# Patient Record
Sex: MALE | Race: WHITE | NOT HISPANIC OR LATINO | Employment: FULL TIME | URBAN - METROPOLITAN AREA
[De-identification: names, ages, dates, MRNs, and addresses within clinical notes are randomized per-mention and may not be internally consistent; named-entity substitution may affect disease eponyms.]

---

## 2017-06-30 ENCOUNTER — APPOINTMENT (OUTPATIENT)
Dept: GENERAL RADIOLOGY | Facility: HOSPITAL | Age: 72
End: 2017-06-30

## 2017-06-30 ENCOUNTER — HOSPITAL ENCOUNTER (INPATIENT)
Facility: HOSPITAL | Age: 72
LOS: 14 days | Discharge: HOME HEALTH | End: 2017-07-14
Attending: EMERGENCY MEDICINE | Admitting: INTERNAL MEDICINE

## 2017-06-30 DIAGNOSIS — Z74.09 IMPAIRED MOBILITY: ICD-10-CM

## 2017-06-30 DIAGNOSIS — S72.001A CLOSED RIGHT HIP FRACTURE, INITIAL ENCOUNTER (HCC): ICD-10-CM
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
## 2017-06-30 PROBLEM — I10 HTN (HYPERTENSION): Status: ACTIVE | Noted: 2017-06-30

## 2017-06-30 PROBLEM — I49.3 PVC (PREMATURE VENTRICULAR CONTRACTION): Status: ACTIVE | Noted: 2017-06-30

## 2017-06-30 LAB
ABO GROUP BLD: NORMAL
ALBUMIN SERPL-MCNC: 4.4 G/DL (ref 3.5–5.2)
ALBUMIN/GLOB SERPL: 1.4 G/DL
ALP SERPL-CCNC: 91 U/L (ref 39–117)
ALT SERPL W P-5'-P-CCNC: 26 U/L (ref 1–41)
ANION GAP SERPL CALCULATED.3IONS-SCNC: 10.3 MMOL/L
AST SERPL-CCNC: 17 U/L (ref 1–40)
BASOPHILS # BLD AUTO: 0.08 10*3/MM3 (ref 0–0.2)
BASOPHILS NFR BLD AUTO: 0.7 % (ref 0–1.5)
BILIRUB SERPL-MCNC: 0.6 MG/DL (ref 0.1–1.2)
BLD GP AB SCN SERPL QL: NEGATIVE
BUN BLD-MCNC: 12 MG/DL (ref 8–23)
BUN/CREAT SERPL: 11.5 (ref 7–25)
CALCIUM SPEC-SCNC: 9.3 MG/DL (ref 8.6–10.5)
CHLORIDE SERPL-SCNC: 101 MMOL/L (ref 98–107)
CO2 SERPL-SCNC: 27.7 MMOL/L (ref 22–29)
CREAT BLD-MCNC: 1.04 MG/DL (ref 0.76–1.27)
DEPRECATED RDW RBC AUTO: 45 FL (ref 37–54)
EOSINOPHIL # BLD AUTO: 0.23 10*3/MM3 (ref 0–0.7)
EOSINOPHIL NFR BLD AUTO: 2 % (ref 0.3–6.2)
ERYTHROCYTE [DISTWIDTH] IN BLOOD BY AUTOMATED COUNT: 13.2 % (ref 11.5–14.5)
GFR SERPL CREATININE-BSD FRML MDRD: 71 ML/MIN/1.73
GLOBULIN UR ELPH-MCNC: 3.1 GM/DL
GLUCOSE BLD-MCNC: 109 MG/DL (ref 65–99)
HCT VFR BLD AUTO: 47.4 % (ref 40.4–52.2)
HGB BLD-MCNC: 16.1 G/DL (ref 13.7–17.6)
IMM GRANULOCYTES # BLD: 0.04 10*3/MM3 (ref 0–0.03)
IMM GRANULOCYTES NFR BLD: 0.3 % (ref 0–0.5)
LYMPHOCYTES # BLD AUTO: 2.08 10*3/MM3 (ref 0.9–4.8)
LYMPHOCYTES NFR BLD AUTO: 17.7 % (ref 19.6–45.3)
MCH RBC QN AUTO: 31.9 PG (ref 27–32.7)
MCHC RBC AUTO-ENTMCNC: 34 G/DL (ref 32.6–36.4)
MCV RBC AUTO: 94 FL (ref 79.8–96.2)
MONOCYTES # BLD AUTO: 0.93 10*3/MM3 (ref 0.2–1.2)
MONOCYTES NFR BLD AUTO: 7.9 % (ref 5–12)
NEUTROPHILS # BLD AUTO: 8.41 10*3/MM3 (ref 1.9–8.1)
NEUTROPHILS NFR BLD AUTO: 71.4 % (ref 42.7–76)
PLATELET # BLD AUTO: 150 10*3/MM3 (ref 140–500)
PMV BLD AUTO: 13.9 FL (ref 6–12)
POTASSIUM BLD-SCNC: 4.3 MMOL/L (ref 3.5–5.2)
PROT SERPL-MCNC: 7.5 G/DL (ref 6–8.5)
RBC # BLD AUTO: 5.04 10*6/MM3 (ref 4.6–6)
RH BLD: POSITIVE
SODIUM BLD-SCNC: 139 MMOL/L (ref 136–145)
WBC NRBC COR # BLD: 11.77 10*3/MM3 (ref 4.5–10.7)

## 2017-06-30 PROCEDURE — 86900 BLOOD TYPING SEROLOGIC ABO: CPT | Performed by: EMERGENCY MEDICINE

## 2017-06-30 PROCEDURE — 71010 HC CHEST PA OR AP: CPT

## 2017-06-30 PROCEDURE — 25010000002 MORPHINE PER 10 MG: Performed by: EMERGENCY MEDICINE

## 2017-06-30 PROCEDURE — 93010 ELECTROCARDIOGRAM REPORT: CPT | Performed by: INTERNAL MEDICINE

## 2017-06-30 PROCEDURE — 93005 ELECTROCARDIOGRAM TRACING: CPT | Performed by: EMERGENCY MEDICINE

## 2017-06-30 PROCEDURE — 99284 EMERGENCY DEPT VISIT MOD MDM: CPT

## 2017-06-30 PROCEDURE — 73502 X-RAY EXAM HIP UNI 2-3 VIEWS: CPT

## 2017-06-30 PROCEDURE — 86901 BLOOD TYPING SEROLOGIC RH(D): CPT | Performed by: EMERGENCY MEDICINE

## 2017-06-30 PROCEDURE — 25010000002 MORPHINE PER 10 MG: Performed by: PHYSICIAN ASSISTANT

## 2017-06-30 PROCEDURE — 25010000002 MORPHINE PER 10 MG: Performed by: ORTHOPAEDIC SURGERY

## 2017-06-30 PROCEDURE — 80053 COMPREHEN METABOLIC PANEL: CPT | Performed by: EMERGENCY MEDICINE

## 2017-06-30 PROCEDURE — 85025 COMPLETE CBC W/AUTO DIFF WBC: CPT | Performed by: EMERGENCY MEDICINE

## 2017-06-30 PROCEDURE — 86850 RBC ANTIBODY SCREEN: CPT | Performed by: EMERGENCY MEDICINE

## 2017-06-30 PROCEDURE — 25010000002 ONDANSETRON PER 1 MG: Performed by: EMERGENCY MEDICINE

## 2017-06-30 RX ORDER — AMLODIPINE BESYLATE 10 MG/1
10 TABLET ORAL DAILY
Status: DISCONTINUED | OUTPATIENT
Start: 2017-07-01 | End: 2017-07-09

## 2017-06-30 RX ORDER — ATORVASTATIN CALCIUM 10 MG/1
10 TABLET, FILM COATED ORAL DAILY
COMMUNITY
End: 2017-07-20 | Stop reason: HOSPADM

## 2017-06-30 RX ORDER — ATORVASTATIN CALCIUM 10 MG/1
10 TABLET, FILM COATED ORAL DAILY
Status: DISCONTINUED | OUTPATIENT
Start: 2017-07-01 | End: 2017-07-14 | Stop reason: HOSPADM

## 2017-06-30 RX ORDER — CARVEDILOL 12.5 MG/1
12.5 TABLET ORAL DAILY
Status: DISCONTINUED | OUTPATIENT
Start: 2017-07-01 | End: 2017-07-14 | Stop reason: HOSPADM

## 2017-06-30 RX ORDER — SODIUM CHLORIDE 0.9 % (FLUSH) 0.9 %
10 SYRINGE (ML) INJECTION AS NEEDED
Status: DISCONTINUED | OUTPATIENT
Start: 2017-06-30 | End: 2017-07-01

## 2017-06-30 RX ORDER — ONDANSETRON 2 MG/ML
4 INJECTION INTRAMUSCULAR; INTRAVENOUS ONCE
Status: COMPLETED | OUTPATIENT
Start: 2017-06-30 | End: 2017-06-30

## 2017-06-30 RX ORDER — AMLODIPINE BESYLATE 10 MG/1
10 TABLET ORAL DAILY
COMMUNITY
End: 2017-07-14 | Stop reason: HOSPADM

## 2017-06-30 RX ORDER — SODIUM CHLORIDE 0.9 % (FLUSH) 0.9 %
1-10 SYRINGE (ML) INJECTION AS NEEDED
Status: DISCONTINUED | OUTPATIENT
Start: 2017-06-30 | End: 2017-07-01

## 2017-06-30 RX ORDER — CARVEDILOL 12.5 MG/1
12.5 TABLET ORAL DAILY
COMMUNITY

## 2017-06-30 RX ORDER — LOSARTAN POTASSIUM 50 MG/1
100 TABLET ORAL DAILY
Status: DISCONTINUED | OUTPATIENT
Start: 2017-07-01 | End: 2017-07-09

## 2017-06-30 RX ORDER — SULINDAC 200 MG/1
200 TABLET ORAL 2 TIMES DAILY
COMMUNITY
End: 2017-07-20 | Stop reason: HOSPADM

## 2017-06-30 RX ORDER — ACETAMINOPHEN 325 MG/1
650 TABLET ORAL EVERY 4 HOURS PRN
Status: DISCONTINUED | OUTPATIENT
Start: 2017-06-30 | End: 2017-07-01

## 2017-06-30 RX ORDER — ASPIRIN 81 MG/1
81 TABLET, CHEWABLE ORAL DAILY
COMMUNITY

## 2017-06-30 RX ORDER — LOSARTAN POTASSIUM 50 MG/1
100 TABLET ORAL DAILY
COMMUNITY
End: 2017-07-14 | Stop reason: HOSPADM

## 2017-06-30 RX ADMIN — ONDANSETRON 4 MG: 2 INJECTION INTRAMUSCULAR; INTRAVENOUS at 17:41

## 2017-06-30 RX ADMIN — MORPHINE SULFATE 4 MG: 4 INJECTION, SOLUTION INTRAMUSCULAR; INTRAVENOUS at 19:28

## 2017-06-30 RX ADMIN — MORPHINE SULFATE 4 MG: 4 INJECTION, SOLUTION INTRAMUSCULAR; INTRAVENOUS at 17:44

## 2017-06-30 RX ADMIN — MORPHINE SULFATE 4 MG: 4 INJECTION, SOLUTION INTRAMUSCULAR; INTRAVENOUS at 23:04

## 2017-07-01 ENCOUNTER — APPOINTMENT (OUTPATIENT)
Dept: GENERAL RADIOLOGY | Facility: HOSPITAL | Age: 72
End: 2017-07-01

## 2017-07-01 ENCOUNTER — ANESTHESIA EVENT (OUTPATIENT)
Dept: PERIOP | Facility: HOSPITAL | Age: 72
End: 2017-07-01

## 2017-07-01 ENCOUNTER — ANESTHESIA (OUTPATIENT)
Dept: PERIOP | Facility: HOSPITAL | Age: 72
End: 2017-07-01

## 2017-07-01 LAB
ALBUMIN SERPL-MCNC: 4 G/DL (ref 3.5–5.2)
ALBUMIN/GLOB SERPL: 1.4 G/DL
ALP SERPL-CCNC: 81 U/L (ref 39–117)
ALT SERPL W P-5'-P-CCNC: 29 U/L (ref 1–41)
ANION GAP SERPL CALCULATED.3IONS-SCNC: 15 MMOL/L
AST SERPL-CCNC: 23 U/L (ref 1–40)
BASOPHILS # BLD AUTO: 0.07 10*3/MM3 (ref 0–0.2)
BASOPHILS NFR BLD AUTO: 0.6 % (ref 0–1.5)
BILIRUB SERPL-MCNC: 1 MG/DL (ref 0.1–1.2)
BUN BLD-MCNC: 11 MG/DL (ref 8–23)
BUN/CREAT SERPL: 11.5 (ref 7–25)
CALCIUM SPEC-SCNC: 8.9 MG/DL (ref 8.6–10.5)
CHLORIDE SERPL-SCNC: 102 MMOL/L (ref 98–107)
CO2 SERPL-SCNC: 22 MMOL/L (ref 22–29)
CREAT BLD-MCNC: 0.96 MG/DL (ref 0.76–1.27)
DEPRECATED RDW RBC AUTO: 46.1 FL (ref 37–54)
EOSINOPHIL # BLD AUTO: 0.24 10*3/MM3 (ref 0–0.7)
EOSINOPHIL NFR BLD AUTO: 2.1 % (ref 0.3–6.2)
ERYTHROCYTE [DISTWIDTH] IN BLOOD BY AUTOMATED COUNT: 13.3 % (ref 11.5–14.5)
GFR SERPL CREATININE-BSD FRML MDRD: 77 ML/MIN/1.73
GLOBULIN UR ELPH-MCNC: 2.8 GM/DL
GLUCOSE BLD-MCNC: 106 MG/DL (ref 65–99)
GLUCOSE BLDC GLUCOMTR-MCNC: 99 MG/DL (ref 70–130)
HCT VFR BLD AUTO: 45.8 % (ref 40.4–52.2)
HGB BLD-MCNC: 15.5 G/DL (ref 13.7–17.6)
IMM GRANULOCYTES # BLD: 0.03 10*3/MM3 (ref 0–0.03)
IMM GRANULOCYTES NFR BLD: 0.3 % (ref 0–0.5)
LYMPHOCYTES # BLD AUTO: 2.1 10*3/MM3 (ref 0.9–4.8)
LYMPHOCYTES NFR BLD AUTO: 18.8 % (ref 19.6–45.3)
MAGNESIUM SERPL-MCNC: 2.3 MG/DL (ref 1.6–2.4)
MCH RBC QN AUTO: 32 PG (ref 27–32.7)
MCHC RBC AUTO-ENTMCNC: 33.8 G/DL (ref 32.6–36.4)
MCV RBC AUTO: 94.6 FL (ref 79.8–96.2)
MONOCYTES # BLD AUTO: 1.38 10*3/MM3 (ref 0.2–1.2)
MONOCYTES NFR BLD AUTO: 12.4 % (ref 5–12)
NEUTROPHILS # BLD AUTO: 7.35 10*3/MM3 (ref 1.9–8.1)
NEUTROPHILS NFR BLD AUTO: 65.8 % (ref 42.7–76)
PLATELET # BLD AUTO: 134 10*3/MM3 (ref 140–500)
PMV BLD AUTO: 13.9 FL (ref 6–12)
POTASSIUM BLD-SCNC: 4.1 MMOL/L (ref 3.5–5.2)
PROT SERPL-MCNC: 6.8 G/DL (ref 6–8.5)
RBC # BLD AUTO: 4.84 10*6/MM3 (ref 4.6–6)
SODIUM BLD-SCNC: 139 MMOL/L (ref 136–145)
TSH SERPL DL<=0.05 MIU/L-ACNC: 1.95 MIU/ML (ref 0.27–4.2)
WBC NRBC COR # BLD: 11.17 10*3/MM3 (ref 4.5–10.7)

## 2017-07-01 PROCEDURE — 25010000002 MORPHINE PER 10 MG: Performed by: ORTHOPAEDIC SURGERY

## 2017-07-01 PROCEDURE — 73502 X-RAY EXAM HIP UNI 2-3 VIEWS: CPT

## 2017-07-01 PROCEDURE — 80053 COMPREHEN METABOLIC PANEL: CPT | Performed by: INTERNAL MEDICINE

## 2017-07-01 PROCEDURE — 82962 GLUCOSE BLOOD TEST: CPT

## 2017-07-01 PROCEDURE — C1713 ANCHOR/SCREW BN/BN,TIS/BN: HCPCS | Performed by: ORTHOPAEDIC SURGERY

## 2017-07-01 PROCEDURE — 85025 COMPLETE CBC W/AUTO DIFF WBC: CPT | Performed by: INTERNAL MEDICINE

## 2017-07-01 PROCEDURE — 25010000003 CEFAZOLIN IN DEXTROSE 2-4 GM/100ML-% SOLUTION: Performed by: ORTHOPAEDIC SURGERY

## 2017-07-01 PROCEDURE — 84443 ASSAY THYROID STIM HORMONE: CPT | Performed by: INTERNAL MEDICINE

## 2017-07-01 PROCEDURE — 25010000002 PROPOFOL 10 MG/ML EMULSION: Performed by: NURSE ANESTHETIST, CERTIFIED REGISTERED

## 2017-07-01 PROCEDURE — 73501 X-RAY EXAM HIP UNI 1 VIEW: CPT

## 2017-07-01 PROCEDURE — 0QS636Z REPOSITION RIGHT UPPER FEMUR WITH INTRAMEDULLARY INTERNAL FIXATION DEVICE, PERCUTANEOUS APPROACH: ICD-10-PCS | Performed by: ORTHOPAEDIC SURGERY

## 2017-07-01 PROCEDURE — 25010000002 FENTANYL CITRATE (PF) 100 MCG/2ML SOLUTION: Performed by: NURSE ANESTHETIST, CERTIFIED REGISTERED

## 2017-07-01 PROCEDURE — 76000 FLUOROSCOPY <1 HR PHYS/QHP: CPT

## 2017-07-01 PROCEDURE — 97161 PT EVAL LOW COMPLEX 20 MIN: CPT | Performed by: PHYSICAL THERAPIST

## 2017-07-01 PROCEDURE — 83735 ASSAY OF MAGNESIUM: CPT | Performed by: INTERNAL MEDICINE

## 2017-07-01 PROCEDURE — 25010000002 SUCCINYLCHOLINE PER 20 MG: Performed by: NURSE ANESTHETIST, CERTIFIED REGISTERED

## 2017-07-01 PROCEDURE — 25010000002 ONDANSETRON PER 1 MG: Performed by: NURSE ANESTHETIST, CERTIFIED REGISTERED

## 2017-07-01 PROCEDURE — 25010000002 MIDAZOLAM PER 1 MG: Performed by: ANESTHESIOLOGY

## 2017-07-01 DEVICE — SCRW CORT FA FUL/THRD HEX3.5 TI 5X35MM: Type: IMPLANTABLE DEVICE | Site: FEMUR | Status: FUNCTIONAL

## 2017-07-01 DEVICE — ZNN CMN NAIL 11.5MMX21.5CM 125R
Type: IMPLANTABLE DEVICE | Site: FEMUR | Status: FUNCTIONAL
Brand: ZIMMER® NATURAL NAIL® SYSTEM

## 2017-07-01 DEVICE — ZNN CMN LAG SCREW 10.5X110
Type: IMPLANTABLE DEVICE | Site: FEMUR | Status: FUNCTIONAL
Brand: ZIMMER® NATURAL NAIL® SYSTEM

## 2017-07-01 RX ORDER — HYDROMORPHONE HYDROCHLORIDE 1 MG/ML
0.5 INJECTION, SOLUTION INTRAMUSCULAR; INTRAVENOUS; SUBCUTANEOUS
Status: DISCONTINUED | OUTPATIENT
Start: 2017-07-01 | End: 2017-07-01 | Stop reason: HOSPADM

## 2017-07-01 RX ORDER — PROPOFOL 10 MG/ML
VIAL (ML) INTRAVENOUS AS NEEDED
Status: DISCONTINUED | OUTPATIENT
Start: 2017-07-01 | End: 2017-07-01 | Stop reason: SURG

## 2017-07-01 RX ORDER — ONDANSETRON 4 MG/1
4 TABLET, FILM COATED ORAL EVERY 6 HOURS PRN
Status: DISCONTINUED | OUTPATIENT
Start: 2017-07-01 | End: 2017-07-14 | Stop reason: HOSPADM

## 2017-07-01 RX ORDER — ROCURONIUM BROMIDE 10 MG/ML
INJECTION, SOLUTION INTRAVENOUS AS NEEDED
Status: DISCONTINUED | OUTPATIENT
Start: 2017-07-01 | End: 2017-07-01 | Stop reason: SURG

## 2017-07-01 RX ORDER — FENTANYL CITRATE 50 UG/ML
INJECTION, SOLUTION INTRAMUSCULAR; INTRAVENOUS AS NEEDED
Status: DISCONTINUED | OUTPATIENT
Start: 2017-07-01 | End: 2017-07-01 | Stop reason: SURG

## 2017-07-01 RX ORDER — KETOROLAC TROMETHAMINE 30 MG/ML
15 INJECTION, SOLUTION INTRAMUSCULAR; INTRAVENOUS EVERY 6 HOURS PRN
Status: ACTIVE | OUTPATIENT
Start: 2017-07-01 | End: 2017-07-03

## 2017-07-01 RX ORDER — LIDOCAINE HYDROCHLORIDE 20 MG/ML
INJECTION, SOLUTION INFILTRATION; PERINEURAL AS NEEDED
Status: DISCONTINUED | OUTPATIENT
Start: 2017-07-01 | End: 2017-07-01 | Stop reason: SURG

## 2017-07-01 RX ORDER — PROMETHAZINE HYDROCHLORIDE 25 MG/ML
6.25 INJECTION, SOLUTION INTRAMUSCULAR; INTRAVENOUS ONCE AS NEEDED
Status: DISCONTINUED | OUTPATIENT
Start: 2017-07-01 | End: 2017-07-01 | Stop reason: HOSPADM

## 2017-07-01 RX ORDER — CEFAZOLIN SODIUM 2 G/100ML
2 INJECTION, SOLUTION INTRAVENOUS EVERY 8 HOURS
Status: DISCONTINUED | OUTPATIENT
Start: 2017-07-01 | End: 2017-07-01 | Stop reason: SDUPTHER

## 2017-07-01 RX ORDER — SUCCINYLCHOLINE CHLORIDE 20 MG/ML
INJECTION INTRAMUSCULAR; INTRAVENOUS AS NEEDED
Status: DISCONTINUED | OUTPATIENT
Start: 2017-07-01 | End: 2017-07-01 | Stop reason: SURG

## 2017-07-01 RX ORDER — IPRATROPIUM BROMIDE AND ALBUTEROL SULFATE 2.5; .5 MG/3ML; MG/3ML
3 SOLUTION RESPIRATORY (INHALATION) ONCE AS NEEDED
Status: DISCONTINUED | OUTPATIENT
Start: 2017-07-01 | End: 2017-07-01 | Stop reason: HOSPADM

## 2017-07-01 RX ORDER — LABETALOL HYDROCHLORIDE 5 MG/ML
5 INJECTION, SOLUTION INTRAVENOUS
Status: DISCONTINUED | OUTPATIENT
Start: 2017-07-01 | End: 2017-07-01 | Stop reason: HOSPADM

## 2017-07-01 RX ORDER — PROMETHAZINE HYDROCHLORIDE 25 MG/1
25 TABLET ORAL ONCE AS NEEDED
Status: DISCONTINUED | OUTPATIENT
Start: 2017-07-01 | End: 2017-07-01 | Stop reason: HOSPADM

## 2017-07-01 RX ORDER — FENTANYL CITRATE 50 UG/ML
INJECTION, SOLUTION INTRAMUSCULAR; INTRAVENOUS
Status: DISPENSED
Start: 2017-07-01 | End: 2017-07-01

## 2017-07-01 RX ORDER — EPHEDRINE SULFATE 50 MG/ML
INJECTION, SOLUTION INTRAVENOUS AS NEEDED
Status: DISCONTINUED | OUTPATIENT
Start: 2017-07-01 | End: 2017-07-01 | Stop reason: SURG

## 2017-07-01 RX ORDER — MIDAZOLAM HYDROCHLORIDE 1 MG/ML
2 INJECTION INTRAMUSCULAR; INTRAVENOUS
Status: DISCONTINUED | OUTPATIENT
Start: 2017-07-01 | End: 2017-07-01 | Stop reason: HOSPADM

## 2017-07-01 RX ORDER — ONDANSETRON 2 MG/ML
4 INJECTION INTRAMUSCULAR; INTRAVENOUS EVERY 6 HOURS PRN
Status: DISCONTINUED | OUTPATIENT
Start: 2017-07-01 | End: 2017-07-14 | Stop reason: HOSPADM

## 2017-07-01 RX ORDER — BISACODYL 10 MG
10 SUPPOSITORY, RECTAL RECTAL DAILY PRN
Status: DISCONTINUED | OUTPATIENT
Start: 2017-07-01 | End: 2017-07-07

## 2017-07-01 RX ORDER — HYDRALAZINE HYDROCHLORIDE 20 MG/ML
5 INJECTION INTRAMUSCULAR; INTRAVENOUS
Status: DISCONTINUED | OUTPATIENT
Start: 2017-07-01 | End: 2017-07-01 | Stop reason: HOSPADM

## 2017-07-01 RX ORDER — PROMETHAZINE HYDROCHLORIDE 25 MG/1
25 SUPPOSITORY RECTAL ONCE AS NEEDED
Status: DISCONTINUED | OUTPATIENT
Start: 2017-07-01 | End: 2017-07-01 | Stop reason: HOSPADM

## 2017-07-01 RX ORDER — ACETAMINOPHEN 325 MG/1
650 TABLET ORAL EVERY 4 HOURS PRN
Status: DISCONTINUED | OUTPATIENT
Start: 2017-07-01 | End: 2017-07-14 | Stop reason: HOSPADM

## 2017-07-01 RX ORDER — NALOXONE HCL 0.4 MG/ML
0.1 VIAL (ML) INJECTION
Status: DISCONTINUED | OUTPATIENT
Start: 2017-07-01 | End: 2017-07-03

## 2017-07-01 RX ORDER — DIPHENHYDRAMINE HCL 25 MG
25 CAPSULE ORAL EVERY 6 HOURS PRN
Status: DISCONTINUED | OUTPATIENT
Start: 2017-07-01 | End: 2017-07-14 | Stop reason: HOSPADM

## 2017-07-01 RX ORDER — HYDROCODONE BITARTRATE AND ACETAMINOPHEN 10; 325 MG/1; MG/1
1 TABLET ORAL EVERY 4 HOURS PRN
Status: DISCONTINUED | OUTPATIENT
Start: 2017-07-01 | End: 2017-07-03

## 2017-07-01 RX ORDER — CEFAZOLIN SODIUM 2 G/100ML
2 INJECTION, SOLUTION INTRAVENOUS EVERY 8 HOURS
Status: COMPLETED | OUTPATIENT
Start: 2017-07-01 | End: 2017-07-02

## 2017-07-01 RX ORDER — FENTANYL CITRATE 50 UG/ML
50 INJECTION, SOLUTION INTRAMUSCULAR; INTRAVENOUS
Status: DISCONTINUED | OUTPATIENT
Start: 2017-07-01 | End: 2017-07-01 | Stop reason: HOSPADM

## 2017-07-01 RX ORDER — SODIUM CHLORIDE, SODIUM LACTATE, POTASSIUM CHLORIDE, CALCIUM CHLORIDE 600; 310; 30; 20 MG/100ML; MG/100ML; MG/100ML; MG/100ML
9 INJECTION, SOLUTION INTRAVENOUS CONTINUOUS
Status: DISCONTINUED | OUTPATIENT
Start: 2017-07-01 | End: 2017-07-01

## 2017-07-01 RX ORDER — SODIUM CHLORIDE 0.9 % (FLUSH) 0.9 %
1-10 SYRINGE (ML) INJECTION AS NEEDED
Status: DISCONTINUED | OUTPATIENT
Start: 2017-07-01 | End: 2017-07-14 | Stop reason: HOSPADM

## 2017-07-01 RX ORDER — ONDANSETRON 4 MG/1
4 TABLET, ORALLY DISINTEGRATING ORAL EVERY 6 HOURS PRN
Status: DISCONTINUED | OUTPATIENT
Start: 2017-07-01 | End: 2017-07-14 | Stop reason: HOSPADM

## 2017-07-01 RX ORDER — UREA 10 %
1 LOTION (ML) TOPICAL NIGHTLY PRN
Status: DISCONTINUED | OUTPATIENT
Start: 2017-07-01 | End: 2017-07-14 | Stop reason: HOSPADM

## 2017-07-01 RX ORDER — FAMOTIDINE 10 MG/ML
20 INJECTION, SOLUTION INTRAVENOUS ONCE
Status: COMPLETED | OUTPATIENT
Start: 2017-07-01 | End: 2017-07-01

## 2017-07-01 RX ORDER — OXYCODONE AND ACETAMINOPHEN 10; 325 MG/1; MG/1
2 TABLET ORAL EVERY 4 HOURS PRN
Status: DISCONTINUED | OUTPATIENT
Start: 2017-07-01 | End: 2017-07-03

## 2017-07-01 RX ORDER — SODIUM CHLORIDE 0.9 % (FLUSH) 0.9 %
1-10 SYRINGE (ML) INJECTION AS NEEDED
Status: DISCONTINUED | OUTPATIENT
Start: 2017-07-01 | End: 2017-07-01 | Stop reason: HOSPADM

## 2017-07-01 RX ORDER — ONDANSETRON 2 MG/ML
INJECTION INTRAMUSCULAR; INTRAVENOUS AS NEEDED
Status: DISCONTINUED | OUTPATIENT
Start: 2017-07-01 | End: 2017-07-01 | Stop reason: SURG

## 2017-07-01 RX ORDER — MAGNESIUM HYDROXIDE 1200 MG/15ML
LIQUID ORAL AS NEEDED
Status: DISCONTINUED | OUTPATIENT
Start: 2017-07-01 | End: 2017-07-01 | Stop reason: HOSPADM

## 2017-07-01 RX ORDER — HYDROMORPHONE HYDROCHLORIDE 1 MG/ML
0.5 INJECTION, SOLUTION INTRAMUSCULAR; INTRAVENOUS; SUBCUTANEOUS
Status: DISCONTINUED | OUTPATIENT
Start: 2017-07-01 | End: 2017-07-03

## 2017-07-01 RX ORDER — SODIUM CHLORIDE AND POTASSIUM CHLORIDE 150; 900 MG/100ML; MG/100ML
100 INJECTION, SOLUTION INTRAVENOUS CONTINUOUS
Status: DISCONTINUED | OUTPATIENT
Start: 2017-07-01 | End: 2017-07-01

## 2017-07-01 RX ORDER — LIDOCAINE HYDROCHLORIDE 40 MG/ML
SOLUTION TOPICAL AS NEEDED
Status: DISCONTINUED | OUTPATIENT
Start: 2017-07-01 | End: 2017-07-01 | Stop reason: SURG

## 2017-07-01 RX ORDER — CEFAZOLIN SODIUM 2 G/100ML
2 INJECTION, SOLUTION INTRAVENOUS ONCE
Status: COMPLETED | OUTPATIENT
Start: 2017-07-01 | End: 2017-07-01

## 2017-07-01 RX ORDER — MIDAZOLAM HYDROCHLORIDE 1 MG/ML
1 INJECTION INTRAMUSCULAR; INTRAVENOUS
Status: DISCONTINUED | OUTPATIENT
Start: 2017-07-01 | End: 2017-07-01 | Stop reason: HOSPADM

## 2017-07-01 RX ORDER — SENNA AND DOCUSATE SODIUM 50; 8.6 MG/1; MG/1
2 TABLET, FILM COATED ORAL 2 TIMES DAILY
Status: DISCONTINUED | OUTPATIENT
Start: 2017-07-01 | End: 2017-07-14 | Stop reason: HOSPADM

## 2017-07-01 RX ADMIN — LIDOCAINE HYDROCHLORIDE 60 MG: 20 INJECTION, SOLUTION INFILTRATION; PERINEURAL at 07:57

## 2017-07-01 RX ADMIN — FENTANYL CITRATE 50 MCG: 50 INJECTION, SOLUTION INTRAMUSCULAR; INTRAVENOUS at 09:29

## 2017-07-01 RX ADMIN — CEFAZOLIN SODIUM 2 G: 2 INJECTION, SOLUTION INTRAVENOUS at 15:39

## 2017-07-01 RX ADMIN — FENTANYL CITRATE 50 MCG: 50 INJECTION INTRAMUSCULAR; INTRAVENOUS at 08:40

## 2017-07-01 RX ADMIN — PROPOFOL 50 MG: 10 INJECTION, EMULSION INTRAVENOUS at 08:14

## 2017-07-01 RX ADMIN — SUCCINYLCHOLINE CHLORIDE 200 MG: 20 INJECTION, SOLUTION INTRAMUSCULAR; INTRAVENOUS; PARENTERAL at 07:57

## 2017-07-01 RX ADMIN — FENTANYL CITRATE 100 MCG: 50 INJECTION INTRAMUSCULAR; INTRAVENOUS at 08:14

## 2017-07-01 RX ADMIN — MORPHINE SULFATE 4 MG: 4 INJECTION, SOLUTION INTRAMUSCULAR; INTRAVENOUS at 03:29

## 2017-07-01 RX ADMIN — FENTANYL CITRATE 100 MCG: 50 INJECTION INTRAMUSCULAR; INTRAVENOUS at 07:57

## 2017-07-01 RX ADMIN — LOSARTAN POTASSIUM 100 MG: 50 TABLET, FILM COATED ORAL at 12:02

## 2017-07-01 RX ADMIN — CEFAZOLIN SODIUM 2 G: 2 INJECTION, SOLUTION INTRAVENOUS at 08:10

## 2017-07-01 RX ADMIN — OXYCODONE HYDROCHLORIDE AND ACETAMINOPHEN 2 TABLET: 10; 325 TABLET ORAL at 15:40

## 2017-07-01 RX ADMIN — AMLODIPINE BESYLATE 10 MG: 10 TABLET ORAL at 12:02

## 2017-07-01 RX ADMIN — ROCURONIUM BROMIDE 10 MG: 10 INJECTION INTRAVENOUS at 07:57

## 2017-07-01 RX ADMIN — FAMOTIDINE 20 MG: 10 INJECTION INTRAVENOUS at 07:32

## 2017-07-01 RX ADMIN — SODIUM CHLORIDE, POTASSIUM CHLORIDE, SODIUM LACTATE AND CALCIUM CHLORIDE 9 ML/HR: 600; 310; 30; 20 INJECTION, SOLUTION INTRAVENOUS at 07:33

## 2017-07-01 RX ADMIN — SENNA AND DOCUSATE SODIUM 2 TABLET: 50; 8.6 TABLET, FILM COATED ORAL at 17:47

## 2017-07-01 RX ADMIN — MORPHINE SULFATE 4 MG: 4 INJECTION, SOLUTION INTRAMUSCULAR; INTRAVENOUS at 05:49

## 2017-07-01 RX ADMIN — ROCURONIUM BROMIDE 40 MG: 10 INJECTION INTRAVENOUS at 08:00

## 2017-07-01 RX ADMIN — CARVEDILOL 12.5 MG: 12.5 TABLET, FILM COATED ORAL at 06:28

## 2017-07-01 RX ADMIN — OXYCODONE HYDROCHLORIDE AND ACETAMINOPHEN 2 TABLET: 10; 325 TABLET ORAL at 10:49

## 2017-07-01 RX ADMIN — OXYCODONE HYDROCHLORIDE AND ACETAMINOPHEN 2 TABLET: 10; 325 TABLET ORAL at 22:20

## 2017-07-01 RX ADMIN — PROPOFOL 150 MG: 10 INJECTION, EMULSION INTRAVENOUS at 07:57

## 2017-07-01 RX ADMIN — EPHEDRINE SULFATE 10 MG: 50 INJECTION INTRAMUSCULAR; INTRAVENOUS; SUBCUTANEOUS at 08:36

## 2017-07-01 RX ADMIN — MIDAZOLAM 0.5 MG: 1 INJECTION INTRAMUSCULAR; INTRAVENOUS at 07:32

## 2017-07-01 RX ADMIN — SUGAMMADEX 500 MG: 100 INJECTION, SOLUTION INTRAVENOUS at 08:34

## 2017-07-01 RX ADMIN — ONDANSETRON 4 MG: 2 INJECTION INTRAMUSCULAR; INTRAVENOUS at 08:30

## 2017-07-01 RX ADMIN — LIDOCAINE HYDROCHLORIDE 1 EACH: 40 SPRAY LARYNGEAL; TRANSTRACHEAL at 07:58

## 2017-07-01 NOTE — PROGRESS NOTES
" LOS: 1 day     Name: Demetris Nye  Age: 71 y.o.  Sex: male  :  1945  MRN: [id number]         Primary Care Physician: Rian Morfin MD    Subjective   Subjective  C/o postoperative right upper leg soreness.  Denies CP or SOA    Objective   Vital Signs  Temp:  [96.9 °F (36.1 °C)-98.2 °F (36.8 °C)] 98.2 °F (36.8 °C)  Heart Rate:  [57-83] 62  Resp:  [12-22] 20  BP: (125-191)/() 147/83  Body mass index is 35.67 kg/(m^2).    Objective:  General Appearance:  Comfortable and in no acute distress.    Vital signs: (most recent): Blood pressure 147/83, pulse 62, temperature 98.2 °F (36.8 °C), temperature source Oral, resp. rate 20, height 72\" (182.9 cm), weight 263 lb (119 kg), SpO2 93 %.    Lungs:  Normal respiratory rate and normal effort.    Heart: Normal rate.  Regular rhythm.    Abdomen: Abdomen is soft.  Bowel sounds are normal.   There is no abdominal tenderness.     Extremities: There is no local swelling or dependent edema.    Neurological: Patient is alert and oriented to person, place and time.    Skin:  Warm and dry.  (Right hip incisions dressed)            Results Review:       I reviewed the patient's new clinical results.      Results from last 7 days  Lab Units 17  0324 17  1735   WBC 10*3/mm3 11.17* 11.77*   HEMOGLOBIN g/dL 15.5 16.1   PLATELETS 10*3/mm3 134* 150       Results from last 7 days  Lab Units 17  0324 17  1735   SODIUM mmol/L 139 139   POTASSIUM mmol/L 4.1 4.3   CHLORIDE mmol/L 102 101   CO2 mmol/L 22.0 27.7   BUN mg/dL 11 12   CREATININE mg/dL 0.96 1.04   CALCIUM mg/dL 8.9 9.3   GLUCOSE mg/dL 106* 109*                 Scheduled Meds:     amLODIPine 10 mg Oral Daily   atorvastatin 10 mg Oral Daily   carvedilol 12.5 mg Oral Daily   ceFAZolin 2 g Intravenous Q8H   [START ON 2017] enoxaparin 40 mg Subcutaneous Daily   fentaNYL citrate (PF)      losartan 100 mg Oral Daily   sennosides-docusate sodium 2 tablet Oral BID     PRN Meds:   •  " acetaminophen  •  bisacodyl  •  diphenhydrAMINE  •  HYDROcodone-acetaminophen  •  HYDROmorphone **AND** naloxone  •  ketorolac  •  melatonin  •  ondansetron **OR** ondansetron ODT **OR** ondansetron  •  oxyCODONE-acetaminophen  •  pneumococcal polysaccharide 23-valent  •  sodium chloride  Continuous Infusions:    sodium chloride 0.9 % with KCl 20 mEq 100 mL/hr       Assessment/Plan   Principal Problem:    Closed right hip fracture  Active Problems:    Fall    HTN (hypertension)    PVC (premature ventricular contraction)      Assessment & Plan    - S/p right hip nailing.  Appears medically stable at this time.  No complaints of SOA or CP  - Continue current medications.  - Lovenox has been added for DVT proph  - PT to see  - Use IS and wean o2 as tolerated    Demetris Mccoy MD  [facility]  07/01/17  12:19 PM

## 2017-07-01 NOTE — PLAN OF CARE
Problem: Patient Care Overview (Adult)  Goal: Plan of Care Review  Outcome: Ongoing (interventions implemented as appropriate)    07/01/17 0441   Coping/Psychosocial Response Interventions   Plan Of Care Reviewed With patient   Patient Care Overview   Progress no change   Outcome Evaluation   Outcome Summary/Follow up Plan patient from ER scheduled for surgery this AM, morphine given with good relief, blood pressure elevated in ER and on admission, much better now with the pain relief, NPO, consent not signed yet Dr Alba will be in the speak with him this morning       Goal: Adult Individualization and Mutuality  Outcome: Ongoing (interventions implemented as appropriate)  Goal: Discharge Needs Assessment  Outcome: Ongoing (interventions implemented as appropriate)    Problem: Orthopaedic Fracture (Adult)  Goal: Signs and Symptoms of Listed Potential Problems Will be Absent or Manageable (Orthopaedic Fracture)  Outcome: Ongoing (interventions implemented as appropriate)    Problem: Fall Risk (Adult)  Goal: Identify Related Risk Factors and Signs and Symptoms  Outcome: Outcome(s) achieved Date Met:  07/01/17  Goal: Absence of Falls  Outcome: Ongoing (interventions implemented as appropriate)

## 2017-07-01 NOTE — PLAN OF CARE
Problem: Perioperative Period (Adult)  Goal: Signs and Symptoms of Listed Potential Problems Will be Absent or Manageable (Perioperative Period)  Outcome: Ongoing (interventions implemented as appropriate)    07/01/17 1038   Perioperative Period   Problems Assessed (Perioperative Period) all   Problems Present (Perioperative Period) pain;hypoxia/hypoxemia  (pain controlled to pt tolerance, pulm toilet promoted )

## 2017-07-01 NOTE — H&P
Internal medicine history and physical    INTERNAL MEDICINE   [facility]       Patient Identification:  Name: Demetris Nye  Age: 71 y.o.  Sex: male  :  1945  MRN: [id number]                   Primary Care Physician: Rian Morfin MD                                     Chief Complaint: Pain and discomfort in the right hip since the fall this afternoon.    History of Present Illness:   Patient is 71-year-old male with past medical history is remarkable for blood pressure dyslipidemia was in his usual state of his health until this afternoon when he fell at that appeared place the work.  He instantly have developed significant pain and discomfort in the right hip and could not get up.  He was brought to the emergency room and was found to have right hip intertrochanteric fracture and is being admitted for further care. Prior to fall he was feeling fine and had no other symptoms.    Past Medical History:  Past Medical History:   Diagnosis Date   • Arthritis    • Hyperlipidemia    • Hypertension    • Irregular cardiac rhythm      Past Surgical History:  History reviewed. No pertinent surgical history.   Home Meds:  Prescriptions Prior to Admission   Medication Sig Dispense Refill Last Dose   • amLODIPine (NORVASC) 10 MG tablet Take 10 mg by mouth Daily.   2017 at 2100   • aspirin 81 MG chewable tablet Chew 81 mg Daily.   2017 at 0900   • atorvastatin (LIPITOR) 10 MG tablet Take 10 mg by mouth Daily.   2017 at 2100   • carvedilol (COREG) 12.5 MG tablet Take 12.5 mg by mouth Daily.   2017 at 2100   • losartan (COZAAR) 50 MG tablet Take 100 mg by mouth Daily.   2017 at 2100   • sulindac (CLINORIL) 200 MG tablet Take 200 mg by mouth 2 (Two) Times a Day.   2017 at 0900     Current Meds:     Current Facility-Administered Medications:   •  morphine injection 4 mg, 4 mg, Intravenous, Q2H PRN, Manuelito Alba MD  •  Insert peripheral IV, , , Once **AND** sodium  "chloride 0.9 % flush 10 mL, 10 mL, Intravenous, PRN, Seven Carbajal MD  Allergies:  No Known Allergies  Social History:   Social History   Substance Use Topics   • Smoking status: Current Every Day Smoker   • Smokeless tobacco: Not on file   • Alcohol use No      Family History:  History reviewed. No pertinent family history.       Review of Systems  See history of present illness and past medical history.   Constitutional: Negative for chills and fever.   HENT: Negative for congestion and sore throat.   Respiratory: Negative for cough and shortness of breath.   Cardiovascular: Negative for chest pain and leg swelling.   Gastrointestinal: Negative for abdominal pain, diarrhea and vomiting.   Genitourinary: Negative for decreased urine volume and dysuria.   Musculoskeletal: Negative for neck pain.   R hip pain   Skin: Negative for pallor and rash.   Neurological: Negative for weakness, numbness and headaches.   All other systems reviewed and are negative.  Vitals:   /94 (BP Location: Left arm, Patient Position: Lying)  Pulse 83  Temp 96.9 °F (36.1 °C) (Oral)   Resp 18  Ht 72\" (182.9 cm)  Wt 263 lb (119 kg)  SpO2 94%  BMI 35.67 kg/m2  I/O: No intake or output data in the 24 hours ending 06/30/17 8741  Exam:  General Appearance:    Alert, cooperative, no distress, appears stated age   Head:    Normocephalic, without obvious abnormality, atraumatic   Eyes:    PERRL, conjunctiva/corneas clear, EOM's intact, both eyes   Ears:    Normal external ear canals, both ears   Nose:   Nares normal, septum midline, mucosa normal, no drainage    or sinus tenderness   Throat:   Lips, tongue, gums normal; oral mucosa pink and moist   Neck:   Supple, symmetrical, trachea midline, no adenopathy;     thyroid:  no enlargement/tenderness/nodules; no carotid    bruit or JVD   Back:     Symmetric, no curvature, ROM normal, no CVA tenderness   Lungs:     Clear to auscultation bilaterally, respirations unlabored   Chest Wall:  "   No tenderness or deformity    Heart:    Regular rate and rhythm, Occasional PVCs, S1 and S2 normal, no murmur, rub   or gallop   Abdomen:     Soft, non-tender, bowel sounds active all four quadrants,     no masses, no hepatomegaly, no splenomegaly   Extremities:   Right hip tenderness and decrease ROM, swelling of the thigh and leg is short and externally rotated   Pulses:   Pulses palpable in all extremities; symmetric all extremities   Skin:   Skin color normal, Skin is warm and dry,  no rashes or palpable lesions   Neurologic:   CNII-XII intact, motor strength grossly intact, sensation grossly intact to light touch, no focal deficits noted       Data Review:  XR shows right hip fracture    I reviewed the patient's new clinical results.    Results from last 7 days  Lab Units 06/30/17  1735   WBC 10*3/mm3 11.77*   HEMOGLOBIN g/dL 16.1   PLATELETS 10*3/mm3 150       Results from last 7 days  Lab Units 06/30/17  1735   SODIUM mmol/L 139   POTASSIUM mmol/L 4.3   CHLORIDE mmol/L 101   CO2 mmol/L 27.7   BUN mg/dL 12   CREATININE mg/dL 1.04   CALCIUM mg/dL 9.3   GLUCOSE mg/dL 109*   Chest x-ray did not show any acute abnormality  X-ray hip study shows acute intertrochanteric fracture of the right proximal femur  EKG time: 1712  Rhythm/Rate: Ventricular Bygeminy at 81 bpm  P waves and OH: Normal  QRS, axis: RBBB  ST and T waves: L anterior vesicular block   Assessment:  Active Problems:    Fall    Closed right hip fracture    HTN (hypertension)    PVC (premature ventricular contraction)      Plan:  Continue his home medications.  Orthopedic surgery consultation.  Pain control and DVT prophylaxis.  Patient does not have any active symptoms of cardiac decompensation and he seems to be an appropriate medication for his ventricular bigeminy it is prudent to consider operative intervention for right hip fracture.he is at acceptable cardiovascular risk for hip surgery and is ok to proceed from IM stand point with continuation  of his medication.      Jaime Willoughby MD   6/30/2017  10:18 PM  Much of this encounter note is an electronic transcription/translation of spoken language to printed text. The electronic translation of spoken language may permit erroneous, or at times, nonsensical words or phrases to be inadvertently transcribed; Although I have reviewed the note for such errors, some may still exist

## 2017-07-01 NOTE — CONSULTS
ORTHOPEDIC SURGERY CONSULT      Patient: Demetris Nye  Date of Admission: 6/30/2017  4:29 PM  YOB: 1945  Medical Record Number: [id number]  Attending Physician: Jaime Willoughby MD  Consulting Physician: Manuelito Alba MD    CHIEF COMPLIANT: Right hip pain    HISTORY OF PRESENT ILLINESS: Patient is a 71 y.o. year old male presents to [facility] with above complaints.  I was consulted for further evaluation and treatment.  He works for [employer] in the [place] department.  He reports he fell on the floor mats that were on the floor.   He was unable to walk after his fall.  Was brought to [facility] ER for further evaluation.  X-rays were obtained which showed a displaced right intertrochanteric femur fracture.      ALLERGIES: No Known Allergies    HOME MEDICATIONS:  Prescriptions Prior to Admission   Medication Sig Dispense Refill Last Dose   • amLODIPine (NORVASC) 10 MG tablet Take 10 mg by mouth Daily.   6/29/2017 at 2100   • aspirin 81 MG chewable tablet Chew 81 mg Daily.   6/30/2017 at 0900   • atorvastatin (LIPITOR) 10 MG tablet Take 10 mg by mouth Daily.   6/29/2017 at 2100   • carvedilol (COREG) 12.5 MG tablet Take 12.5 mg by mouth Daily.   6/29/2017 at 2100   • losartan (COZAAR) 50 MG tablet Take 100 mg by mouth Daily.   6/29/2017 at 2100   • sulindac (CLINORIL) 200 MG tablet Take 200 mg by mouth 2 (Two) Times a Day.   6/30/2017 at 0900       CURRENT MEDICATIONS:  Scheduled Meds:  amLODIPine 10 mg Oral Daily   atorvastatin 10 mg Oral Daily   carvedilol 12.5 mg Oral Daily   losartan 100 mg Oral Daily     Continuous Infusions:   PRN Meds:.•  acetaminophen  •  Morphine  •  sodium chloride  •  Insert peripheral IV **AND** sodium chloride    Past Medical History:   Diagnosis Date   • Arthritis    • Hyperlipidemia    • Hypertension    • Irregular cardiac rhythm      History reviewed. No pertinent surgical history.  Social History     Occupational History   • Not on file.      Social History Main Topics   • Smoking status: Current Every Day Smoker   • Smokeless tobacco: Not on file   • Alcohol use No   • Drug use: No   • Sexual activity: Defer    Social History     Social History Narrative   • No narrative on file     History reviewed. No pertinent family history.    REVIEW OF SYSTEMS:    HEENT: Patient denies any headaches, vision changes, change in hearing, or tinnitus, Patient denies epistaxis, sinus pain, hoarseness, or dysphagia   Pulmonary: Patient denies any cough, congestion, acute change in SOA or wheezing.   Cardiovascular: Patient denies any change in chest pain, dyspnea, palpitations, weakness, intolerance of exercise, varicosities, change in murmur   Gastrointestinal:  Patient denies change in appetite, melena, change in bowel habits.   Genital/Urinary: Patient denies dysuria, change in color of urine, change in frequency of urination, pain with urgency, change in incontinence, retention.   Musculoskeletal: Patient denies complaints of acute changes in symptoms of other joints not mentioned above.   Neurological: Patient denies changes in dizziness, tremor, ataxia, or difficulty in speaking or changes in memory.   Endocrine system: Patient denies acute changes in tremors, palpitations, polyuria, polydipsia, polyphagia, diaphoresis, exophthalmos, or goiter.   Psychological: Patient denies thoughts/plans or harming self or other; denies acute changes in depression,  insomnia, night terrors, lizett, disorientation.   Skin: Patient denies any bruising, rashes, discoloration, pruritus,or wounds not mentioned in history of present illness or chief complaint above.   Hematopoietic: Patient denies current bleeding, epistaxis, hematuria, or melena.    PHYSICAL EXAM:   Vitals:  Vitals:    06/30/17 1951 06/30/17 2049 06/30/17 2324 07/01/17 0322   BP: 160/100 177/94 165/77  Comment: RN notified 152/76   BP Location:  Left arm Left arm Left arm   Patient Position:  Lying Lying Lying    Pulse:  83 59 67   Resp:  18 18 18   Temp:  96.9 °F (36.1 °C) 97.3 °F (36.3 °C) 97.6 °F (36.4 °C)   TempSrc:  Oral Oral Oral   SpO2:  94% 93% 95%   Weight:       Height:           General:  71 y.o. male who appears about stated age.    Alert, cooperative, in no acute distress         Head:    Normocephalic, without obvious abnormality, atraumatic   Eyes:            Lids and lashes normal, conjunctivae and sclerae normal, no         icterus, no pallor, corneas clear, PERRLA   Ears:    Ears appear intact with no abnormalities noted   Throat:   No oral lesions, no thrush, oral mucosa moist   Neck:   No adenopathy, supple, trachea midline, no JVD   Back:     Limited exam shows no severe kyphosis present,no visible           erythema, no excessive  tenderness to palpation.    Lungs:     Respirations regular, even and unlabored.     Heart:    Normal rate, Pulses palpable   Chest Wall:    No abnormalities observed.   Abdomen:     Normal bowel sounds, no masses, no organomegaly, soft              non-tender, non-distended, no guarding, no rebound                      tenderness   Rectal:     Deferred   Pulses:   Pulses palpable and equal bilaterally   Skin:   No bleeding, bruising or rash   Lymph nodes:   No palpable adenopathy   Extremities:     Right hip skin intact.  Moderate thigh swelling.  Painful [place].  NVI distally.  Negative homans.  Leg is shortened and externally rotated.      DIAGNOSTIC TEST:  Admission on 06/30/2017   Component Date Value Ref Range Status   • Glucose 06/30/2017 109* 65 - 99 mg/dL Final   • BUN 06/30/2017 12  8 - 23 mg/dL Final   • Creatinine 06/30/2017 1.04  0.76 - 1.27 mg/dL Final   • Sodium 06/30/2017 139  136 - 145 mmol/L Final   • Potassium 06/30/2017 4.3  3.5 - 5.2 mmol/L Final   • Chloride 06/30/2017 101  98 - 107 mmol/L Final   • CO2 06/30/2017 27.7  22.0 - 29.0 mmol/L Final   • Calcium 06/30/2017 9.3  8.6 - 10.5 mg/dL Final   • Total Protein 06/30/2017 7.5  6.0 - 8.5 g/dL Final   •  Albumin 06/30/2017 4.40  3.50 - 5.20 g/dL Final   • ALT (SGPT) 06/30/2017 26  1 - 41 U/L Final   • AST (SGOT) 06/30/2017 17  1 - 40 U/L Final   • Alkaline Phosphatase 06/30/2017 91  39 - 117 U/L Final   • Total Bilirubin 06/30/2017 0.6  0.1 - 1.2 mg/dL Final   • eGFR Non African Amer 06/30/2017 71  >60 mL/min/1.73 Final   • Globulin 06/30/2017 3.1  gm/dL Final   • A/G Ratio 06/30/2017 1.4  g/dL Final   • BUN/Creatinine Ratio 06/30/2017 11.5  7.0 - 25.0 Final   • Anion Gap 06/30/2017 10.3  mmol/L Final   • ABO Type 06/30/2017 A   Final   • RH type 06/30/2017 Positive   Final   • Antibody Screen 06/30/2017 Negative   Final   • WBC 06/30/2017 11.77* 4.50 - 10.70 10*3/mm3 Final   • RBC 06/30/2017 5.04  4.60 - 6.00 10*6/mm3 Final   • Hemoglobin 06/30/2017 16.1  13.7 - 17.6 g/dL Final   • Hematocrit 06/30/2017 47.4  40.4 - 52.2 % Final   • MCV 06/30/2017 94.0  79.8 - 96.2 fL Final   • MCH 06/30/2017 31.9  27.0 - 32.7 pg Final   • MCHC 06/30/2017 34.0  32.6 - 36.4 g/dL Final   • RDW 06/30/2017 13.2  11.5 - 14.5 % Final   • RDW-SD 06/30/2017 45.0  37.0 - 54.0 fl Final   • MPV 06/30/2017 13.9* 6.0 - 12.0 fL Final   • Platelets 06/30/2017 150  140 - 500 10*3/mm3 Final   • Neutrophil % 06/30/2017 71.4  42.7 - 76.0 % Final   • Lymphocyte % 06/30/2017 17.7* 19.6 - 45.3 % Final   • Monocyte % 06/30/2017 7.9  5.0 - 12.0 % Final   • Eosinophil % 06/30/2017 2.0  0.3 - 6.2 % Final   • Basophil % 06/30/2017 0.7  0.0 - 1.5 % Final   • Immature Grans % 06/30/2017 0.3  0.0 - 0.5 % Final   • Neutrophils, Absolute 06/30/2017 8.41* 1.90 - 8.10 10*3/mm3 Final   • Lymphocytes, Absolute 06/30/2017 2.08  0.90 - 4.80 10*3/mm3 Final   • Monocytes, Absolute 06/30/2017 0.93  0.20 - 1.20 10*3/mm3 Final   • Eosinophils, Absolute 06/30/2017 0.23  0.00 - 0.70 10*3/mm3 Final   • Basophils, Absolute 06/30/2017 0.08  0.00 - 0.20 10*3/mm3 Final   • Immature Grans, Absolute 06/30/2017 0.04* 0.00 - 0.03 10*3/mm3 Final   • Glucose 07/01/2017 106* 65 - 99  mg/dL Final   • BUN 07/01/2017 11  8 - 23 mg/dL Final   • Creatinine 07/01/2017 0.96  0.76 - 1.27 mg/dL Final   • Sodium 07/01/2017 139  136 - 145 mmol/L Final   • Potassium 07/01/2017 4.1  3.5 - 5.2 mmol/L Final   • Chloride 07/01/2017 102  98 - 107 mmol/L Final   • CO2 07/01/2017 22.0  22.0 - 29.0 mmol/L Final   • Calcium 07/01/2017 8.9  8.6 - 10.5 mg/dL Final   • Total Protein 07/01/2017 6.8  6.0 - 8.5 g/dL Final   • Albumin 07/01/2017 4.00  3.50 - 5.20 g/dL Final   • ALT (SGPT) 07/01/2017 29  1 - 41 U/L Final   • AST (SGOT) 07/01/2017 23  1 - 40 U/L Final   • Alkaline Phosphatase 07/01/2017 81  39 - 117 U/L Final   • Total Bilirubin 07/01/2017 1.0  0.1 - 1.2 mg/dL Final   • eGFR Non African Amer 07/01/2017 77  >60 mL/min/1.73 Final   • Globulin 07/01/2017 2.8  gm/dL Final   • A/G Ratio 07/01/2017 1.4  g/dL Final   • BUN/Creatinine Ratio 07/01/2017 11.5  7.0 - 25.0 Final   • Anion Gap 07/01/2017 15.0  mmol/L Final   • WBC 07/01/2017 11.17* 4.50 - 10.70 10*3/mm3 Final   • RBC 07/01/2017 4.84  4.60 - 6.00 10*6/mm3 Final   • Hemoglobin 07/01/2017 15.5  13.7 - 17.6 g/dL Final   • Hematocrit 07/01/2017 45.8  40.4 - 52.2 % Final   • MCV 07/01/2017 94.6  79.8 - 96.2 fL Final   • MCH 07/01/2017 32.0  27.0 - 32.7 pg Final   • MCHC 07/01/2017 33.8  32.6 - 36.4 g/dL Final   • RDW 07/01/2017 13.3  11.5 - 14.5 % Final   • RDW-SD 07/01/2017 46.1  37.0 - 54.0 fl Final   • MPV 07/01/2017 13.9* 6.0 - 12.0 fL Final   • Platelets 07/01/2017 134* 140 - 500 10*3/mm3 Final   • Neutrophil % 07/01/2017 65.8  42.7 - 76.0 % Final   • Lymphocyte % 07/01/2017 18.8* 19.6 - 45.3 % Final   • Monocyte % 07/01/2017 12.4* 5.0 - 12.0 % Final   • Eosinophil % 07/01/2017 2.1  0.3 - 6.2 % Final   • Basophil % 07/01/2017 0.6  0.0 - 1.5 % Final   • Immature Grans % 07/01/2017 0.3  0.0 - 0.5 % Final   • Neutrophils, Absolute 07/01/2017 7.35  1.90 - 8.10 10*3/mm3 Final   • Lymphocytes, Absolute 07/01/2017 2.10  0.90 - 4.80 10*3/mm3 Final   • Monocytes,  Absolute 07/01/2017 1.38* 0.20 - 1.20 10*3/mm3 Final   • Eosinophils, Absolute 07/01/2017 0.24  0.00 - 0.70 10*3/mm3 Final   • Basophils, Absolute 07/01/2017 0.07  0.00 - 0.20 10*3/mm3 Final   • Immature Grans, Absolute 07/01/2017 0.03  0.00 - 0.03 10*3/mm3 Final   • TSH 07/01/2017 1.950  0.270 - 4.200 mIU/mL Final   • Magnesium 07/01/2017 2.3  1.6 - 2.4 mg/dL Final       Xr Chest 1 View    Result Date: 6/30/2017  Narrative: 1-VIEW SUPINE CHEST X-RAY  HISTORY: Fell. Chest pain.  FINDINGS:  The lungs are well-expanded and clear, and the heart size is normal. There is no acute disease.  This report was finalized on 6/30/2017 6:11 PM by Dr. Trell Salazar MD.      Xr Hip With Or Without Pelvis 2 - 3 View Right    Result Date: 6/30/2017  Narrative: 2-VIEW RIGHT HIP AND 1-VIEW AP PELVIS  HISTORY: Fell. Right hip pain.  FINDINGS:  There is an acute intertrochanteric fracture involving the right proximal femur without significant angulation deformity.  This report was finalized on 6/30/2017 6:11 PM by Dr. Trell Salazar MD.          ASSESSMENT:  Right intertrochanteric femur fracture    Patient Active Problem List   Diagnosis   • Fall   • Closed right hip fracture   • HTN (hypertension)   • PVC (premature ventricular contraction)       PLAN:    Right hip intramedullary nailing today.    The above diagnosis and treatment plan was discussed with the patient.  They were educated in treatment options for their condition. Risks and benefits of surgery were discussed in detail.  Risk of infection, DVT, PE, malunion, non-union and possible need for repeat surgery were discussed.   They were given the opportunity to ask questions and were answered to their satisfaction.  They agreed to proceed with the above treatment plan.        Manuelito Alba MD  Date: 7/1/2017

## 2017-07-01 NOTE — ANESTHESIA POSTPROCEDURE EVALUATION
Patient: Demetris Nye    Procedure Summary     Date Anesthesia Start Anesthesia Stop Room / Location    07/01/17 0748 0853  [facility] /  [facility]       Procedure Diagnosis Surgeon Provider    RIGHT INTRAMEDULLARY NAILING/RODDING (Right Thigh) No diagnosis on file. MD Lyla Ga MD          Anesthesia Type: general  Last vitals  /83 (07/01/17 0945)    Temp 36.8 °C (98.2 °F) (07/01/17 1000)    Pulse 62 (07/01/17 1000)   Resp 20 (07/01/17 1000)    SpO2 93 % (07/01/17 0945)      Post Anesthesia Care and Evaluation    Patient location during evaluation: bedside  Patient participation: complete - patient participated  Level of consciousness: awake  Pain management: adequate  Airway patency: patent  Anesthetic complications: No anesthetic complications    Cardiovascular status: acceptable  Respiratory status: acceptable  Hydration status: acceptable

## 2017-07-01 NOTE — PLAN OF CARE
Problem: Patient Care Overview (Adult)  Goal: Plan of Care Review  Outcome: Ongoing (interventions implemented as appropriate)    07/01/17 5710   Coping/Psychosocial Response Interventions   Plan Of Care Reviewed With patient   Patient Care Overview   Progress progress toward functional goals as expected   Outcome Evaluation   Outcome Summary/Follow up Plan PATIENT POST OP TODAY FROM AN IM NAILING OF THE RIGHT HIP. PAIN WELL CONTROLLED WITH PO PAIN MEDS. BLOOD PRESSURE IMPROVED POST OP AND WITH PAIN CONTROL.

## 2017-07-01 NOTE — PLAN OF CARE
Problem: Patient Care Overview (Adult)  Goal: Plan of Care Review    07/01/17 1534   Coping/Psychosocial Response Interventions   Plan Of Care Reviewed With patient;spouse   Patient Care Overview   Progress progress toward functional goals is gradual   Outcome Evaluation   Outcome Summary/Follow up Plan Evaluation completed s/p R post op IM nailing. Patient still very groggy and required increased time to process commands/cues. Assist required for bed mobility, transfers, and gait. Not appropriate to ambulate away from bedside at this time. Patient appropriate for skilled therapy in order to return to prior independent level of function.         Problem: Inpatient Physical Therapy  Goal: Bed Mobility Goal LTG- PT    07/01/17 1534   Bed Mobility PT LTG   Bed Mobility PT LTG, Date Established 07/01/17   Bed Mobility PT LTG, Time to Achieve 5 days   Bed Mobility PT LTG, Activity Type all bed mobility   Bed Mobility PT LTG, [place] Level minimum assist (75% patient effort)   Bed Mobility PT Goal LTG, Assist Device bed rails       Goal: Transfer Training Goal 1 LTG- PT    07/01/17 1534   Transfer Training PT LTG   Transfer Training PT LTG, Date Established 07/01/17   Transfer Training PT LTG, Time to Achieve 5 days   Transfer Training PT LTG, Activity Type bed to chair /chair to bed   Transfer Training PT LTG, [place] Level minimum assist (75% patient effort)   Transfer Training PT LTG, Assist Device walker, rolling       Goal: Transfer Training Goal 2 LTG- PT    07/01/17 1534   Transfer Training 2 PT LTG   Transfer Training PT 2 LTG, Date Established 07/01/17   Transfer Training PT 2 LTG, Activity Type sit to stand/stand to sit   Transfer Training PT 2 LTG, [place] Level contact guard assist   Transfer Training PT 2 LTG, Assist Device walker, rolling       Goal: Gait Training Goal LTG- PT    07/01/17 1534   Gait Training PT LTG   Gait Training Goal PT LTG, Date Established 07/01/17   Gait Training Goal PT  LTG, Time to Achieve 5 days   Gait Training Goal PT LTG, [place] Level minimum assist (75% patient effort)   Gait Training Goal PT LTG, Assist Device walker, rolling   Gait Training Goal PT LTG, Distance to Achieve 100 feet

## 2017-07-01 NOTE — THERAPY EVALUATION
Acute Care - Physical Therapy Initial Evaluation  [facility]     Patient Name: Demetris Nye  : 1945  MRN: [id number]  Today's Date: 2017                Admit Date: 2017     Visit Dx:    ICD-10-CM ICD-9-CM   1. Fall, initial encounter W19.XXXA E888.9   2. Closed right hip fracture, initial encounter S72.001A 820.8   3. Impaired mobility Z74.09 799.89     Patient Active Problem List   Diagnosis   • Fall   • Closed right hip fracture   • HTN (hypertension)   • PVC (premature ventricular contraction)     Past Medical History:   Diagnosis Date   • Arthritis    • Hyperlipidemia    • Hypertension    • Irregular cardiac rhythm      History reviewed. No pertinent surgical history.       PT ASSESSMENT (last 72 hours)      PT Evaluation       17 1500 17    Rehab Evaluation    Document Type evaluation  -CK     Subjective Information agree to therapy;complains of;pain;weakness  -CK     Patient Effort, Rehab Treatment fair  -CK     Symptoms Noted During/After Treatment fatigue  -CK     General Information    Patient Profile Review yes  -CK     General Observations supine in bed. no acute distress  -CK     Precautions/Limitations fall precautions;hip precautions- right  -CK     Prior Level of Function independent:  -CK     Equipment Currently Used at Home none  -CK none  -TT    Living Environment    Lives With spouse  -CK spouse  -TT    Living Arrangements house  -CK house  -TT    Home Accessibility  ramps present at home  -TT    Stair Railings at Home  none  -TT    Type of Financial/Environmental Concern  none  -TT    Transportation Available car;family or friend will provide  -CK car;family or friend will provide  -TT    Clinical Impression    Criteria for Skilled Therapeutic Interventions Met yes;treatment indicated  -CK     Pathology/Pathophysiology Noted (Describe Specifically for Each System) musculoskeletal;neuromuscular;cardiovascular  -CK     Impairments Found (describe specific  impairments) aerobic capacity/endurance;gait, locomotion, and balance;integumentary integrity;joint integrity and mobility;muscle performance;ROM  -CK     Rehab Potential good, to achieve stated therapy goals  -CK     Vital Signs    Pre SpO2 (%) 84  -CK     O2 Delivery Pre Treatment supplemental O2   out of nose at time  -CK     Intra SpO2 (%) 90  -CK     O2 Delivery Intra Treatment supplemental O2   4L  -CK     Post SpO2 (%) 94  -CK     Pre Patient Position Supine  -CK     Intra Patient Position Standing  -CK     Post Patient Position Sitting  -CK     Pain Assessment    Pain Assessment 0-10  -CK     Pain Score 7  -CK     Post Pain Score 7  -CK     Pain Type Acute pain;Surgical pain  -CK     Pain Location Hip  -CK     Pain Orientation Right  -CK     Pain Intervention(s) Cold applied;Repositioned;Medication (See MAR)  -CK     Vision Assessment/Intervention    Visual Impairment WNL  -CK     Cognitive Assessment/Intervention    Current Cognitive/Communication Assessment functional  -CK     Orientation Status oriented to;person;place  -CK     Follows Commands/Answers Questions needs increased time;75% of the time  -CK     Personal Safety moderate impairment  -CK     Personal Safety Interventions fall prevention program maintained;gait belt;muscle strengthening facilitated;nonskid shoes/slippers when out of bed;supervised activity  -CK     ROM (Range of Motion)    General ROM Detail BUE AROM full/ RLE AROM limited by pain. LLE AROM full  -CK     MMT (Manual Muscle Testing)    General MMT Assessment Detail BUE 4+/5. BLE NT due to post op status  -CK     Bed Mobility, Assessment/Treatment    Bed Mob, Supine to Sit, [place] moderate assist (50% patient effort);2 person assist required;verbal cues required  -CK     Bed Mob, Sit to Supine, [place] moderate assist (50% patient effort);verbal cues required;2 person assist required  -CK     Transfer Assessment/Treatment    Transfers, Sit-Stand [place] moderate  assist (50% patient effort);verbal cues required;nonverbal cues required (demo/gesture);2 person assist required  -CK     Transfers, Stand-Sit [place] moderate assist (50% patient effort);nonverbal cues required (demo/gesture);verbal cues required;2 person assist required  -CK     Transfers, Sit-Stand-Sit, Assist Device rolling walker  -CK     Gait Assessment/Treatment    Gait, [place] Level moderate assist (50% patient effort);2 person assist required;nonverbal cues required (demo/gesture);verbal cues required  -CK     Gait, Assistive Device rolling walker  -CK     Gait, Distance (Feet) 4   sidestepping to head of bed  -CK     Gait, Gait Deviations weight-shifting ability decreased;atmara decreased;decreased heel strike;forward flexed posture;step length decreased  -CK     Therapy Exercises    Bilateral Lower Extremities AROM:;10 reps;sitting;ankle pumps/circles;hip abduction/adduction;LAQ;quad sets  -CK     Positioning and Restraints    Pre-Treatment Position in bed  -CK     Post Treatment Position bed  -CK     In Bed supine;call light within reach;with family/caregiver  -CK       User Key  (r) = Recorded By, (t) = Taken By, (c) = Cosigned By    Initials Name Provider Type    CK Jay Núñez PT Physical Therapist    TT Anita Butterfield RN Registered Nurse          Physical Therapy Education     Title: PT OT SLP Therapies (Active)     Topic: Physical Therapy (Active)     Point: Mobility training (Done)    Learning Progress Summary    Learner Readiness Method Response Comment Documented by Status   Patient Acceptance E VU Educated on WBAT status CK 07/01/17 1533 Done               Point: Precautions (Done)    Learning Progress Summary    Learner Readiness Method Response Comment Documented by Status   Patient Acceptance E VU Educated on WBAT status CK 07/01/17 1533 Done                      User Key     Initials Effective Dates Name Provider Type Discipline     04/24/15 -  Jay Núñez, PT Physical  Therapist PT                PT Recommendation and Plan  PT Frequency: daily  Plan of Care Review  Plan Of Care Reviewed With: patient, spouse  Progress: progress toward functional goals is gradual  Outcome Summary/Follow up Plan: Evaluation completed s/p R post op IM nailing. Patient still very groggy and required increased time to process commands/cues. Assist required for bed mobility, transfers, and gait. Not appropriate to ambulate away from bedside at this time. Patient appropriate for skilled therapy in order to return to prior independent level of function.          IP PT Goals       07/01/17 1534          Bed Mobility PT LTG    Bed Mobility PT LTG, Date Established 07/01/17  -CK      Bed Mobility PT LTG, Time to Achieve 5 days  -CK      Bed Mobility PT LTG, Activity Type all bed mobility  -CK      Bed Mobility PT LTG, [place] Level minimum assist (75% patient effort)  -CK      Bed Mobility PT Goal  LTG, Assist Device bed rails  -CK      Transfer Training PT LTG    Transfer Training PT LTG, Date Established 07/01/17  -CK      Transfer Training PT LTG, Time to Achieve 5 days  -CK      Transfer Training PT LTG, Activity Type bed to chair /chair to bed  -CK      Transfer Training PT LTG, [place] Level minimum assist (75% patient effort)  -CK      Transfer Training PT LTG, Assist Device walker, rolling  -CK      Transfer Training 2 PT LTG    Transfer Training PT 2 LTG, Date Established 07/01/17  -CK      Transfer Training PT 2 LTG, Activity Type sit to stand/stand to sit  -CK      Transfer Training PT 2 LTG, [place] Level contact guard assist  -CK      Transfer Training PT 2 LTG, Assist Device walker, rolling  -CK      Gait Training PT LTG    Gait Training Goal PT LTG, Date Established 07/01/17  -CK      Gait Training Goal PT LTG, Time to Achieve 5 days  -CK      Gait Training Goal PT LTG, [place] Level minimum assist (75% patient effort)  -CK      Gait Training Goal PT LTG, Assist Device  walker, rolling  -CK      Gait Training Goal PT LTG, Distance to Achieve 100 feet  -CK        User Key  (r) = Recorded By, (t) = Taken By, (c) = Cosigned By    Initials Name Provider Type    MARILOU Núñez PT Physical Therapist                Outcome Measures       07/01/17 1500          How much help from another person do you currently need...    Turning from your back to your side while in flat bed without using bedrails? 2  -CK      Moving from lying on back to sitting on the side of a flat bed without bedrails? 2  -CK      Moving to and from a bed to a chair (including a wheelchair)? 2  -CK      Standing up from a chair using your arms (e.g., wheelchair, bedside chair)? 2  -CK      Climbing 3-5 steps with a railing? 1  -CK      To walk in hospital room? 2  -CK      AM-PAC 6 Clicks Score 11  -CK      Functional Assessment    Outcome Measure Options AM-PAC 6 Clicks Basic Mobility (PT)  -CK        User Key  (r) = Recorded By, (t) = Taken By, (c) = Cosigned By    Initials Name Provider Type    MARILOU Núñez PT Physical Therapist           Time Calculation:         PT Charges       07/01/17 1537          Time Calculation    Start Time 1500  -CK      Stop Time 1530  -CK      Time Calculation (min) 30 min  -CK      PT Received On 07/01/17  -CK      PT - Next Appointment 07/02/17  -CK      PT Goal Re-Cert Due Date 07/07/17  -CK        User Key  (r) = Recorded By, (t) = Taken By, (c) = Cosigned By    Initials Name Provider Type    MARILOU Núñez PT Physical Therapist          Therapy Charges for Today     Code Description Service Date Service Provider Modifiers Qty    [id number]  PT EVAL LOW COMPLEXITY 2 7/1/2017 Jay Núñez, PT GP 1          PT G-Codes  Outcome Measure Options: AM-PAC 6 Clicks Basic Mobility (PT)      Jay Núñez PT  7/1/2017

## 2017-07-01 NOTE — OP NOTE
FEMUR INTRAMEDULLARY NAILING/RODDING  Procedure Note    Demetris Nye  6/30/2017 - 7/1/2017    Pre-op Diagnosis: Right Intertrochanteric Femur Fracture  Post-op Diagnosis: Same  Procedure:  Right Hip Intramedullary Nailing  Anesthesia: General, Anesthesiologist: Lyla Dooley MD  CRNA: Emre Montero CRNA  Staff: Circulator: Cinthya Azul RN  Radiology Technologist: Mary Ellen Allen  Scrub Person: Haily Sr  Vendor Representative: Jake Oliveira  Estimated Blood Loss: 75 mL  Specimens: * No orders in the log *  Drains: none  Complications: None    Components Utilized:   Danette Cephalomeduallary nail short 125 angle, size 11 mm          Lag screw 110 mm length      Cortical screw 35 mm length        Indication for Procedure:   The patient is a 71 y.o. male presents today for hip intramedullary nailing for management of the patients intertrochanteric femur fracture. The patient was educated in risks of surgery that could include possible risk of infection, fracture malunion, non-union, deep venous thrombosis, pulmonary embolism, fracture, neurovascular injury, leg length discrepancy, dislocation, possible persistent pain, avascular necrosis, need for additional surgeries, anesthetic risks, medical risks including heart attack and stroke, and death.  Also discussion of other operative treatments were discussed though not advised. The discussion occurred on the floor and in pre-op holding area pre-operatively, and patient had the opportunity to ask questions, and concerns about the proposed surgery.  The patient also understood that medicine is not an exact science, and that outcomes of the surgical procedure may be less than desired. The patient wished to proceed.      Protocols for intravenous antibiotics and venous thrombosis were followed for this patient.  IV antibiotics were infused prior to surgery and will be discontinued within 24 hours of completion of the surgical procedure.   Thrombosis prophylaxis will be initiated within 24 hours of the completion of the surgical procedure.      Procedure:   After the patient was identified in the preoperative area, and the surgical site confirmed and marked, the patient was brought to the operating room on a stretcher.  The above anesthetic was placed uneventfully on the stretcher and the patient was transferred to the [place] operating room table.  A time-out procedure was performed.  The intravenous antibiotics infusion was completed. The fracture was closed reduced under c-arm guidance by placing longitudinal traction on the leg and internally rotated.  The operative leg was then prepped and draped in usual sterile fashion.     A 10 blade scalpel was then used to make an lateral based incision superior to the greater trochanter and was dissected down to the greater trochanter.  A sharp awl was then placed in the tip of the greater trochanter and with C-arm guidance was placed into the medullary canal.  The ball tip guidewire was then placed into the medullary canal.  The above was then placed into the medullary canal over the guide wire.  Then a lateral based incision using the out  guide was made and the drill guide was placed on the lateral cortex.  Then with C-arm guidance, the threaded K-wire was placed into the femoral head center on AP and Lateral views and within 25 mm of pole of femoral head.  Then the [employer] tree drill was then used over the k-wire.  The above lag screw was then placed over the guide wire, and the lag screw was then placed.  The traction was removed and the fracture was compressed using the out  guide.  The set screw was placed superiorly and backed off a 1/4 turn to allow the fracture to continue to compress with weight bearing.  Then a final stab incision was made using the guide over the static locking screw hole.  The 4.3 mm drill was then used and measured and the above screw was placed.  Final C-arm  images were obtained which showed good fracture reduction and hardware placement.  The wounds were copiously irrigated and closed with 0-vicyl for fascia, 2-O vicryl for deep dermis and staples for skin.  Sterile dressings were applied.  The patient was awaken from anesthesia and returned to recovery in stable condition.  Sponge and needle counts were correct at the completion and there were no intra-operative complications.    Manuelito Alba MD

## 2017-07-01 NOTE — ANESTHESIA PROCEDURE NOTES
Airway  Urgency: elective    Airway not difficult    General Information and Staff    Patient location during procedure: OR  Anesthesiologist: LINDSEY SERVIN  CRNA: SALEEM HILARIO    Indications and Patient Condition  Indications for airway management: airway protection    Preoxygenated: yes  MILS maintained throughout  Mask difficulty assessment: 2 - vent by mask + OA or adjuvant +/- NMBA    Final Airway Details  Final airway type: endotracheal airway      Successful airway: ETT  Cuffed: yes   Successful intubation technique: direct laryngoscopy  Blade: Ryne  Blade size: #4  ETT size: 7.5 mm  Cormack-Lehane Classification: grade IIa - partial view of glottis  Placement verified by: chest auscultation and capnometry   Measured from: gums  ETT to gums (cm): 23  Number of attempts at approach: 1

## 2017-07-01 NOTE — PLAN OF CARE
Problem: Patient Care Overview (Adult)  Goal: Discharge Needs Assessment  Outcome: Ongoing (interventions implemented as appropriate)    Problem: Orthopaedic Fracture (Adult)  Goal: Signs and Symptoms of Listed Potential Problems Will be Absent or Manageable (Orthopaedic Fracture)  Outcome: Ongoing (interventions implemented as appropriate)    Problem: Fall Risk (Adult)  Goal: Absence of Falls  Outcome: Ongoing (interventions implemented as appropriate)    Problem: Perioperative Period (Adult)  Goal: Signs and Symptoms of Listed Potential Problems Will be Absent or Manageable (Perioperative Period)  Outcome: Ongoing (interventions implemented as appropriate)

## 2017-07-01 NOTE — ANESTHESIA PREPROCEDURE EVALUATION
Anesthesia Evaluation     NPO Solid Status: > 8 hours       Airway   Mallampati: I  TM distance: >3 FB  Neck ROM: full  no difficulty expected  Dental    (+) edentulous    Pulmonary - normal exam   (+) a smoker Current,   Cardiovascular - normal exam    (+) hypertension, hyperlipidemia      Neuro/Psych  (-) seizures, neuromuscular disease, TIA, CVA  GI/Hepatic/Renal/Endo    (+) obesity,  diabetes mellitus type 2 well controlled,     Musculoskeletal     Abdominal   (+) obese,    Substance History      OB/GYN          Other   (+) arthritis                                     Anesthesia Plan    ASA 3     general     intravenous induction   Anesthetic plan and risks discussed with patient.

## 2017-07-02 LAB
HCT VFR BLD AUTO: 41.9 % (ref 40.4–52.2)
HGB BLD-MCNC: 14.1 G/DL (ref 13.7–17.6)

## 2017-07-02 PROCEDURE — 25010000002 ENOXAPARIN PER 10 MG: Performed by: ORTHOPAEDIC SURGERY

## 2017-07-02 PROCEDURE — 97110 THERAPEUTIC EXERCISES: CPT | Performed by: PHYSICAL THERAPIST

## 2017-07-02 PROCEDURE — 25010000003 CEFAZOLIN IN DEXTROSE 2-4 GM/100ML-% SOLUTION: Performed by: ORTHOPAEDIC SURGERY

## 2017-07-02 PROCEDURE — 85018 HEMOGLOBIN: CPT | Performed by: ORTHOPAEDIC SURGERY

## 2017-07-02 PROCEDURE — 85014 HEMATOCRIT: CPT | Performed by: ORTHOPAEDIC SURGERY

## 2017-07-02 RX ADMIN — LOSARTAN POTASSIUM 100 MG: 50 TABLET, FILM COATED ORAL at 08:12

## 2017-07-02 RX ADMIN — CARVEDILOL 12.5 MG: 12.5 TABLET, FILM COATED ORAL at 08:12

## 2017-07-02 RX ADMIN — OXYCODONE HYDROCHLORIDE AND ACETAMINOPHEN 2 TABLET: 10; 325 TABLET ORAL at 08:12

## 2017-07-02 RX ADMIN — OXYCODONE HYDROCHLORIDE AND ACETAMINOPHEN 2 TABLET: 10; 325 TABLET ORAL at 22:08

## 2017-07-02 RX ADMIN — OXYCODONE HYDROCHLORIDE AND ACETAMINOPHEN 2 TABLET: 10; 325 TABLET ORAL at 17:20

## 2017-07-02 RX ADMIN — ATORVASTATIN CALCIUM 10 MG: 10 TABLET, FILM COATED ORAL at 08:12

## 2017-07-02 RX ADMIN — CEFAZOLIN SODIUM 2 G: 2 INJECTION, SOLUTION INTRAVENOUS at 00:15

## 2017-07-02 RX ADMIN — SENNA AND DOCUSATE SODIUM 2 TABLET: 50; 8.6 TABLET, FILM COATED ORAL at 08:12

## 2017-07-02 RX ADMIN — ENOXAPARIN SODIUM 40 MG: 40 INJECTION SUBCUTANEOUS at 08:13

## 2017-07-02 RX ADMIN — AMLODIPINE BESYLATE 10 MG: 10 TABLET ORAL at 08:12

## 2017-07-02 NOTE — PROGRESS NOTES
" LOS: 2 days     Name: Demetris Nye  Age: 71 y.o.  Sex: male  :  1945  MRN: [id number]         Primary Care Physician: Rian Morfin MD    Subjective   Subjective  Pain controlled.  Denies CP or SOA.  Anxious to get out of bed today    Objective   Vital Signs  Temp:  [97 °F (36.1 °C)-98.2 °F (36.8 °C)] 97.7 °F (36.5 °C)  Heart Rate:  [56-70] 56  Resp:  [16-22] 18  BP: (125-170)/(69-97) 139/86  Body mass index is 35.67 kg/(m^2).    Objective:  General Appearance:  Comfortable and in no acute distress.    Vital signs: (most recent): Blood pressure 139/86, pulse 56, temperature 97.7 °F (36.5 °C), temperature source Oral, resp. rate 18, height 72\" (182.9 cm), weight 263 lb (119 kg), SpO2 91 %.    Lungs:  Normal respiratory rate and normal effort.    Heart: Normal rate.  Regular rhythm.    Abdomen: Abdomen is soft.  Bowel sounds are normal.   There is no abdominal tenderness.     Extremities: There is no local swelling or dependent edema.    Neurological: Patient is alert and oriented to person, place and time.    Skin:  Warm and dry.              Results Review:       I reviewed the patient's new clinical results.      Results from last 7 days  Lab Units 17  0345 17  0324 17  1735   WBC 10*3/mm3  --  11.17* 11.77*   HEMOGLOBIN g/dL 14.1 15.5 16.1   PLATELETS 10*3/mm3  --  134* 150       Results from last 7 days  Lab Units 17  0324 17  1735   SODIUM mmol/L 139 139   POTASSIUM mmol/L 4.1 4.3   CHLORIDE mmol/L 102 101   CO2 mmol/L 22.0 27.7   BUN mg/dL 11 12   CREATININE mg/dL 0.96 1.04   CALCIUM mg/dL 8.9 9.3   GLUCOSE mg/dL 106* 109*                 Scheduled Meds:     amLODIPine 10 mg Oral Daily   atorvastatin 10 mg Oral Daily   carvedilol 12.5 mg Oral Daily   enoxaparin 40 mg Subcutaneous Daily   losartan 100 mg Oral Daily   sennosides-docusate sodium 2 tablet Oral BID     PRN Meds:   •  acetaminophen  •  bisacodyl  •  diphenhydrAMINE  •  HYDROcodone-acetaminophen  •  " HYDROmorphone **AND** naloxone  •  ketorolac  •  melatonin  •  ondansetron **OR** ondansetron ODT **OR** ondansetron  •  oxyCODONE-acetaminophen  •  pneumococcal polysaccharide 23-valent  •  sodium chloride  Continuous Infusions:       Assessment/Plan   Principal Problem:    Closed right hip fracture  Active Problems:    Fall    HTN (hypertension)    PVC (premature ventricular contraction)      Assessment & Plan    - S/p right hip nailing. Appears medically stable at this time. No complaints of SOA or CP  - Continue current medications.  Blood pressure is stable  - Lovenox has been added for DVT proph  - PT to see  - Use IS. Has been weaned off o2    Demetris Mccoy MD  [facility]  07/02/17  8:30 AM

## 2017-07-02 NOTE — PLAN OF CARE
Problem: Patient Care Overview (Adult)  Goal: Plan of Care Review    07/02/17 0929   Coping/Psychosocial Response Interventions   Plan Of Care Reviewed With patient   Patient Care Overview   Progress improving   Outcome Evaluation   Outcome Summary/Follow up Plan Improved ambulation noted today. Improved cognition (less groggy and did not require increased time to follow commands). Ambulation in room. Verbal cueing for step length on the R and breathing as patient tends to hold his breath.

## 2017-07-02 NOTE — PROGRESS NOTES
Procedure(s):  RIGHT INTRAMEDULLARY NAILING/RODDING     LOS: 2 days     Subjective :   Complains of pain, though better after surgery.  Ambulated with P.T.      Objective :    Vital signs in last 24 hours:  Vitals:    07/01/17 1942 07/01/17 2332 07/02/17 0344 07/02/17 0700   BP: 128/69 125/73 130/79 139/86   BP Location: Left arm Left arm Left arm Left arm   Patient Position: Lying Lying Lying Lying   Pulse: 65 62 67 56   Resp: 18 18 18 18   Temp: 98.1 °F (36.7 °C) 97.6 °F (36.4 °C) 97 °F (36.1 °C) 97.7 °F (36.5 °C)   TempSrc: Oral Oral Oral Oral   SpO2: 94% 97% 91% 91%   Weight:       Height:           PHYSICAL EXAM:  Patient is calm, in no acute distress, awake and oriented x 3.  Dressing is clean, dry and intact.  No signs of infection.  Swelling is appropriate in amount.  Ecchymosis is appropriate in amount.  Homans test is negative.  Patient is neurovascularly intact distally.    LABS:    Results from last 7 days  Lab Units 07/02/17  0345 07/01/17  0324   WBC 10*3/mm3  --  11.17*   HEMOGLOBIN g/dL 14.1 15.5   HEMATOCRIT % 41.9 45.8   PLATELETS 10*3/mm3  --  134*       Results from last 7 days  Lab Units 07/01/17  0324   SODIUM mmol/L 139   POTASSIUM mmol/L 4.1   CHLORIDE mmol/L 102   CO2 mmol/L 22.0   BUN mg/dL 11   CREATININE mg/dL 0.96   GLUCOSE mg/dL 106*   CALCIUM mg/dL 8.9           ASSESSMENT:  Status post Procedure(s):  RIGHT INTRAMEDULLARY NAILING/RODDING      Plan:  Continue Physical Therapy, increase mobility and range of motion as tolerated.  Continue SCDs, Continue Lovenox for DVT prophylaxis while inpatient.  Dispo planning for home vs rehab.    Manuelito Alba MD    Date: 7/2/2017  Time: 9:58 AM

## 2017-07-02 NOTE — PLAN OF CARE
Problem: Patient Care Overview (Adult)  Goal: Plan of Care Review  Outcome: Ongoing (interventions implemented as appropriate)    07/02/17 1600   Coping/Psychosocial Response Interventions   Plan Of Care Reviewed With patient   Patient Care Overview   Progress improving   Outcome Evaluation   Outcome Summary/Follow up Plan pain under control and htn under control with meds, patient educated on htn medications       Goal: Adult Individualization and Mutuality  Outcome: Ongoing (interventions implemented as appropriate)  Goal: Discharge Needs Assessment  Outcome: Ongoing (interventions implemented as appropriate)    Problem: Orthopaedic Fracture (Adult)  Goal: Signs and Symptoms of Listed Potential Problems Will be Absent or Manageable (Orthopaedic Fracture)  Outcome: Ongoing (interventions implemented as appropriate)    Problem: Fall Risk (Adult)  Goal: Absence of Falls  Outcome: Ongoing (interventions implemented as appropriate)    Problem: Perioperative Period (Adult)  Goal: Signs and Symptoms of Listed Potential Problems Will be Absent or Manageable (Perioperative Period)  Outcome: Ongoing (interventions implemented as appropriate)

## 2017-07-02 NOTE — PLAN OF CARE
Problem: Patient Care Overview (Adult)  Goal: Plan of Care Review  Outcome: Ongoing (interventions implemented as appropriate)    07/02/17 0405   Coping/Psychosocial Response Interventions   Plan Of Care Reviewed With patient   Patient Care Overview   Progress improving   Outcome Evaluation   Outcome Summary/Follow up Plan VSS, blood pressure controlled through the night with good pain control, voiding per urinal, home versus rehab on discharge       Goal: Adult Individualization and Mutuality  Outcome: Ongoing (interventions implemented as appropriate)  Goal: Discharge Needs Assessment  Outcome: Ongoing (interventions implemented as appropriate)    Problem: Orthopaedic Fracture (Adult)  Goal: Signs and Symptoms of Listed Potential Problems Will be Absent or Manageable (Orthopaedic Fracture)  Outcome: Ongoing (interventions implemented as appropriate)    Problem: Fall Risk (Adult)  Goal: Absence of Falls  Outcome: Ongoing (interventions implemented as appropriate)    Problem: Perioperative Period (Adult)  Goal: Signs and Symptoms of Listed Potential Problems Will be Absent or Manageable (Perioperative Period)  Outcome: Ongoing (interventions implemented as appropriate)

## 2017-07-02 NOTE — THERAPY TREATMENT NOTE
Acute Care - Physical Therapy Treatment Note  [facility]     Patient Name: Demetris Nye  : 1945  MRN: [id number]  Today's Date: 2017             Admit Date: 2017    Visit Dx:    ICD-10-CM ICD-9-CM   1. Fall, initial encounter W19.XXXA E888.9   2. Closed right hip fracture, initial encounter S72.001A 820.8   3. Impaired mobility Z74.09 799.89     Patient Active Problem List   Diagnosis   • Fall   • Closed right hip fracture   • HTN (hypertension)   • PVC (premature ventricular contraction)               Adult Rehabilitation Note       17 0900          Rehab Assessment/Intervention    Discipline physical therapist  -CK      Document Type therapy note (daily note)  -CK      Subjective Information agree to therapy;complains of;pain  -CK      Patient Effort, Rehab Treatment good  -CK      Precautions/Limitations fall precautions;hip precautions- right  -CK      Recorded by [CK] Jay Núñez, PT      Vital Signs    O2 Delivery Pre Treatment room air  -CK      Recorded by [CK] Jay Núñez, PT      Pain Assessment    Pain Assessment 0-10  -CK      Pain Score 4  -CK      Post Pain Score 4  -CK      Pain Type Acute pain;Surgical pain  -CK      Pain Location Hip  -CK      Pain Orientation Right  -CK      Pain Intervention(s) Medication (See MAR);Repositioned  -CK      Recorded by [CK] Jay Núñez, PT      Bed Mobility, Assessment/Treatment    Bed Mob, Supine to Sit, [place] minimum assist (75% patient effort);2 person assist required;verbal cues required  -CK      Bed Mob, Sit to Supine, [place] minimum assist (75% patient effort);2 person assist required;verbal cues required  -CK      Recorded by [CK] Jay Núñez, PT      Transfer Assessment/Treatment    Transfers, Sit-Stand [place] verbal cues required;moderate assist (50% patient effort)  -CK      Transfers, Stand-Sit [place] moderate assist (50% patient effort);verbal cues required  -CK      Transfers,  Sit-Stand-Sit, Assist Device rolling walker  -CK      Recorded by [CK] Jay Núñez, PT      Gait Assessment/Treatment    Gait, [place] Level minimum assist (75% patient effort);2 person assist required;verbal cues required  -CK      Gait, Assistive Device rolling walker  -CK      Gait, Distance (Feet) 12  -CK      Gait, Gait Deviations tamara decreased;decreased heel strike;forward flexed posture;step length decreased  -CK      Recorded by [CK] Jay Núñez, PT      Therapy Exercises    Bilateral Lower Extremities AROM:;15 reps;sitting;glut sets;ankle pumps/circles;hip abduction/adduction;LAQ;quad sets  -CK      Recorded by [CK] Jay Núñez, PT      Positioning and Restraints    Pre-Treatment Position in bed  -CK      Post Treatment Position chair  -CK      In Chair reclined;sitting;call light within reach;with family/caregiver  -CK      Recorded by [CK] Jay Núñez, PT        User Key  (r) = Recorded By, (t) = Taken By, (c) = Cosigned By    Initials Name Effective Dates    CK Jay Núñez, PT 04/24/15 -                 IP PT Goals       07/01/17 1534          Bed Mobility PT LTG    Bed Mobility PT LTG, Date Established 07/01/17  -CK      Bed Mobility PT LTG, Time to Achieve 5 days  -CK      Bed Mobility PT LTG, Activity Type all bed mobility  -CK      Bed Mobility PT LTG, [place] Level minimum assist (75% patient effort)  -CK      Bed Mobility PT Goal  LTG, Assist Device bed rails  -CK      Transfer Training PT LTG    Transfer Training PT LTG, Date Established 07/01/17  -CK      Transfer Training PT LTG, Time to Achieve 5 days  -CK      Transfer Training PT LTG, Activity Type bed to chair /chair to bed  -CK      Transfer Training PT LTG, [place] Level minimum assist (75% patient effort)  -CK      Transfer Training PT LTG, Assist Device walker, rolling  -CK      Transfer Training 2 PT LTG    Transfer Training PT 2 LTG, Date Established 07/01/17  -CK      Transfer Training PT 2 LTG, Activity  Type sit to stand/stand to sit  -CK      Transfer Training PT 2 LTG, [place] Level contact guard assist  -CK      Transfer Training PT 2 LTG, Assist Device walker, rolling  -CK      Gait Training PT LTG    Gait Training Goal PT LTG, Date Established 07/01/17  -CK      Gait Training Goal PT LTG, Time to Achieve 5 days  -CK      Gait Training Goal PT LTG, [place] Level minimum assist (75% patient effort)  -CK      Gait Training Goal PT LTG, Assist Device walker, rolling  -CK      Gait Training Goal PT LTG, Distance to Achieve 100 feet  -CK        User Key  (r) = Recorded By, (t) = Taken By, (c) = Cosigned By    Initials Name Provider Type    CK Jay Núñez PT Physical Therapist          Physical Therapy Education     Title: PT OT SLP Therapies (Active)     Topic: Physical Therapy (Active)     Point: Mobility training (Done)    Learning Progress Summary    Learner Readiness Method Response Comment Documented by Status   Patient Acceptance E VU Educated on WBAT status CK 07/01/17 1533 Done               Point: Home exercise program (Done)    Learning Progress Summary    Learner Readiness Method Response Comment Documented by Status   Patient Acceptance E VU  CK 07/02/17 0929 Done               Point: Precautions (Done)    Learning Progress Summary    Learner Readiness Method Response Comment Documented by Status   Patient Acceptance E VU Educated on WBAT status CK 07/01/17 1533 Done                      User Key     Initials Effective Dates Name Provider Type Discipline     04/24/15 -  Jay Núñez PT Physical Therapist PT                    PT Recommendation and Plan  PT Frequency: daily  Plan of Care Review  Plan Of Care Reviewed With: patient  Progress: improving  Outcome Summary/Follow up Plan: Improved ambulation noted today. Improved cognition (less groggy and did not require increased time to follow commands). Ambulation in room. Verbal cueing for step length on the R and breathing as patient  tends to hold his breath.           Outcome Measures       07/02/17 0900 07/01/17 1500       How much help from another person do you currently need...    Turning from your back to your side while in flat bed without using bedrails? 3  -CK 2  -CK     Moving from lying on back to sitting on the side of a flat bed without bedrails? 2  -CK 2  -CK     Moving to and from a bed to a chair (including a wheelchair)? 2  -CK 2  -CK     Standing up from a chair using your arms (e.g., wheelchair, bedside chair)? 2  -CK 2  -CK     Climbing 3-5 steps with a railing? 2  -CK 1  -CK     To walk in hospital room? 2  -CK 2  -CK     AM-PAC 6 Clicks Score 13  -CK 11  -CK     Functional Assessment    Outcome Measure Options AM-PAC 6 Clicks Basic Mobility (PT)  -CK AM-PAC 6 Clicks Basic Mobility (PT)  -CK       User Key  (r) = Recorded By, (t) = Taken By, (c) = Cosigned By    Initials Name Provider Type    MARILOU Núñez PT Physical Therapist           Time Calculation:         PT Charges       07/02/17 0931          Time Calculation    Start Time 0845  -CK      Stop Time 0915  -CK      Time Calculation (min) 30 min  -CK      PT Received On 07/02/17  -CK      PT - Next Appointment 07/03/17  -CK      PT Goal Re-Cert Due Date 07/07/17  -CK        User Key  (r) = Recorded By, (t) = Taken By, (c) = Cosigned By    Initials Name Provider Type    MARILOU Núñez PT Physical Therapist          Therapy Charges for Today     Code Description Service Date Service Provider Modifiers Qty    03594619334 HC PT EVAL LOW COMPLEXITY 2 7/1/2017 Jay Núñez, PT GP 1    77039919957 HC PT THER PROC EA 15 MIN 7/2/2017 Jay Núñez, PT GP 2          PT G-Codes  Outcome Measure Options: AM-PAC 6 Clicks Basic Mobility (PT)    Jay Núñez PT  7/2/2017

## 2017-07-03 LAB
HCT VFR BLD AUTO: 41.4 % (ref 40.4–52.2)
HGB BLD-MCNC: 14.1 G/DL (ref 13.7–17.6)

## 2017-07-03 PROCEDURE — 85014 HEMATOCRIT: CPT | Performed by: INTERNAL MEDICINE

## 2017-07-03 PROCEDURE — 85018 HEMOGLOBIN: CPT | Performed by: INTERNAL MEDICINE

## 2017-07-03 PROCEDURE — 94799 UNLISTED PULMONARY SVC/PX: CPT

## 2017-07-03 PROCEDURE — 25010000002 ENOXAPARIN PER 10 MG: Performed by: ORTHOPAEDIC SURGERY

## 2017-07-03 PROCEDURE — 63710000001 DIPHENHYDRAMINE PER 50 MG: Performed by: ORTHOPAEDIC SURGERY

## 2017-07-03 PROCEDURE — 97110 THERAPEUTIC EXERCISES: CPT

## 2017-07-03 RX ORDER — HYDROCODONE BITARTRATE AND ACETAMINOPHEN 5; 325 MG/1; MG/1
1 TABLET ORAL EVERY 6 HOURS PRN
Status: DISCONTINUED | OUTPATIENT
Start: 2017-07-03 | End: 2017-07-03

## 2017-07-03 RX ORDER — HYDROCODONE BITARTRATE AND ACETAMINOPHEN 7.5; 325 MG/1; MG/1
1 TABLET ORAL EVERY 6 HOURS PRN
Status: DISCONTINUED | OUTPATIENT
Start: 2017-07-03 | End: 2017-07-05

## 2017-07-03 RX ADMIN — CARVEDILOL 12.5 MG: 12.5 TABLET, FILM COATED ORAL at 08:59

## 2017-07-03 RX ADMIN — ENOXAPARIN SODIUM 40 MG: 40 INJECTION SUBCUTANEOUS at 08:59

## 2017-07-03 RX ADMIN — SENNA AND DOCUSATE SODIUM 2 TABLET: 50; 8.6 TABLET, FILM COATED ORAL at 18:36

## 2017-07-03 RX ADMIN — ATORVASTATIN CALCIUM 10 MG: 10 TABLET, FILM COATED ORAL at 08:59

## 2017-07-03 RX ADMIN — OXYCODONE HYDROCHLORIDE AND ACETAMINOPHEN 2 TABLET: 10; 325 TABLET ORAL at 03:29

## 2017-07-03 RX ADMIN — SENNA AND DOCUSATE SODIUM 2 TABLET: 50; 8.6 TABLET, FILM COATED ORAL at 08:59

## 2017-07-03 RX ADMIN — DIPHENHYDRAMINE HYDROCHLORIDE 25 MG: 25 CAPSULE ORAL at 13:09

## 2017-07-03 RX ADMIN — HYDROCODONE BITARTRATE AND ACETAMINOPHEN 1 TABLET: 7.5; 325 TABLET ORAL at 16:38

## 2017-07-03 RX ADMIN — OXYCODONE HYDROCHLORIDE AND ACETAMINOPHEN 2 TABLET: 10; 325 TABLET ORAL at 08:59

## 2017-07-03 RX ADMIN — LOSARTAN POTASSIUM 100 MG: 50 TABLET, FILM COATED ORAL at 08:59

## 2017-07-03 RX ADMIN — AMLODIPINE BESYLATE 10 MG: 10 TABLET ORAL at 08:59

## 2017-07-03 NOTE — THERAPY TREATMENT NOTE
Acute Care - Physical Therapy Treatment Note  [facility]     Patient Name: Demetris Nye  : 1945  MRN: [id number]  Today's Date: 7/3/2017             Admit Date: 2017    Visit Dx:    ICD-10-CM ICD-9-CM   1. Fall, initial encounter W19.XXXA E888.9   2. Closed right hip fracture, initial encounter S72.001A 820.8   3. Impaired mobility Z74.09 799.89     Patient Active Problem List   Diagnosis   • Fall   • Closed right hip fracture   • HTN (hypertension)   • PVC (premature ventricular contraction)               Adult Rehabilitation Note       17 1009 17 0900       Rehab Assessment/Intervention    Discipline physical therapist  -MS physical therapist  -CK     Document Type therapy note (daily note)  -MS therapy note (daily note)  -CK     Subjective Information agree to therapy;complains of;weakness;fatigue;pain  -MS agree to therapy;complains of;pain  -CK     Patient Effort, Rehab Treatment adequate  -MS good  -CK     Symptoms Noted Comment Pt. reports pain in his Right L.E. as well as overall fatigue/weakness this AM.  Pt. fatigues quickly with upright mobility.  -MS      Precautions/Limitations fall precautions  -MS fall precautions;hip precautions- right  -CK     Recorded by [MS] Efra Arroyo, PT [CK] Jay Núñez, PT     Vital Signs    O2 Delivery Pre Treatment  room air  -CK     Recorded by  [CK] Jay Núñez, PT     Pain Assessment    Pain Assessment 0-10  -MS 0-10  -CK     Pain Score 5  -MS 4  -CK     Post Pain Score 5  -MS 4  -CK     Pain Type Acute pain;Surgical pain  -MS Acute pain;Surgical pain  -CK     Pain Location Leg  -MS Hip  -CK     Pain Orientation Right  -MS Right  -CK     Pain Intervention(s) Medication (See MAR);Cold applied;Repositioned;Elevated;Rest  -MS Medication (See MAR);Repositioned  -CK     Recorded by [MS] Efra Arroyo, PT [CK] Jay Núñez, PT     Cognitive Assessment/Intervention    Orientation Status oriented to;person;place  -MS      Follows  Commands/Answers Questions 75% of the time;able to follow single-step instructions;needs cueing;needs increased time;needs repetition  -MS      Personal Safety Interventions fall prevention program maintained;gait belt;nonskid shoes/slippers when out of bed;supervised activity  -MS      Recorded by [MS] Efra Arroyo, PT      Bed Mobility, Assessment/Treatment    Bed Mob, Supine to Sit, [place]  minimum assist (75% patient effort);2 person assist required;verbal cues required  -CK     Bed Mob, Sit to Supine, [place]  minimum assist (75% patient effort);2 person assist required;verbal cues required  -CK     Bed Mobility, Comment Pt. up in chair this AM.  -MS      Recorded by [MS] Efra Arroyo, PT [CK] Jay Núñez PT     Transfer Assessment/Treatment    Transfers, Sit-Stand [place] moderate assist (50% patient effort);2 person assist required  -MS verbal cues required;moderate assist (50% patient effort)  -CK     Transfers, Stand-Sit [place] moderate assist (50% patient effort);2 person assist required  -MS moderate assist (50% patient effort);verbal cues required  -CK     Transfers, Sit-Stand-Sit, Assist Device rolling walker  -MS rolling walker  -CK     Recorded by [MS] Efra Arroyo, PT [CK] Jay Núñez PT     Gait Assessment/Treatment    Gait, [place] Level minimum assist (75% patient effort);2 person assist required  -MS minimum assist (75% patient effort);2 person assist required;verbal cues required  -CK     Gait, Assistive Device rolling walker  -MS rolling walker  -CK     Gait, Distance (Feet) 35  -MS 12  -CK     Gait, Gait Deviations right:;antalgic;tamara decreased;decreased heel strike;forward flexed posture;step length decreased  -MS tamara decreased;decreased heel strike;forward flexed posture;step length decreased  -CK     Gait, Safety Issues balance decreased during turns;step length decreased  -MS      Gait, Comment Pt. requires mod. verbal/tactile cues for  posture correction, RWX guidance, and proper gait sequencing.  -MS      Recorded by [MS] Efra Arroyo, PT [CK] Jay Núñez, PT     Therapy Exercises    Bilateral Lower Extremities  AROM:;15 reps;sitting;glut sets;ankle pumps/circles;hip abduction/adduction;LAQ;quad sets  -CK     Exercise Protocols hip ORIF  -MS      Hip ORIF Exercises right:;15 reps;completed protocol  -MS      Recorded by [MS] Efra Arroyo, PT [CK] Jay Núñez, PT     Positioning and Restraints    Pre-Treatment Position sitting in chair/recliner  -MS in bed  -CK     Post Treatment Position chair  -MS chair  -CK     In Chair notified nsg;reclined;sitting;call light within reach;encouraged to call for assist;exit alarm on   Ice pack to Right hip.   -MS reclined;sitting;call light within reach;with family/caregiver  -CK     Recorded by [MS] Efra Arroyo, PT [CK] Jay Núñez, PT       User Key  (r) = Recorded By, (t) = Taken By, (c) = Cosigned By    Initials Name Effective Dates    CK Jay Núñez, PT 04/24/15 -     MS Efra Arroyo, PT 12/01/15 -                 IP PT Goals       07/01/17 1534          Bed Mobility PT LTG    Bed Mobility PT LTG, Date Established 07/01/17  -CK      Bed Mobility PT LTG, Time to Achieve 5 days  -CK      Bed Mobility PT LTG, Activity Type all bed mobility  -CK      Bed Mobility PT LTG, [place] Level minimum assist (75% patient effort)  -CK      Bed Mobility PT Goal  LTG, Assist Device bed rails  -CK      Transfer Training PT LTG    Transfer Training PT LTG, Date Established 07/01/17  -CK      Transfer Training PT LTG, Time to Achieve 5 days  -CK      Transfer Training PT LTG, Activity Type bed to chair /chair to bed  -CK      Transfer Training PT LTG, [place] Level minimum assist (75% patient effort)  -CK      Transfer Training PT LTG, Assist Device walker, rolling  -CK      Transfer Training 2 PT LTG    Transfer Training PT 2 LTG, Date Established 07/01/17  -CK      Transfer Training PT 2  LTG, Activity Type sit to stand/stand to sit  -CK      Transfer Training PT 2 LTG, [place] Level contact guard assist  -CK      Transfer Training PT 2 LTG, Assist Device walker, rolling  -CK      Gait Training PT LTG    Gait Training Goal PT LTG, Date Established 07/01/17  -CK      Gait Training Goal PT LTG, Time to Achieve 5 days  -CK      Gait Training Goal PT LTG, [place] Level minimum assist (75% patient effort)  -CK      Gait Training Goal PT LTG, Assist Device walker, rolling  -CK      Gait Training Goal PT LTG, Distance to Achieve 100 feet  -CK        User Key  (r) = Recorded By, (t) = Taken By, (c) = Cosigned By    Initials Name Provider Type    MARILOU Núñez, PT Physical Therapist          Physical Therapy Education     Title: PT OT SLP Therapies (Done)     Topic: Physical Therapy (Done)     Point: Mobility training (Done)    Learning Progress Summary    Learner Readiness Method Response Comment Documented by Status   Patient Acceptance ED RENAN,NR  MS 07/03/17 1013 Done    Acceptance E,TB VU   07/02/17 1600 Done    Acceptance E VU Educated on WBAT status  07/01/17 1533 Done   Family Acceptance E,TB VU   07/02/17 1600 Done               Point: Home exercise program (Done)    Learning Progress Summary    Learner Readiness Method Response Comment Documented by Status   Patient Acceptance EELLEN,NR  MS 07/03/17 1013 Done    Acceptance E,TB VU   07/02/17 1600 Done    Acceptance E VU   07/02/17 0929 Done   Family Acceptance E,TB VU   07/02/17 1600 Done               Point: Body mechanics (Done)    Learning Progress Summary    Learner Readiness Method Response Comment Documented by Status   Patient Acceptance EELLEN,NR  MS 07/03/17 1013 Done               Point: Precautions (Done)    Learning Progress Summary    Learner Readiness Method Response Comment Documented by Status   Patient Acceptance ED RENAN,NR  MS 07/03/17 1013 Done    Acceptance E,TB VU   07/02/17 1600 Done    Acceptance E VU  Educated on WBAT status CK 07/01/17 1533 Done   Family Acceptance E,TB VU  AC 07/02/17 1600 Done                      User Key     Initials Effective Dates Name Provider Type Discipline    CK 04/24/15 -  Jay Núñez, PT Physical Therapist PT    AC 06/16/16 -  Babs Jordan, RN Registered Nurse Nurse    MS 12/01/15 -  Efra Arroyo, PT Physical Therapist PT                    PT Recommendation and Plan  PT Frequency: daily  Plan of Care Review  Plan Of Care Reviewed With: patient  Progress: progress towards functional goals is fair  Outcome Summary/Follow up Plan: Improved tolerance to functional activity this day with an increase in gait distance.  Pt. does require mod. verbal/tactile cues for posture correction, RWX guidance, and for proper gait sequencing with use of RWX.  Pt. fatigues quickly with upright mobility.          Outcome Measures       07/03/17 1000 07/02/17 0900 07/01/17 1500    How much help from another person do you currently need...    Turning from your back to your side while in flat bed without using bedrails? 3  -MS 3  -CK 2  -CK    Moving from lying on back to sitting on the side of a flat bed without bedrails? 2  -MS 2  -CK 2  -CK    Moving to and from a bed to a chair (including a wheelchair)? 2  -MS 2  -CK 2  -CK    Standing up from a chair using your arms (e.g., wheelchair, bedside chair)? 2  -MS 2  -CK 2  -CK    Climbing 3-5 steps with a railing? 2  -MS 2  -CK 1  -CK    To walk in hospital room? 2  -MS 2  -CK 2  -CK    AM-PAC 6 Clicks Score 13  -MS 13  -CK 11  -CK    Functional Assessment    Outcome Measure Options AM-PAC 6 Clicks Basic Mobility (PT)  -MS AM-PAC 6 Clicks Basic Mobility (PT)  -CK AM-PAC 6 Clicks Basic Mobility (PT)  -CK      User Key  (r) = Recorded By, (t) = Taken By, (c) = Cosigned By    Initials Name Provider Type    CK Jay Núñez, PT Physical Therapist    MS Efra Arroyo, PT Physical Therapist           Time Calculation:         PT Charges        07/03/17 1014          Time Calculation    Start Time 0945  -MS      Stop Time 1010  -MS      Time Calculation (min) 25 min  -MS      PT Received On 07/03/17  -MS      PT - Next Appointment 07/04/17  -MS        User Key  (r) = Recorded By, (t) = Taken By, (c) = Cosigned By    Initials Name Provider Type    MS Efra Arroyo, PT Physical Therapist          Therapy Charges for Today     Code Description Service Date Service Provider Modifiers Qty    47131236937 HC PT THER PROC EA 15 MIN 7/3/2017 Efra Arroyo, PT GP 2    05042079518 HC PT THER SUPP EA 15 MIN 7/3/2017 Efra Arroyo, PT GP 1          PT G-Codes  Outcome Measure Options: AM-PAC 6 Clicks Basic Mobility (PT)    Efra Arroyo, PT  7/3/2017

## 2017-07-03 NOTE — PLAN OF CARE
Problem: Patient Care Overview (Adult)  Goal: Plan of Care Review  Outcome: Ongoing (interventions implemented as appropriate)    07/03/17 1953   Coping/Psychosocial Response Interventions   Plan Of Care Reviewed With patient   Patient Care Overview   Progress improving   Outcome Evaluation   Outcome Summary/Follow up Plan patient doing well. worked with PT. pain meds dosage decreased and controlling pain. CCP working on discharge dispostion. waffle cushion for scratches on patients bottom. educated on BP monitoring r/t HTN and taking BP meds.          Problem: Orthopaedic Fracture (Adult)  Goal: Signs and Symptoms of Listed Potential Problems Will be Absent or Manageable (Orthopaedic Fracture)  Outcome: Ongoing (interventions implemented as appropriate)    Problem: Fall Risk (Adult)  Goal: Absence of Falls  Outcome: Outcome(s) achieved Date Met:  07/03/17    Problem: Perioperative Period (Adult)  Goal: Signs and Symptoms of Listed Potential Problems Will be Absent or Manageable (Perioperative Period)  Outcome: Outcome(s) achieved Date Met:  07/03/17

## 2017-07-03 NOTE — PLAN OF CARE
Problem: Patient Care Overview (Adult)  Goal: Plan of Care Review  Outcome: Ongoing (interventions implemented as appropriate)    07/03/17 0423   Coping/Psychosocial Response Interventions   Plan Of Care Reviewed With patient   Patient Care Overview   Progress improving   Outcome Evaluation   Outcome Summary/Follow up Plan pt voices adequate pain control, ambulating at increased distances, dressing CDI, voiding without difficulty, educated on the importance of BP monitoring at home       Goal: Adult Individualization and Mutuality  Outcome: Ongoing (interventions implemented as appropriate)    07/03/17 0423   Individualization   Patient Specific Preferences none stated   Patient Specific Goals pain control and improved mobility   Patient Specific Interventions offer PRN pain meds in a timely manner       Goal: Discharge Needs Assessment  Outcome: Ongoing (interventions implemented as appropriate)    07/03/17 0423   Discharge Needs Assessment   Concerns To Be Addressed basic needs concerns   Readmission Within The Last 30 Days no previous admission in last 30 days   Equipment Needed After Discharge walker, standard;wound care supplies;raised toilet   Discharge Facility/Level Of Care Needs home with home health   Discharge Disposition still a patient   Current Health   Anticipated Changes Related to Illness inability to care for self   Self-Care   Equipment Currently Used at Home none   Living Environment   Transportation Available car;family or friend will provide         Problem: Orthopaedic Fracture (Adult)  Goal: Signs and Symptoms of Listed Potential Problems Will be Absent or Manageable (Orthopaedic Fracture)  Outcome: Ongoing (interventions implemented as appropriate)    07/03/17 0423   Orthopaedic Fracture   Problems Assessed (Orthopaedic Fracture) all   Problems Present (Orthopaedic Fracture) functional deficit/ self-care deficit;pain;situational response         Problem: Fall Risk (Adult)  Goal: Absence of  Falls  Outcome: Ongoing (interventions implemented as appropriate)    07/03/17 0423   Fall Risk (Adult)   Absence of Falls achieves outcome         Problem: Perioperative Period (Adult)  Goal: Signs and Symptoms of Listed Potential Problems Will be Absent or Manageable (Perioperative Period)  Outcome: Ongoing (interventions implemented as appropriate)    07/03/17 0423   Perioperative Period   Problems Assessed (Perioperative Period) all   Problems Present (Perioperative Period) pain;situational response

## 2017-07-03 NOTE — PROGRESS NOTES
Procedure(s):  RIGHT INTRAMEDULLARY NAILING/RODDING     LOS: 3 days     Subjective :   Complains of pain walking with PT in room.      Objective :    Vital signs in last 24 hours:  Vitals:    07/02/17 1939 07/02/17 2347 07/03/17 0322 07/03/17 0657   BP: 133/83 140/68 142/65 133/78   BP Location: Left arm Right arm Left arm Right arm   Patient Position: Lying Lying Lying Sitting   Pulse: 59 60 55 60   Resp: 18 18 18 18   Temp: 97.7 °F (36.5 °C) 98.3 °F (36.8 °C) 98.5 °F (36.9 °C) 97.3 °F (36.3 °C)   TempSrc: Oral Oral Oral Oral   SpO2: 90% 93% 94% 90%   Weight:       Height:           PHYSICAL EXAM:  Patient is calm, in no acute distress, awake and oriented x 3.  Dressing is clean, dry and intact.  No signs of infection.  Swelling is appropriate in amount.  Ecchymosis is appropriate in amount.  Homans test is negative.  Patient is neurovascularly intact distally.    LABS:    Results from last 7 days  Lab Units 07/02/17  0345 07/01/17  0324   WBC 10*3/mm3  --  11.17*   HEMOGLOBIN g/dL 14.1 15.5   HEMATOCRIT % 41.9 45.8   PLATELETS 10*3/mm3  --  134*       Results from last 7 days  Lab Units 07/01/17  0324   SODIUM mmol/L 139   POTASSIUM mmol/L 4.1   CHLORIDE mmol/L 102   CO2 mmol/L 22.0   BUN mg/dL 11   CREATININE mg/dL 0.96   GLUCOSE mg/dL 106*   CALCIUM mg/dL 8.9           ASSESSMENT:  Status post Procedure(s):  RIGHT INTRAMEDULLARY NAILING/RODDING      Plan:  Continue Physical Therapy, increase mobility and range of motion as tolerated.  Continue SCDs, Continue Lovenox for DVT prophylaxis x 14 days.  Dispo planning for rehab.  WBAT, ROM as tolerated.   Follow up in 2 weeks in office.    D/C staples POD #14.    Will sign off.  Call if questions    Manuelito Alba MD    Date: 7/3/2017  Time: 9:58 AM

## 2017-07-03 NOTE — PROGRESS NOTES
" LOS: 3 days     Name: Demetris Nye  Age: 71 y.o.  Sex: male  :  1945  MRN: [id number]         Primary Care Physician: Rian Morfin MD    Subjective   Subjective  Drowsy today.  Feels he is on too much pain medication.  Denies CP or SOA    Objective   Vital Signs  Temp:  [95 °F (35 °C)-98.5 °F (36.9 °C)] 97.1 °F (36.2 °C)  Heart Rate:  [48-80] 62  Resp:  [18] 18  BP: ()/(60-83) 95/60  Body mass index is 35.67 kg/(m^2).    Objective:  General Appearance:  Comfortable and in no acute distress.    Vital signs: (most recent): Blood pressure 95/60, pulse 62, temperature 97.1 °F (36.2 °C), temperature source Oral, resp. rate 18, height 72\" (182.9 cm), weight 263 lb (119 kg), SpO2 90 %.    Lungs:  Normal respiratory rate and normal effort.    Heart: Normal rate.  Regular rhythm.    Abdomen: Abdomen is soft.  Bowel sounds are normal.   There is no abdominal tenderness.     Extremities: There is no local swelling or dependent edema.    Neurological: Patient is oriented to person, place and time.  (Drowsy).    Skin:  Warm and dry.              Results Review:       I reviewed the patient's new clinical results.      Results from last 7 days  Lab Units 17  0345 17  0324 17  1735   WBC 10*3/mm3  --  11.17* 11.77*   HEMOGLOBIN g/dL 14.1 15.5 16.1   PLATELETS 10*3/mm3  --  134* 150       Results from last 7 days  Lab Units 17  0324 17  1735   SODIUM mmol/L 139 139   POTASSIUM mmol/L 4.1 4.3   CHLORIDE mmol/L 102 101   CO2 mmol/L 22.0 27.7   BUN mg/dL 11 12   CREATININE mg/dL 0.96 1.04   CALCIUM mg/dL 8.9 9.3   GLUCOSE mg/dL 106* 109*                 Scheduled Meds:     amLODIPine 10 mg Oral Daily   atorvastatin 10 mg Oral Daily   carvedilol 12.5 mg Oral Daily   enoxaparin 40 mg Subcutaneous Daily   losartan 100 mg Oral Daily   sennosides-docusate sodium 2 tablet Oral BID     PRN Meds:   •  acetaminophen  •  bisacodyl  •  diphenhydrAMINE  •  HYDROcodone-acetaminophen  •  " melatonin  •  ondansetron **OR** ondansetron ODT **OR** ondansetron  •  pneumococcal polysaccharide 23-valent  •  sodium chloride  Continuous Infusions:       Assessment/Plan   Principal Problem:    Closed right hip fracture  Active Problems:    Fall    HTN (hypertension)    PVC (premature ventricular contraction)      Assessment & Plan    - He is too drowsy today from the pain meds.  Will d/c oxycodone and change to lower dose lortab as well as stretch out the frequency  - Blood pressure on the low side upon last check.  Hold parameters added to his BP meds and pain meds have been reduced.  - Check hgb now given low BP and drowsiness  - CCP working on rehab placement  - PT following    Demetris Mccoy MD  [facility]  07/03/17  12:40 PM

## 2017-07-03 NOTE — PLAN OF CARE
Problem: Patient Care Overview (Adult)  Goal: Plan of Care Review    07/03/17 1013   Coping/Psychosocial Response Interventions   Plan Of Care Reviewed With patient   Patient Care Overview   Progress progress towards functional goals is fair   Outcome Evaluation   Outcome Summary/Follow up Plan Improved tolerance to functional activity this day with an increase in gait distance. Pt. does require mod. verbal/tactile cues for posture correction, RWX guidance, and for proper gait sequencing with use of RWX. Pt. fatigues quickly with upright mobility.

## 2017-07-04 PROCEDURE — 94799 UNLISTED PULMONARY SVC/PX: CPT

## 2017-07-04 PROCEDURE — 97110 THERAPEUTIC EXERCISES: CPT | Performed by: PHYSICAL THERAPIST

## 2017-07-04 PROCEDURE — 25010000002 ENOXAPARIN PER 10 MG: Performed by: ORTHOPAEDIC SURGERY

## 2017-07-04 RX ADMIN — SENNA AND DOCUSATE SODIUM 2 TABLET: 50; 8.6 TABLET, FILM COATED ORAL at 20:44

## 2017-07-04 RX ADMIN — LOSARTAN POTASSIUM 100 MG: 50 TABLET, FILM COATED ORAL at 09:44

## 2017-07-04 RX ADMIN — SENNA AND DOCUSATE SODIUM 2 TABLET: 50; 8.6 TABLET, FILM COATED ORAL at 09:44

## 2017-07-04 RX ADMIN — AMLODIPINE BESYLATE 10 MG: 10 TABLET ORAL at 09:44

## 2017-07-04 RX ADMIN — HYDROCODONE BITARTRATE AND ACETAMINOPHEN 1 TABLET: 7.5; 325 TABLET ORAL at 06:53

## 2017-07-04 RX ADMIN — ATORVASTATIN CALCIUM 10 MG: 10 TABLET, FILM COATED ORAL at 09:44

## 2017-07-04 RX ADMIN — CARVEDILOL 12.5 MG: 12.5 TABLET, FILM COATED ORAL at 09:44

## 2017-07-04 RX ADMIN — ENOXAPARIN SODIUM 40 MG: 40 INJECTION SUBCUTANEOUS at 09:44

## 2017-07-04 RX ADMIN — HYDROCODONE BITARTRATE AND ACETAMINOPHEN 1 TABLET: 7.5; 325 TABLET ORAL at 00:33

## 2017-07-04 NOTE — PROGRESS NOTES
" LOS: 4 days     Name: Demetris Nye  Age: 71 y.o.  Sex: male  :  1945  MRN: [id number]         Primary Care Physician: Rian Morfin MD    Subjective   Subjective  No complaints.  More awake and alert today    Objective   Vital Signs  Temp:  [97 °F (36.1 °C)-98.1 °F (36.7 °C)] 97.3 °F (36.3 °C)  Heart Rate:  [54-82] 71  Resp:  [16-18] 18  BP: (125-179)/(80-89) 125/80  Body mass index is 35.67 kg/(m^2).    Objective:  General Appearance:  Comfortable and in no acute distress.    Vital signs: (most recent): Blood pressure 125/80, pulse 71, temperature 97.3 °F (36.3 °C), temperature source Oral, resp. rate 18, height 72\" (182.9 cm), weight 263 lb (119 kg), SpO2 97 %.    Lungs:  Normal respiratory rate and normal effort.    Heart: Normal rate.  Regular rhythm.    Abdomen: Abdomen is soft.  Bowel sounds are normal.   There is no abdominal tenderness.     Extremities: There is no local swelling or dependent edema.    Neurological: Patient is alert and oriented to person, place and time.    Skin:  Warm and dry.              Results Review:       I reviewed the patient's new clinical results.      Results from last 7 days  Lab Units 17  1259 17  0345 17  0324 17  1735   WBC 10*3/mm3  --   --  11.17* 11.77*   HEMOGLOBIN g/dL 14.1 14.1 15.5 16.1   PLATELETS 10*3/mm3  --   --  134* 150       Results from last 7 days  Lab Units 17  0324 17  1735   SODIUM mmol/L 139 139   POTASSIUM mmol/L 4.1 4.3   CHLORIDE mmol/L 102 101   CO2 mmol/L 22.0 27.7   BUN mg/dL 11 12   CREATININE mg/dL 0.96 1.04   CALCIUM mg/dL 8.9 9.3   GLUCOSE mg/dL 106* 109*                 Scheduled Meds:     amLODIPine 10 mg Oral Daily   atorvastatin 10 mg Oral Daily   carvedilol 12.5 mg Oral Daily   enoxaparin 40 mg Subcutaneous Daily   losartan 100 mg Oral Daily   sennosides-docusate sodium 2 tablet Oral BID     PRN Meds:   •  acetaminophen  •  bisacodyl  •  diphenhydrAMINE  •  " HYDROcodone-acetaminophen  •  melatonin  •  ondansetron **OR** ondansetron ODT **OR** ondansetron  •  pneumococcal polysaccharide 23-valent  •  sodium chloride  Continuous Infusions:       Assessment/Plan   Principal Problem:    Closed right hip fracture  Active Problems:    Fall    HTN (hypertension)    PVC (premature ventricular contraction)      Assessment & Plan    - Drowsiness resolved with reduction of pain medication.  - Blood pressure stable  - Hgb stable  - CCP working on rehab placement  - Lovenox for DVT prophylaxis    Demetris Mccoy MD  [facility]  07/04/17  3:21 PM

## 2017-07-04 NOTE — PLAN OF CARE
Problem: Patient Care Overview (Adult)  Goal: Plan of Care Review    07/04/17 1001   Coping/Psychosocial Response Interventions   Plan Of Care Reviewed With patient;spouse   Patient Care Overview   Progress improving   Outcome Evaluation   Outcome Summary/Follow up Plan Requires increased time to complete short distances of ambulation. Verbal cueing for step length and posture during ambulation as he tends to push walker too far forward and lean with his BLE way behind him (increased fall risk). He responds well to cueing but reinforcement needed.

## 2017-07-04 NOTE — THERAPY TREATMENT NOTE
Acute Care - Physical Therapy Treatment Note  [facility]     Patient Name: Demetris Nye  : 1945  MRN: [id number]  Today's Date: 2017             Admit Date: 2017    Visit Dx:    ICD-10-CM ICD-9-CM   1. Fall, initial encounter W19.XXXA E888.9   2. Closed right hip fracture, initial encounter S72.001A 820.8   3. Impaired mobility Z74.09 799.89     Patient Active Problem List   Diagnosis   • Fall   • Closed right hip fracture   • HTN (hypertension)   • PVC (premature ventricular contraction)               Adult Rehabilitation Note       17 0900 17 1009 17 0900    Rehab Assessment/Intervention    Discipline physical therapist  -CK physical therapist  -MS physical therapist  -CK    Document Type therapy note (daily note)  -CK therapy note (daily note)  -MS therapy note (daily note)  -CK    Subjective Information agree to therapy;complains of;weakness;pain  -CK agree to therapy;complains of;weakness;fatigue;pain  -MS agree to therapy;complains of;pain  -CK    Patient Effort, Rehab Treatment good  -CK adequate  -MS good  -CK    Symptoms Noted Comment  Pt. reports pain in his Right L.E. as well as overall fatigue/weakness this AM.  Pt. fatigues quickly with upright mobility.  -MS     Precautions/Limitations fall precautions;hip precautions- right  -CK fall precautions  -MS fall precautions;hip precautions- right  -CK    Recorded by [CK] Jay Núñez, PT [MS] Efra Arroyo, PT [CK] Jay Núñez, PT    Vital Signs    Pre SpO2 (%) 95  -CK      O2 Delivery Pre Treatment supplemental O2   2L  -CK  room air  -CK    Pre Patient Position Sitting  -CK      Recorded by [CK] Jay Núñez, PT  [CK] Jay Núñez, PT    Pain Assessment    Pain Assessment 0-10  -CK 0-10  -MS 0-10  -CK    Pain Score 5  -CK 5  -MS 4  -CK    Post Pain Score 5  -CK 5  -MS 4  -CK    Pain Type Acute pain;Surgical pain  -CK Acute pain;Surgical pain  -MS Acute pain;Surgical pain  -CK    Pain Location Leg  -CK Leg   -MS Hip  -CK    Pain Orientation Right  -CK Right  -MS Right  -CK    Pain Intervention(s) Medication (See MAR);Cold applied;Repositioned  -CK Medication (See MAR);Cold applied;Repositioned;Elevated;Rest  -MS Medication (See MAR);Repositioned  -CK    Recorded by [CK] Jay Núeñz, PT [MS] Efra Arroyo, PT [CK] Jay Núñez, PT    Cognitive Assessment/Intervention    Orientation Status  oriented to;person;place  -MS     Follows Commands/Answers Questions  75% of the time;able to follow single-step instructions;needs cueing;needs increased time;needs repetition  -MS     Personal Safety Interventions  fall prevention program maintained;gait belt;nonskid shoes/slippers when out of bed;supervised activity  -MS     Recorded by  [MS] Efra Arroyo PT     Bed Mobility, Assessment/Treatment    Bed Mob, Supine to Sit, [place]   minimum assist (75% patient effort);2 person assist required;verbal cues required  -CK    Bed Mob, Sit to Supine, [place]   minimum assist (75% patient effort);2 person assist required;verbal cues required  -CK    Bed Mobility, Comment up in chair  -CK Pt. up in chair this AM.  -MS     Recorded by [CK] Jay Núñez PT [MS] Efra Arroyo, PT [CK] Jay Núñez, PT    Transfer Assessment/Treatment    Transfers, Sit-Stand [place] minimum assist (75% patient effort);2 person assist required;verbal cues required  -CK moderate assist (50% patient effort);2 person assist required  -MS verbal cues required;moderate assist (50% patient effort)  -CK    Transfers, Stand-Sit [place] minimum assist (75% patient effort);verbal cues required;2 person assist required  -CK moderate assist (50% patient effort);2 person assist required  -MS moderate assist (50% patient effort);verbal cues required  -CK    Transfers, Sit-Stand-Sit, Assist Device rolling walker  -CK rolling walker  -MS rolling walker  -CK    Toilet Transfer, [place] minimum assist (75% patient effort);2 person assist  required;verbal cues required  -CK      Toilet Transfer, Assistive Device rolling walker  -CK      Recorded by [CK] Jay Núñez PT [MS] Efra Arroyo PT [CK] Jay Núñez PT    Gait Assessment/Treatment    Gait, [place] Level minimum assist (75% patient effort);2 person assist required;verbal cues required;nonverbal cues required (demo/gesture)  -CK minimum assist (75% patient effort);2 person assist required  -MS minimum assist (75% patient effort);2 person assist required;verbal cues required  -CK    Gait, Assistive Device rolling walker  -CK rolling walker  -MS rolling walker  -CK    Gait, Distance (Feet) 40  -CK 35  -MS 12  -CK    Gait, Gait Deviations tamara decreased;decreased heel strike;step length decreased;forward flexed posture;weight-shifting ability decreased  -CK right:;antalgic;tamara decreased;decreased heel strike;forward flexed posture;step length decreased  -MS tamara decreased;decreased heel strike;forward flexed posture;step length decreased  -CK    Gait, Safety Issues  balance decreased during turns;step length decreased  -MS     Gait, Comment Requires verbal cueing for all mobility  -CK Pt. requires mod. verbal/tactile cues for posture correction, RWX guidance, and proper gait sequencing.  -MS     Recorded by [CK] Jay Núñez PT [MS] Efra Arroyo PT [CK] Jay Núñez PT    Therapy Exercises    Bilateral Lower Extremities AROM:;10 reps;sitting;ankle pumps/circles;glut sets;hip abduction/adduction;LAQ;quad sets  -CK  AROM:;15 reps;sitting;glut sets;ankle pumps/circles;hip abduction/adduction;LAQ;quad sets  -CK    Exercise Protocols  hip ORIF  -MS     Hip ORIF Exercises  right:;15 reps;completed protocol  -MS     Recorded by [CK] Jay Núñez PT [MS] Efra Arroyo PT [CK] Jay Núñez PT    Positioning and Restraints    Pre-Treatment Position sitting in chair/recliner  -CK sitting in chair/recliner  -MS in bed  -CK    Post Treatment Position chair  -CK chair   -MS chair  -CK    In Chair reclined;sitting;notified nsg;with family/caregiver  -CK notified nsg;reclined;sitting;call light within reach;encouraged to call for assist;exit alarm on   Ice pack to Right hip.   -MS reclined;sitting;call light within reach;with family/caregiver  -CK    Recorded by [CK] Jay Núñez, PT [MS] Efra Arroyo, PT [CK] Jay Núñez, PT      User Key  (r) = Recorded By, (t) = Taken By, (c) = Cosigned By    Initials Name Effective Dates    CK Jay Núñez, PT 04/24/15 -     MS Efra Arroyo, PT 12/01/15 -                 IP PT Goals       07/01/17 1534          Bed Mobility PT LTG    Bed Mobility PT LTG, Date Established 07/01/17  -CK      Bed Mobility PT LTG, Time to Achieve 5 days  -CK      Bed Mobility PT LTG, Activity Type all bed mobility  -CK      Bed Mobility PT LTG, [place] Level minimum assist (75% patient effort)  -CK      Bed Mobility PT Goal  LTG, Assist Device bed rails  -CK      Transfer Training PT LTG    Transfer Training PT LTG, Date Established 07/01/17  -CK      Transfer Training PT LTG, Time to Achieve 5 days  -CK      Transfer Training PT LTG, Activity Type bed to chair /chair to bed  -CK      Transfer Training PT LTG, [place] Level minimum assist (75% patient effort)  -CK      Transfer Training PT LTG, Assist Device walker, rolling  -CK      Transfer Training 2 PT LTG    Transfer Training PT 2 LTG, Date Established 07/01/17  -CK      Transfer Training PT 2 LTG, Activity Type sit to stand/stand to sit  -CK      Transfer Training PT 2 LTG, [place] Level contact guard assist  -CK      Transfer Training PT 2 LTG, Assist Device walker, rolling  -CK      Gait Training PT LTG    Gait Training Goal PT LTG, Date Established 07/01/17  -CK      Gait Training Goal PT LTG, Time to Achieve 5 days  -CK      Gait Training Goal PT LTG, [place] Level minimum assist (75% patient effort)  -CK      Gait Training Goal PT LTG, Assist Device walker, rolling  -CK       Gait Training Goal PT LTG, Distance to Achieve 100 feet  -        User Key  (r) = Recorded By, (t) = Taken By, (c) = Cosigned By    Initials Name Provider Type    CK Jay Núñez, PT Physical Therapist          Physical Therapy Education     Title: PT OT SLP Therapies (Done)     Topic: Physical Therapy (Done)     Point: Mobility training (Done)    Learning Progress Summary    Learner Readiness Method Response Comment Documented by Status   Patient Acceptance E VU   07/04/17 1000 Done    Acceptance E,D VU,NR  MS 07/03/17 1013 Done    Acceptance E,TB VU   07/02/17 1600 Done    Acceptance E VU Educated on WBAT status  07/01/17 1533 Done   Family Acceptance E,TB VU   07/02/17 1600 Done               Point: Home exercise program (Done)    Learning Progress Summary    Learner Readiness Method Response Comment Documented by Status   Patient Acceptance E,D VU,NR  MS 07/03/17 1013 Done    Acceptance E,TB VU   07/02/17 1600 Done    Acceptance E VU   07/02/17 0929 Done   Family Acceptance E,TB VU   07/02/17 1600 Done               Point: Body mechanics (Done)    Learning Progress Summary    Learner Readiness Method Response Comment Documented by Status   Patient Acceptance E,D VU,NR  MS 07/03/17 1013 Done               Point: Precautions (Done)    Learning Progress Summary    Learner Readiness Method Response Comment Documented by Status   Patient Acceptance E,D VU,NR  MS 07/03/17 1013 Done    Acceptance E,TB VU   07/02/17 1600 Done    Acceptance E VU Educated on WBAT status  07/01/17 1533 Done   Family Acceptance E,TB VU   07/02/17 1600 Done                      User Key     Initials Effective Dates Name Provider Type Discipline     04/24/15 -  Jay Núñez, PT Physical Therapist PT     06/16/16 -  Babs Jordan, RN Registered Nurse Nurse    MS 12/01/15 -  Efra Arroyo PT Physical Therapist PT                    PT Recommendation and Plan  PT Frequency: daily  Plan of Care Review  Plan Of  Care Reviewed With: patient, spouse  Progress: improving  Outcome Summary/Follow up Plan: Requires increased time to complete short distances of ambulation. Verbal cueing for step length and posture during ambulation as he tends to push walker too far forward and lean with his BLE way behind him (increased fall risk). He responds well to cueing but reinforcement needed.           Outcome Measures       07/04/17 1000 07/03/17 1000 07/02/17 0900    How much help from another person do you currently need...    Turning from your back to your side while in flat bed without using bedrails? 3  -CK 3  -MS 3  -CK    Moving from lying on back to sitting on the side of a flat bed without bedrails? 3  -CK 2  -MS 2  -CK    Moving to and from a bed to a chair (including a wheelchair)? 3  -CK 2  -MS 2  -CK    Standing up from a chair using your arms (e.g., wheelchair, bedside chair)? 3  -CK 2  -MS 2  -CK    Climbing 3-5 steps with a railing? 2  -CK 2  -MS 2  -CK    To walk in hospital room? 2  -CK 2  -MS 2  -CK    AM-PAC 6 Clicks Score 16  -CK 13  -MS 13  -CK    Functional Assessment    Outcome Measure Options AM-PAC 6 Clicks Basic Mobility (PT)  -CK AM-PAC 6 Clicks Basic Mobility (PT)  -MS AM-PAC 6 Clicks Basic Mobility (PT)  -CK      07/01/17 1500          How much help from another person do you currently need...    Turning from your back to your side while in flat bed without using bedrails? 2  -CK      Moving from lying on back to sitting on the side of a flat bed without bedrails? 2  -CK      Moving to and from a bed to a chair (including a wheelchair)? 2  -CK      Standing up from a chair using your arms (e.g., wheelchair, bedside chair)? 2  -CK      Climbing 3-5 steps with a railing? 1  -CK      To walk in hospital room? 2  -CK      AM-PAC 6 Clicks Score 11  -CK      Functional Assessment    Outcome Measure Options AM-PAC 6 Clicks Basic Mobility (PT)  -CK        User Key  (r) = Recorded By, (t) = Taken By, (c) = Cosigned  By    Initials Name Provider Type    MARILOU Núñez PT Physical Therapist    MS Efra Arroyo, PT Physical Therapist           Time Calculation:         PT Charges       07/04/17 1003          Time Calculation    Start Time 0915  -CK      Stop Time 0950  -CK      Time Calculation (min) 35 min  -CK      PT Received On 07/04/17  -CK      PT - Next Appointment 07/05/17  -CK      PT Goal Re-Cert Due Date 07/07/17  -CK        User Key  (r) = Recorded By, (t) = Taken By, (c) = Cosigned By    Initials Name Provider Type    MARILOU Núñez PT Physical Therapist          Therapy Charges for Today     Code Description Service Date Service Provider Modifiers Qty    72666018019 HC PT THER PROC EA 15 MIN 7/4/2017 Jay Núñez PT GP 2          PT G-Codes  Outcome Measure Options: AM-PAC 6 Clicks Basic Mobility (PT)    Jay Núñez PT  7/4/2017

## 2017-07-04 NOTE — PLAN OF CARE
Problem: Patient Care Overview (Adult)  Goal: Plan of Care Review  Outcome: Ongoing (interventions implemented as appropriate)    07/04/17 2843   Coping/Psychosocial Response Interventions   Plan Of Care Reviewed With patient   Patient Care Overview   Progress improving   Outcome Evaluation   Outcome Summary/Follow up Plan VSS. little to no pain this shift, controlled with scheduled PO Tylenol. Worked with PT and took some steps. dressing changed this am, moist drainage on dressing at that time. patient still being bladder scanned q8hrs and straight cath'd when >400. Patient was able to void 100 on his own at the end of the shift and did not require a straight cath this shift as his bladder scan was in the low 200s. Patient educated to monitor BP when at home and about his routine breathing treatments with COPD.          Problem: Orthopaedic Fracture (Adult)  Goal: Signs and Symptoms of Listed Potential Problems Will be Absent or Manageable (Orthopaedic Fracture)  Outcome: Ongoing (interventions implemented as appropriate)    Problem: Fall Risk (Adult)  Goal: Absence of Falls  Outcome: Ongoing (interventions implemented as appropriate)

## 2017-07-05 PROCEDURE — 25010000002 ENOXAPARIN PER 10 MG: Performed by: ORTHOPAEDIC SURGERY

## 2017-07-05 PROCEDURE — 94799 UNLISTED PULMONARY SVC/PX: CPT

## 2017-07-05 PROCEDURE — 97110 THERAPEUTIC EXERCISES: CPT

## 2017-07-05 RX ORDER — HYDROCODONE BITARTRATE AND ACETAMINOPHEN 7.5; 325 MG/1; MG/1
1 TABLET ORAL EVERY 4 HOURS PRN
Status: DISCONTINUED | OUTPATIENT
Start: 2017-07-05 | End: 2017-07-08

## 2017-07-05 RX ADMIN — SENNA AND DOCUSATE SODIUM 2 TABLET: 50; 8.6 TABLET, FILM COATED ORAL at 17:27

## 2017-07-05 RX ADMIN — CARVEDILOL 12.5 MG: 12.5 TABLET, FILM COATED ORAL at 07:52

## 2017-07-05 RX ADMIN — ATORVASTATIN CALCIUM 10 MG: 10 TABLET, FILM COATED ORAL at 07:52

## 2017-07-05 RX ADMIN — HYDROCODONE BITARTRATE AND ACETAMINOPHEN 1 TABLET: 7.5; 325 TABLET ORAL at 01:16

## 2017-07-05 RX ADMIN — HYDROCODONE BITARTRATE AND ACETAMINOPHEN 1 TABLET: 7.5; 325 TABLET ORAL at 05:42

## 2017-07-05 RX ADMIN — HYDROCODONE BITARTRATE AND ACETAMINOPHEN 1 TABLET: 7.5; 325 TABLET ORAL at 10:55

## 2017-07-05 RX ADMIN — AMLODIPINE BESYLATE 10 MG: 10 TABLET ORAL at 07:52

## 2017-07-05 RX ADMIN — LOSARTAN POTASSIUM 100 MG: 50 TABLET, FILM COATED ORAL at 07:52

## 2017-07-05 RX ADMIN — HYDROCODONE BITARTRATE AND ACETAMINOPHEN 1 TABLET: 7.5; 325 TABLET ORAL at 19:57

## 2017-07-05 RX ADMIN — ENOXAPARIN SODIUM 40 MG: 40 INJECTION SUBCUTANEOUS at 07:53

## 2017-07-05 RX ADMIN — SENNA AND DOCUSATE SODIUM 2 TABLET: 50; 8.6 TABLET, FILM COATED ORAL at 07:52

## 2017-07-05 NOTE — THERAPY TREATMENT NOTE
Acute Care - Physical Therapy Treatment Note  [facility]     Patient Name: Demetris Nye  : 1945  MRN: [id number]  Today's Date: 2017             Admit Date: 2017    Visit Dx:    ICD-10-CM ICD-9-CM   1. Fall, initial encounter W19.XXXA E888.9   2. Closed right hip fracture, initial encounter S72.001A 820.8   3. Impaired mobility Z74.09 799.89     Patient Active Problem List   Diagnosis   • Fall   • Closed right hip fracture   • HTN (hypertension)   • PVC (premature ventricular contraction)               Adult Rehabilitation Note       17 1100 17 0900 17 1009    Rehab Assessment/Intervention    Discipline physical therapy assistant  -RW physical therapist  -CK physical therapist  -MS    Document Type therapy note (daily note)  -RW therapy note (daily note)  -CK therapy note (daily note)  -MS    Subjective Information agree to therapy;complains of;pain  -RW agree to therapy;complains of;weakness;pain  -CK agree to therapy;complains of;weakness;fatigue;pain  -MS    Patient Effort, Rehab Treatment good  -RW good  -CK adequate  -MS    Symptoms Noted Comment   Pt. reports pain in his Right L.E. as well as overall fatigue/weakness this AM.  Pt. fatigues quickly with upright mobility.  -MS    Precautions/Limitations fall precautions;hip precautions- right  -RW fall precautions;hip precautions- right  -CK fall precautions  -MS    Recorded by [RW] Amanda Ospina PTA [CK] Jay Núñez, PT [MS] Efra Arroyo, PT    Vital Signs    Pre SpO2 (%) 93  -RW 95  -CK     O2 Delivery Pre Treatment room air  -RW supplemental O2   2L  -CK     Pre Patient Position  Sitting  -CK     Recorded by [RW] Amanda Ospina PTA [CK] Jay Núñez, PT     Pain Assessment    Pain Assessment 0-10  -RW 0-10  -CK 0-10  -MS    Pain Score 6  -RW 5  -CK 5  -MS    Post Pain Score  5  -CK 5  -MS    Pain Type Acute pain  -RW Acute pain;Surgical pain  -CK Acute pain;Surgical pain  -MS    Pain Location Foot  -RW  Leg  -CK Leg  -MS    Pain Orientation Right  -RW Right  -CK Right  -MS    Pain Intervention(s) Medication (See MAR);Repositioned;Ambulation/increased activity  -RW Medication (See MAR);Cold applied;Repositioned  -CK Medication (See MAR);Cold applied;Repositioned;Elevated;Rest  -MS    Response to Interventions tolerated  -RW      Multiple Pain Sites Yes  -RW      Recorded by [RW] Amanda Ospina PTA [CK] Jay Núñez PT [MS] Efra Arroyo, PT    Pain 2    Pain Score 2 6  -RW      Pain Type 2 Acute pain;Surgical pain  -RW      Pain Location 2 Groin  -RW      Pain Orientation 2 Right  -RW      Pain Intervention(s) 2 Medication (See MAR);Cold applied;Repositioned;Ambulation/increased activity  -RW      Response to Interventions 2 tolerated, unchanged  -RW      Recorded by [RW] Amanda Ospina PTA      Cognitive Assessment/Intervention    Current Cognitive/Communication Assessment functional  -RW      Orientation Status oriented x 4  -RW  oriented to;person;place  -MS    Follows Commands/Answers Questions 75% of the time;able to follow single-step instructions;needs cueing  -RW  75% of the time;able to follow single-step instructions;needs cueing;needs increased time;needs repetition  -MS    Personal Safety mild impairment;at risk behaviors demonstrated;decreased awareness, need for assist;decreased awareness, need for safety;decreased insight to deficits  -RW      Personal Safety Interventions fall prevention program maintained;gait belt;nonskid shoes/slippers when out of bed  -RW  fall prevention program maintained;gait belt;nonskid shoes/slippers when out of bed;supervised activity  -MS    Recorded by [RW] Amanda Ospina PTA  [MS] Efra Arroyo, PT    Bed Mobility, Assessment/Treatment    Bed Mob, Supine to Sit, [place] not tested  -RW      Bed Mob, Sit to Supine, [place] not tested  -RW      Bed Mobility, Comment Pt up in chair  -RW up in chair  -CK Pt. up in chair this AM.  -MS    Recorded by  [RW] Amanda Ospina, PTA [CK] Jay Núñez, PT [MS] Efra Arroyo, PT    Transfer Assessment/Treatment    Transfers, Sit-Stand [place] moderate assist (50% patient effort);verbal cues required;nonverbal cues required (demo/gesture)  -RW minimum assist (75% patient effort);2 person assist required;verbal cues required  -CK moderate assist (50% patient effort);2 person assist required  -MS    Transfers, Stand-Sit [place] moderate assist (50% patient effort);verbal cues required;nonverbal cues required (demo/gesture)  -RW minimum assist (75% patient effort);verbal cues required;2 person assist required  -CK moderate assist (50% patient effort);2 person assist required  -MS    Transfers, Sit-Stand-Sit, Assist Device rolling walker  -RW rolling walker  -CK rolling walker  -MS    Toilet Transfer, [place]  minimum assist (75% patient effort);2 person assist required;verbal cues required  -CK     Toilet Transfer, Assistive Device  rolling walker  -CK     Transfer, Comment Very slow to come to stand. Verbal cueing required for hand placement and RLE placement  -RW      Recorded by [RW] Amanda Ospina PTA [CK] Jay Núñez, PT [MS] Efra Arroyo, PT    Gait Assessment/Treatment    Gait, [place] Level minimum assist (75% patient effort);verbal cues required  -RW minimum assist (75% patient effort);2 person assist required;verbal cues required;nonverbal cues required (demo/gesture)  -CK minimum assist (75% patient effort);2 person assist required  -MS    Gait, Assistive Device rolling walker  -RW rolling walker  -CK rolling walker  -MS    Gait, Distance (Feet) 30  -RW 40  -CK 35  -MS    Gait, Gait Deviations forward flexed posture;bilateral:;[place] decreased;right:;step length decreased;toe-to-floor clearance decreased;weight-shifting ability decreased  -RW tamara decreased;decreased heel strike;step length decreased;forward flexed posture;weight-shifting ability decreased  -CK  right:;antalgic;tamara decreased;decreased heel strike;forward flexed posture;step length decreased  -MS    Gait, Safety Issues sequencing ability decreased;step length decreased;weight-shifting ability decreased  -RW  balance decreased during turns;step length decreased  -MS    Gait, Impairments strength decreased;impaired balance  -RW      Gait, Comment Verbal cueing required for correct sequencing w/ gait. Pt required several standing rest breaks due to fatigue and pain. Further distance limited due to fatigue and pain  -RW Requires verbal cueing for all mobility  -CK Pt. requires mod. verbal/tactile cues for posture correction, RWX guidance, and proper gait sequencing.  -MS    Recorded by [RW] Amanda Ospina PTA [CK] Jay Núñez, PT [MS] Efra Arroyo PT    Therapy Exercises    Bilateral Lower Extremities  AROM:;10 reps;sitting;ankle pumps/circles;glut sets;hip abduction/adduction;LAQ;quad sets  -CK     Exercise Protocols hip ORIF  -RW  hip ORIF  -MS    Hip ORIF Exercises right:;15 reps;heel slides;hip abduction;LAQ  -RW  right:;15 reps;completed protocol  -MS    Recorded by [RW] Amanda Ospina PTA [CK] Jay Núñez, PT [MS] Efra Arroyo PT    Positioning and Restraints    Pre-Treatment Position sitting in chair/recliner  -RW sitting in chair/recliner  -CK sitting in chair/recliner  -MS    Post Treatment Position chair  -RW chair  -CK chair  -MS    In Chair reclined;call light within reach;encouraged to call for assist  -RW reclined;sitting;notified nsg;with family/caregiver  -CK notified nsg;reclined;sitting;call light within reach;encouraged to call for assist;exit alarm on   Ice pack to Right hip.   -MS    Recorded by [RW] Amanda Ospian PTA [CK] Jay Núñez, PT [MS] Efra Arroyo PT      User Key  (r) = Recorded By, (t) = Taken By, (c) = Cosigned By    Initials Name Effective Dates    CK Jay Núñez, PT 04/24/15 -     MS Efra Arroyo, PT 12/01/15 -     RW Amanda Ospina PTA  04/06/16 -                 IP PT Goals       07/01/17 1534          Bed Mobility PT LTG    Bed Mobility PT LTG, Date Established 07/01/17  -CK      Bed Mobility PT LTG, Time to Achieve 5 days  -CK      Bed Mobility PT LTG, Activity Type all bed mobility  -CK      Bed Mobility PT LTG, [place] Level minimum assist (75% patient effort)  -CK      Bed Mobility PT Goal  LTG, Assist Device bed rails  -CK      Transfer Training PT LTG    Transfer Training PT LTG, Date Established 07/01/17  -CK      Transfer Training PT LTG, Time to Achieve 5 days  -CK      Transfer Training PT LTG, Activity Type bed to chair /chair to bed  -CK      Transfer Training PT LTG, [place] Level minimum assist (75% patient effort)  -CK      Transfer Training PT LTG, Assist Device walker, rolling  -CK      Transfer Training 2 PT LTG    Transfer Training PT 2 LTG, Date Established 07/01/17  -CK      Transfer Training PT 2 LTG, Activity Type sit to stand/stand to sit  -CK      Transfer Training PT 2 LTG, [place] Level contact guard assist  -CK      Transfer Training PT 2 LTG, Assist Device walker, rolling  -CK      Gait Training PT LTG    Gait Training Goal PT LTG, Date Established 07/01/17  -CK      Gait Training Goal PT LTG, Time to Achieve 5 days  -CK      Gait Training Goal PT LTG, [place] Level minimum assist (75% patient effort)  -CK      Gait Training Goal PT LTG, Assist Device walker, rolling  -CK      Gait Training Goal PT LTG, Distance to Achieve 100 feet  -CK        User Key  (r) = Recorded By, (t) = Taken By, (c) = Cosigned By    Initials Name Provider Type    MARILOU Núñez PT Physical Therapist          Physical Therapy Education     Title: PT OT SLP Therapies (Done)     Topic: Physical Therapy (Done)     Point: Mobility training (Done)    Learning Progress Summary    Learner Readiness Method Response Comment Documented by Status   Patient Acceptance E,TB,D RENAN,NR  RW 07/05/17 1141 Done    Acceptance JESSICA ZAMARRIPA  07/04/17 1000 Done    Acceptance E,D VU,NR  MS 07/03/17 1013 Done    Acceptance E,TB VU   07/02/17 1600 Done    Acceptance E VU Educated on WBAT status  07/01/17 1533 Done   Family Acceptance E,TB VU   07/02/17 1600 Done               Point: Home exercise program (Done)    Learning Progress Summary    Learner Readiness Method Response Comment Documented by Status   Patient Acceptance E,TB,D VU,NR   07/05/17 1141 Done    Acceptance E,D VU,NR  MS 07/03/17 1013 Done    Acceptance E,TB VU   07/02/17 1600 Done    Acceptance E VU  CK 07/02/17 0929 Done   Family Acceptance E,TB VU   07/02/17 1600 Done               Point: Body mechanics (Done)    Learning Progress Summary    Learner Readiness Method Response Comment Documented by Status   Patient Acceptance E,TB,D VU,NR   07/05/17 1141 Done    Acceptance E,D VU,NR  MS 07/03/17 1013 Done               Point: Precautions (Done)    Learning Progress Summary    Learner Readiness Method Response Comment Documented by Status   Patient Acceptance E,TB,D VU,NR   07/05/17 1141 Done    Acceptance E,D VU,NR  MS 07/03/17 1013 Done    Acceptance E,TB VU   07/02/17 1600 Done    Acceptance E VU Educated on WBAT status  07/01/17 1533 Done   Family Acceptance E,TB VU   07/02/17 1600 Done                      User Key     Initials Effective Dates Name Provider Type Discipline     04/24/15 -  Jay Núñez, PT Physical Therapist PT     06/16/16 -  Babs Jordan, RN Registered Nurse Nurse    MS 12/01/15 -  Efra Arroyo, PT Physical Therapist PT     04/06/16 -  Amanda Ospina PTA Physical Therapy Assistant PT                    PT Recommendation and Plan  Anticipated Discharge Disposition: skilled nursing facility  PT Frequency: daily  Plan of Care Review  Plan Of Care Reviewed With: patient  Outcome Summary/Follow up Plan: Pt ambulation distance limited due to pain and fatigue this am. Verbal cueing required for safety and correct sequencing during  ambulation. Pt required several standing rest breaks due to fatigue and pain as well.  Would benefit from SNF upon d/c.           Outcome Measures       07/05/17 1100 07/04/17 1000 07/03/17 1000    How much help from another person do you currently need...    Turning from your back to your side while in flat bed without using bedrails? 3  -RW 3  -CK 3  -MS    Moving from lying on back to sitting on the side of a flat bed without bedrails? 3  -RW 3  -CK 2  -MS    Moving to and from a bed to a chair (including a wheelchair)? 3  -RW 3  -CK 2  -MS    Standing up from a chair using your arms (e.g., wheelchair, bedside chair)? 2  -RW 3  -CK 2  -MS    Climbing 3-5 steps with a railing? 2  -RW 2  -CK 2  -MS    To walk in hospital room? 3  -RW 2  -CK 2  -MS    AM-PAC 6 Clicks Score 16  -RW 16  -CK 13  -MS    Functional Assessment    Outcome Measure Options AM-PAC 6 Clicks Basic Mobility (PT)  -RW AM-PAC 6 Clicks Basic Mobility (PT)  -CK AM-PAC 6 Clicks Basic Mobility (PT)  -MS      User Key  (r) = Recorded By, (t) = Taken By, (c) = Cosigned By    Initials Name Provider Type    CK Jay Núñez, PT Physical Therapist    MS Efra BRIAN Arroyo, PT Physical Therapist    RW Amanda Ospina PTA Physical Therapy Assistant           Time Calculation:         PT Charges       07/05/17 1105          Time Calculation    Start Time 1101  -RW      Stop Time 1127  -RW      Time Calculation (min) 26 min  -RW      PT Received On 07/05/17  -RW      PT - Next Appointment 07/06/17  -        User Key  (r) = Recorded By, (t) = Taken By, (c) = Cosigned By    Initials Name Provider Type    RW Amanda Ospina PTA Physical Therapy Assistant          Therapy Charges for Today     Code Description Service Date Service Provider Modifiers Qty    [id number] HC PT THER PROC EA 15 MIN 7/5/2017 Amanda Ospina PTA GP 2          PT G-Codes  Outcome Measure Options: AM-PAC 6 Clicks Basic Mobility (PT)    Amanda Ospina PTA  7/5/2017

## 2017-07-05 NOTE — DISCHARGE PLACEMENT REQUEST
"Herbert Schneider (71 y.o. Male)     Date of Birth Social Security Number Address Home Phone MRN    1945  [id number]  [place] [id number] [phone] [id number]    Holiness Marital Status          Caodaism        Admission Date Admission Type Admitting Provider Attending Provider Department, Room/Bed    6/30/17 Emergency Jaime Willoughby MD [place], Robert Russell, MD [address], P781/1    Discharge Date Discharge Disposition Discharge Destination                      Attending Provider: Herbert Mccoy MD     Allergies:  No Known Allergies    Isolation:  None   Infection:  None   Code Status:  FULL    Ht:  72\" (182.9 cm)   Wt:  263 lb (119 kg)    Admission Cmt:  None   Principal Problem:  Closed right hip fracture [S72.001A]                 Active Insurance as of 6/30/2017     Primary Coverage     Payor Plan Insurance Group Employer/Plan Group    WORKERS COMPENSATION MISC WORKERS COMPENSATION      Coverage Address Coverage Phone Number Effective From Effective To    [address] [facility] [address] [phone] 6/30/2017     [place] [address]       Subscriber Name Subscriber Birth Date Member ID       HERBERT SCHNEIDER 1945 [id number]                 Emergency Contacts      (Rel.) Home Phone Work Phone Mobile Phone    Marci Schneider (Spouse) [phone] -- --          "

## 2017-07-05 NOTE — PROGRESS NOTES
Continued Stay Note  [facility]     Patient Name: Demetris Nye  MRN: [id number]  Today's Date: 7/5/2017    Admit Date: 6/30/2017          Discharge Plan       07/05/17 1623    Case Management/Social Work Plan    Additional Comments Pt would like to d/c to [facility] or [facility], referral given to Chelle with [facility] who states they will review and attempt to contact Workers Comp. CCP to follow.      07/05/17 1618    Case Management/Social Work Plan    Additional Comments Spoke with Papa Torres, case  who states the pts case is being reassigned to another  who will be able to approve d/c needs. Spoke with pt and wife, pt would still like to d/c to . Per Physical Therapist pt will need SNF at d/c. Per Papa, new  should call 7/5 or 7/6 to discuss the d/c planning. CCP to follow.              Discharge Codes     None            Marta Hand RN

## 2017-07-05 NOTE — PLAN OF CARE
Problem: Patient Care Overview (Adult)  Goal: Plan of Care Review  Outcome: Ongoing (interventions implemented as appropriate)    07/05/17 0509   Coping/Psychosocial Response Interventions   Plan Of Care Reviewed With patient   Patient Care Overview   Progress improving   Outcome Evaluation   Outcome Summary/Follow up Plan po pain medication effective, c/o right foot pain, voiding, educated on self management of hypertension and monitoring b/p at home       Goal: Adult Individualization and Mutuality  Outcome: Ongoing (interventions implemented as appropriate)    Problem: Orthopaedic Fracture (Adult)  Goal: Signs and Symptoms of Listed Potential Problems Will be Absent or Manageable (Orthopaedic Fracture)  Outcome: Ongoing (interventions implemented as appropriate)    Problem: Fall Risk (Adult)  Goal: Absence of Falls  Outcome: Ongoing (interventions implemented as appropriate)

## 2017-07-05 NOTE — PLAN OF CARE
Problem: Patient Care Overview (Adult)  Goal: Plan of Care Review  Outcome: Ongoing (interventions implemented as appropriate)    07/05/17 1141   Coping/Psychosocial Response Interventions   Plan Of Care Reviewed With patient   Outcome Evaluation   Outcome Summary/Follow up Plan Pt ambulation distance limited due to pain and fatigue this am. Verbal cueing required for safety and correct sequencing during ambulation. Pt required several standing rest breaks due to fatigue and pain as well. Would benefit from SNF upon d/c.

## 2017-07-05 NOTE — PROGRESS NOTES
" LOS: 5 days     Name: Demetris Nye  Age: 71 y.o.  Sex: male  :  1945  MRN: [id number]         Primary Care Physician: Rian Morfin MD    Subjective   Subjective  Complains of point tenderness to the top of right foot.  Began this morning.  Able to flex and extend the ankle without pain.  No calf pain.  No swelling of the foot or skin change    Objective   Vital Signs  Temp:  [97.9 °F (36.6 °C)-98.5 °F (36.9 °C)] 98.5 °F (36.9 °C)  Heart Rate:  [66-71] 70  Resp:  [18] 18  BP: (120-150)/(68-99) 150/99  Body mass index is 35.67 kg/(m^2).    Objective:  General Appearance:  Comfortable and in no acute distress.    Vital signs: (most recent): Blood pressure 150/99, pulse 70, temperature 98.5 °F (36.9 °C), temperature source Tympanic, resp. rate 18, height 72\" (182.9 cm), weight 263 lb (119 kg), SpO2 96 %.    Lungs:  Normal respiratory rate and normal effort.    Heart: Normal rate.  Regular rhythm.    Abdomen: Abdomen is soft.  Bowel sounds are normal.   There is no abdominal tenderness.     Extremities: There is no local swelling or dependent edema.  (Point tenderness to top of right foot.  Good flexion and extension of the ankle without pain.  No calf tenderness or palpable cord.  No swelling or skin change.)  Neurological: Patient is alert and oriented to person, place and time.    Skin:  Warm and dry.              Results Review:       I reviewed the patient's new clinical results.      Results from last 7 days  Lab Units 17  1259 17  0345 17  0324 17  1735   WBC 10*3/mm3  --   --  11.17* 11.77*   HEMOGLOBIN g/dL 14.1 14.1 15.5 16.1   PLATELETS 10*3/mm3  --   --  134* 150       Results from last 7 days  Lab Units 17  0324 17  1735   SODIUM mmol/L 139 139   POTASSIUM mmol/L 4.1 4.3   CHLORIDE mmol/L 102 101   CO2 mmol/L 22.0 27.7   BUN mg/dL 11 12   CREATININE mg/dL 0.96 1.04   CALCIUM mg/dL 8.9 9.3   GLUCOSE mg/dL 106* 109*                 Scheduled Meds: "     amLODIPine 10 mg Oral Daily   atorvastatin 10 mg Oral Daily   carvedilol 12.5 mg Oral Daily   enoxaparin 40 mg Subcutaneous Daily   losartan 100 mg Oral Daily   sennosides-docusate sodium 2 tablet Oral BID     PRN Meds:   •  acetaminophen  •  bisacodyl  •  diphenhydrAMINE  •  HYDROcodone-acetaminophen  •  melatonin  •  ondansetron **OR** ondansetron ODT **OR** ondansetron  •  pneumococcal polysaccharide 23-valent  •  sodium chloride  Continuous Infusions:       Assessment/Plan   Principal Problem:    Closed right hip fracture  Active Problems:    Fall    HTN (hypertension)    PVC (premature ventricular contraction)      Assessment & Plan    - Complains of very small area of point tenderness to the top of right foot.  Began this morning.  Able to flex and extend the ankle without pain.  No calf pain.  No swelling of the foot or skin change.  No trauma to the area. Will monitor and see how he does with PT today.  - Blood pressure stable  - Hgb stable  - CCP working on rehab placement  - Lovenox for DVT prophylaxis    Demetris Mccoy MD  [facility]  07/05/17  10:01 AM

## 2017-07-05 NOTE — PLAN OF CARE
Problem: Patient Care Overview (Adult)  Goal: Plan of Care Review  Outcome: Ongoing (interventions implemented as appropriate)    07/05/17 0504 07/05/17 1141 07/05/17 1638   Coping/Psychosocial Response Interventions   Plan Of Care Reviewed With --  patient --    Patient Care Overview   Progress improving --  --    Outcome Evaluation   Outcome Summary/Follow up Plan --  --  pain is controlled with minimal pain medication. vss. dressing c/d/i. family at bedside. pt wroking well wtih therapy. having to take breaks r/t fatigue. pt to be d/c'd to rehab. awating approval from worker's comp.discussed with pt the importance of blood pressure monitorting with HO HTN.        Goal: Adult Individualization and Mutuality  Outcome: Ongoing (interventions implemented as appropriate)  Goal: Discharge Needs Assessment  Outcome: Ongoing (interventions implemented as appropriate)    Problem: Orthopaedic Fracture (Adult)  Goal: Signs and Symptoms of Listed Potential Problems Will be Absent or Manageable (Orthopaedic Fracture)  Outcome: Ongoing (interventions implemented as appropriate)    Problem: Fall Risk (Adult)  Goal: Identify Related Risk Factors and Signs and Symptoms  Outcome: Ongoing (interventions implemented as appropriate)  Goal: Absence of Falls  Outcome: Ongoing (interventions implemented as appropriate)    Problem: Perioperative Period (Adult)  Goal: Signs and Symptoms of Listed Potential Problems Will be Absent or Manageable (Perioperative Period)  Outcome: Ongoing (interventions implemented as appropriate)

## 2017-07-06 ENCOUNTER — APPOINTMENT (OUTPATIENT)
Dept: GENERAL RADIOLOGY | Facility: HOSPITAL | Age: 72
End: 2017-07-06
Attending: INTERNAL MEDICINE

## 2017-07-06 LAB — PLATELET # BLD AUTO: 203 10*3/MM3 (ref 140–500)

## 2017-07-06 PROCEDURE — 85049 AUTOMATED PLATELET COUNT: CPT | Performed by: INTERNAL MEDICINE

## 2017-07-06 PROCEDURE — 73630 X-RAY EXAM OF FOOT: CPT

## 2017-07-06 PROCEDURE — 25010000002 ENOXAPARIN PER 10 MG: Performed by: ORTHOPAEDIC SURGERY

## 2017-07-06 PROCEDURE — 97110 THERAPEUTIC EXERCISES: CPT

## 2017-07-06 PROCEDURE — 94799 UNLISTED PULMONARY SVC/PX: CPT

## 2017-07-06 RX ADMIN — HYDROCODONE BITARTRATE AND ACETAMINOPHEN 1 TABLET: 7.5; 325 TABLET ORAL at 00:32

## 2017-07-06 RX ADMIN — ATORVASTATIN CALCIUM 10 MG: 10 TABLET, FILM COATED ORAL at 07:53

## 2017-07-06 RX ADMIN — HYDROCODONE BITARTRATE AND ACETAMINOPHEN 1 TABLET: 7.5; 325 TABLET ORAL at 09:50

## 2017-07-06 RX ADMIN — HYDROCODONE BITARTRATE AND ACETAMINOPHEN 1 TABLET: 7.5; 325 TABLET ORAL at 04:59

## 2017-07-06 RX ADMIN — LOSARTAN POTASSIUM 100 MG: 50 TABLET, FILM COATED ORAL at 07:53

## 2017-07-06 RX ADMIN — SENNA AND DOCUSATE SODIUM 2 TABLET: 50; 8.6 TABLET, FILM COATED ORAL at 17:12

## 2017-07-06 RX ADMIN — SENNA AND DOCUSATE SODIUM 2 TABLET: 50; 8.6 TABLET, FILM COATED ORAL at 07:53

## 2017-07-06 RX ADMIN — AMLODIPINE BESYLATE 10 MG: 10 TABLET ORAL at 07:54

## 2017-07-06 RX ADMIN — HYDROCODONE BITARTRATE AND ACETAMINOPHEN 1 TABLET: 7.5; 325 TABLET ORAL at 17:12

## 2017-07-06 RX ADMIN — CARVEDILOL 12.5 MG: 12.5 TABLET, FILM COATED ORAL at 07:53

## 2017-07-06 RX ADMIN — ENOXAPARIN SODIUM 40 MG: 40 INJECTION SUBCUTANEOUS at 07:54

## 2017-07-06 NOTE — PLAN OF CARE
Problem: Patient Care Overview (Adult)  Goal: Plan of Care Review  Outcome: Ongoing (interventions implemented as appropriate)    07/06/17 0300   Coping/Psychosocial Response Interventions   Plan Of Care Reviewed With patient   Patient Care Overview   Progress improving   Outcome Evaluation   Outcome Summary/Follow up Plan htn and pain well controlled with PO meds.          Problem: Fall Risk (Adult)  Goal: Identify Related Risk Factors and Signs and Symptoms  Outcome: Ongoing (interventions implemented as appropriate)

## 2017-07-06 NOTE — PLAN OF CARE
Problem: Patient Care Overview (Adult)  Goal: Plan of Care Review  Outcome: Ongoing (interventions implemented as appropriate)    07/06/17 1606   Coping/Psychosocial Response Interventions   Plan Of Care Reviewed With patient   Outcome Evaluation   Outcome Summary/Follow up Plan Pt con't to require cueing for improved safety awareness and upright posture. Demo'd increased upright stability and ambulation endurance. HEP reivewed. No new concerns at this time.

## 2017-07-06 NOTE — PROGRESS NOTES
" LOS: 6 days     Name: Demetris Nye  Age: 71 y.o.  Sex: male  :  1945  MRN: [id number]         Primary Care Physician: Rian Morfin MD    Subjective   Subjective  Denies Cp or SOA.  C/o pain over the top of his right foot.  A little worse than yesterday.  Mostly tender with palpation but does hurt some when he bears weight    Objective   Vital Signs  Temp:  [97.9 °F (36.6 °C)-99.1 °F (37.3 °C)] 98.1 °F (36.7 °C)  Heart Rate:  [59-72] 67  Resp:  [16-18] 16  BP: (120-139)/(69-80) 126/70  Body mass index is 35.67 kg/(m^2).    Objective:  General Appearance:  Comfortable and in no acute distress.    Vital signs: (most recent): Blood pressure 126/70, pulse 67, temperature 98.1 °F (36.7 °C), temperature source Oral, resp. rate 16, height 72\" (182.9 cm), weight 263 lb (119 kg), SpO2 94 %.    Lungs:  Normal respiratory rate and normal effort.    Heart: Normal rate.  Regular rhythm.    Abdomen: Abdomen is soft.  Bowel sounds are normal.   There is no abdominal tenderness.     Extremities: There is no local swelling or dependent edema.  (Point tenderness to a quarter size area of the dorsal aspect of right foot.  Good flex/ext of the ankle.  No swelling or skin change)  Neurological: Patient is alert and oriented to person, place and time.    Skin:  Warm and dry.              Results Review:       I reviewed the patient's new clinical results.      Results from last 7 days  Lab Units 17  0442 17  1259 17  0345 17  0324 17  1735   WBC 10*3/mm3  --   --   --  11.17* 11.77*   HEMOGLOBIN g/dL  --  14.1 14.1 15.5 16.1   PLATELETS 10*3/mm3 203  --   --  134* 150       Results from last 7 days  Lab Units 17  0324 17  1735   SODIUM mmol/L 139 139   POTASSIUM mmol/L 4.1 4.3   CHLORIDE mmol/L 102 101   CO2 mmol/L 22.0 27.7   BUN mg/dL 11 12   CREATININE mg/dL 0.96 1.04   CALCIUM mg/dL 8.9 9.3   GLUCOSE mg/dL 106* 109*                 Scheduled Meds:     amLODIPine 10 mg " Oral Daily   atorvastatin 10 mg Oral Daily   carvedilol 12.5 mg Oral Daily   enoxaparin 40 mg Subcutaneous Daily   losartan 100 mg Oral Daily   sennosides-docusate sodium 2 tablet Oral BID     PRN Meds:   •  acetaminophen  •  bisacodyl  •  diphenhydrAMINE  •  HYDROcodone-acetaminophen  •  melatonin  •  ondansetron **OR** ondansetron ODT **OR** ondansetron  •  pneumococcal polysaccharide 23-valent  •  sodium chloride  Continuous Infusions:       Assessment/Plan   Principal Problem:    Closed right hip fracture  Active Problems:    Fall    HTN (hypertension)    PVC (premature ventricular contraction)      Assessment & Plan    - Pain over the top of right foot persists.  Likely muscular/tendon in nature as he adjusts to walking after hip fracture repair but will check an xray to r/o fracture  - Blood pressure stable  - Hgb stable  - CCP working on rehab placement  - Lovenox for DVT prophylaxis    Demetris Mccoy MD  [facility]  07/06/17  8:29 AM

## 2017-07-06 NOTE — THERAPY TREATMENT NOTE
Acute Care - Physical Therapy Treatment Note  [facility]     Patient Name: Demetris Nye  : 1945  MRN: [id number]  Today's Date: 2017             Admit Date: 2017    Visit Dx:    ICD-10-CM ICD-9-CM   1. Fall, initial encounter W19.XXXA E888.9   2. Closed right hip fracture, initial encounter S72.001A 820.8   3. Impaired mobility Z74.09 799.89     Patient Active Problem List   Diagnosis   • Fall   • Closed right hip fracture   • HTN (hypertension)   • PVC (premature ventricular contraction)               Adult Rehabilitation Note       17 1539 17 1100 17 0900    Rehab Assessment/Intervention    Discipline physical therapy assistant  -OSCAR physical therapy assistant  -RW physical therapist  -CK    Document Type therapy note (daily note)  -OSCAR therapy note (daily note)  -RW therapy note (daily note)  -CK    Subjective Information agree to therapy  -OSCAR agree to therapy;complains of;pain  -RW agree to therapy;complains of;weakness;pain  -CK    Patient Effort, Rehab Treatment good  -OSCAR good  -RW good  -CK    Precautions/Limitations fall precautions;hip precautions- right  -OSCAR fall precautions;hip precautions- right  -RW fall precautions;hip precautions- right  -CK    Recorded by [OSCAR] Hang Johnson PTA [RW] Amanda Ospina PTA [CK] Jay Núñez, PT    Vital Signs    Pre SpO2 (%)  93  -RW 95  -CK    O2 Delivery Pre Treatment  room air  -RW supplemental O2   2L  -CK    Pre Patient Position   Sitting  -CK    Recorded by  [RW] Amanda Ospina PTA [CK] Jay Núñez, PT    Pain Assessment    Pain Assessment 0-10  -OSCAR 0-10  -RW 0-10  -CK    Pain Score 4  -OSCAR 6  -RW 5  -CK    Post Pain Score 4  -OSCAR  5  -CK    Pain Type Acute pain;Surgical pain  -OSCAR Acute pain  -RW Acute pain;Surgical pain  -CK    Pain Location Leg  -OSCAR Foot  -RW Leg  -CK    Pain Orientation Right  -OSCAR Right  -RW Right  -CK    Pain Intervention(s) Ambulation/increased activity;Repositioned;Rest  -OSCAR Medication (See  MAR);Repositioned;Ambulation/increased activity  -RW Medication (See MAR);Cold applied;Repositioned  -CK    Response to Interventions  tolerated  -RW     Multiple Pain Sites  Yes  -RW     Recorded by [OSCAR] Hang Johnson PTA [RW] Amanda Ospina PTA [CK] Jay Núñez, PT    Pain 2    Pain Score 2  6  -RW     Pain Type 2  Acute pain;Surgical pain  -RW     Pain Location 2  Groin  -RW     Pain Orientation 2  Right  -RW     Pain Intervention(s) 2  Medication (See MAR);Cold applied;Repositioned;Ambulation/increased activity  -RW     Response to Interventions 2  tolerated, unchanged  -RW     Recorded by  [RW] Amanda Ospina PTA     Cognitive Assessment/Intervention    Current Cognitive/Communication Assessment functional  -OSCAR functional  -RW     Orientation Status oriented x 4  -OSCAR oriented x 4  -RW     Follows Commands/Answers Questions 100% of the time;able to follow single-step instructions;needs cueing;needs increased time;needs repetition  -OSCAR 75% of the time;able to follow single-step instructions;needs cueing  -RW     Personal Safety mild impairment;decreased awareness, need for assist;decreased awareness, need for safety;decreased insight to deficits  -OSCAR mild impairment;at risk behaviors demonstrated;decreased awareness, need for assist;decreased awareness, need for safety;decreased insight to deficits  -RW     Personal Safety Interventions fall prevention program maintained;gait belt;nonskid shoes/slippers when out of bed  -OSCAR fall prevention program maintained;gait belt;nonskid shoes/slippers when out of bed  -RW     Recorded by [OSCAR] Hang Johnson PTA [RW] Amanda Ospina PTA     Bed Mobility, Assessment/Treatment    Bed Mob, Supine to Sit, [place] not tested  -OSCAR not tested  -RW     Bed Mob, Sit to Supine, [place] not tested  -OSCAR not tested  -RW     Bed Mobility, Comment  Pt up in chair  -RW up in chair  -CK    Recorded by [OSCAR] Hang Johnson PTA [RW] Amanda Ospina, PTA [CK] Jay Núñez, PT     Transfer Assessment/Treatment    Transfers, Sit-Stand [place] minimum assist (75% patient effort);nonverbal cues required (demo/gesture);verbal cues required  -OSCAR moderate assist (50% patient effort);verbal cues required;nonverbal cues required (demo/gesture)  -RW minimum assist (75% patient effort);2 person assist required;verbal cues required  -CK    Transfers, Stand-Sit [place] minimum assist (75% patient effort);nonverbal cues required (demo/gesture);verbal cues required  -OSCAR moderate assist (50% patient effort);verbal cues required;nonverbal cues required (demo/gesture)  -RW minimum assist (75% patient effort);verbal cues required;2 person assist required  -CK    Transfers, Sit-Stand-Sit, Assist Device rolling walker  -OSCAR rolling walker  -RW rolling walker  -CK    Toilet Transfer, [place]   minimum assist (75% patient effort);2 person assist required;verbal cues required  -CK    Toilet Transfer, Assistive Device   rolling walker  -CK    Transfer, Impairments strength decreased;impaired balance;pain  -OSCAR      Transfer, Comment  Very slow to come to stand. Verbal cueing required for hand placement and RLE placement  -RW     Recorded by [OSCAR] Hang Johnson, PTA [RW] Amanda Ospina, PTA [CK] Jay Núñez PT    Gait Assessment/Treatment    Gait, [place] Level minimum assist (75% patient effort);verbal cues required;nonverbal cues required (demo/gesture)  -OSCAR minimum assist (75% patient effort);verbal cues required  -RW minimum assist (75% patient effort);2 person assist required;verbal cues required;nonverbal cues required (demo/gesture)  -CK    Gait, Assistive Device rolling walker  -OSCAR rolling walker  -RW rolling walker  -CK    Gait, Distance (Feet) 75  -OSCAR 30  -RW 40  -CK    Gait, Gait Deviations tamara decreased;decreased heel strike;forward flexed posture;limb motion velocity decreased;step length decreased;toe-to-floor clearance decreased;weight-shifting ability decreased  -OSCAR forward  flexed posture;bilateral:;tamara decreased;right:;step length decreased;toe-to-floor clearance decreased;weight-shifting ability decreased  -RW tamara decreased;decreased heel strike;step length decreased;forward flexed posture;weight-shifting ability decreased  -CK    Gait, Safety Issues step length decreased;weight-shifting ability decreased;balance decreased during turns  -OSCAR sequencing ability decreased;step length decreased;weight-shifting ability decreased  -RW     Gait, Impairments strength decreased;impaired balance;pain  -OSCAR strength decreased;impaired balance  -RW     Gait, Comment Pt frequently cued for improved posture and (B)step length/height  -OSCAR Verbal cueing required for correct sequencing w/ gait. Pt required several standing rest breaks due to fatigue and pain. Further distance limited due to fatigue and pain  -RW Requires verbal cueing for all mobility  -CK    Recorded by [OSCAR] Hang Johnson PTA [RW] Amanda Ospina PTA [CK] Jay Núñez PT    Therapy Exercises    Bilateral Lower Extremities   AROM:;10 reps;sitting;ankle pumps/circles;glut sets;hip abduction/adduction;LAQ;quad sets  -CK    Exercise Protocols hip ORIF  -OSCAR hip ORIF  -RW     Hip ORIF Exercises right:;15 reps;completed protocol  -OSCAR right:;15 reps;heel slides;hip abduction;LAQ  -RW     Recorded by [OSCAR] Hang Johnson PTA [RW] Amanda Ospina PTA [CK] Jay Núñez PT    Positioning and Restraints    Pre-Treatment Position sitting in chair/recliner  -OSCAR sitting in chair/recliner  -RW sitting in chair/recliner  -CK    Post Treatment Position chair  -OSCAR chair  -RW chair  -CK    In Chair sitting;call light within reach;encouraged to call for assist  -OSCAR reclined;call light within reach;encouraged to call for assist  -RW reclined;sitting;notified nsg;with family/caregiver  -CK    Recorded by [OSCAR] Hang Johnson PTA [RW] Amanda Ospina, MICAELA [CK] Jay Núñez PT      User Key  (r) = Recorded By, (t) = Taken By, (c) = Cosigned By     Initials Name Effective Dates    CK Jay Núñez, PT 04/24/15 -     RW Amanda Ospina, PTA 04/06/16 -     OSCAR Johnson, PTA 04/24/15 -                 IP PT Goals       07/01/17 1534          Bed Mobility PT LTG    Bed Mobility PT LTG, Date Established 07/01/17  -CK      Bed Mobility PT LTG, Time to Achieve 5 days  -CK      Bed Mobility PT LTG, Activity Type all bed mobility  -CK      Bed Mobility PT LTG, [place] minimum assist (75% patient effort)  -CK      Bed Mobility PT Goal  LTG, Assist Device bed rails  -CK      Transfer Training PT LTG    Transfer Training PT LTG, Date Established 07/01/17  -CK      Transfer Training PT LTG, Time to Achieve 5 days  -CK      Transfer Training PT LTG, Activity Type bed to chair /chair to bed  -CK      Transfer Training PT LTG, [place] minimum assist (75% patient effort)  -CK      Transfer Training PT LTG, Assist Device walker, rolling  -CK      Transfer Training 2 PT LTG    Transfer Training PT 2 LTG, Date Established 07/01/17  -CK      Transfer Training PT 2 LTG, Activity Type sit to stand/stand to sit  -CK      Transfer Training PT 2 LTG, [place] Level contact guard assist  -CK      Transfer Training PT 2 LTG, Assist Device walker, rolling  -CK      Gait Training PT LTG    Gait Training Goal PT LTG, Date Established 07/01/17  -CK      Gait Training Goal PT LTG, Time to Achieve 5 days  -CK      Gait Training Goal PT LTG, [place] Level minimum assist (75% patient effort)  -CK      Gait Training Goal PT LTG, Assist Device walker, rolling  -CK      Gait Training Goal PT LTG, Distance to Achieve 100 feet  -CK        User Key  (r) = Recorded By, (t) = Taken By, (c) = Cosigned By    Initials Name Provider Type    CK Jay Gironopf, PT Physical Therapist          Physical Therapy Education     Title: PT OT SLP Therapies (Done)     Topic: Physical Therapy (Done)     Point: Mobility training (Done)    Learning Progress Summary    Learner Readiness  Method Response Comment Documented by Status   Patient Acceptance E VU,NR   07/06/17 1606 Done    Acceptance E,TB,D VU,NR   07/05/17 1141 Done    Acceptance E VU   07/04/17 1000 Done    Acceptance E,D VU,NR  MS 07/03/17 1013 Done    Acceptance E,TB VU   07/02/17 1600 Done    Acceptance E VU Educated on WBAT status  07/01/17 1533 Done   Family Acceptance E,TB VU   07/02/17 1600 Done               Point: Home exercise program (Done)    Learning Progress Summary    Learner Readiness Method Response Comment Documented by Status   Patient Acceptance E VU,NR   07/06/17 1606 Done    Acceptance E,TB,D VU,NR   07/05/17 1141 Done    Acceptance E,D VU,NR  MS 07/03/17 1013 Done    Acceptance E,TB VU   07/02/17 1600 Done    Acceptance E VU   07/02/17 0929 Done   Family Acceptance E,TB VU   07/02/17 1600 Done               Point: Body mechanics (Done)    Learning Progress Summary    Learner Readiness Method Response Comment Documented by Status   Patient Acceptance E VU,NR   07/06/17 1606 Done    Acceptance E,TB,D VU,NR   07/05/17 1141 Done    Acceptance E,D VU,NR  MS 07/03/17 1013 Done               Point: Precautions (Done)    Learning Progress Summary    Learner Readiness Method Response Comment Documented by Status   Patient Acceptance E VU,NR   07/06/17 1606 Done    Acceptance E,TB,D VU,NR   07/05/17 1141 Done    Acceptance E,D VU,NR  MS 07/03/17 1013 Done    Acceptance E,TB VU   07/02/17 1600 Done    Acceptance E VU Educated on WBAT status  07/01/17 1533 Done   Family Acceptance E,TB VU   07/02/17 1600 Done                      User Key     Initials Effective Dates Name Provider Type Discipline     04/24/15 -  Jay Núñez, PT Physical Therapist PT     06/16/16 -  Babs Jordan, RN Registered Nurse Nurse    MS 12/01/15 -  Efra Arroyo, PT Physical Therapist PT     04/06/16 -  Amanda Ospina, PTA Physical Therapy Assistant PT     04/24/15 -  Hang Johnson PTA Physical  Therapy Assistant PT                    PT Recommendation and Plan  Anticipated Discharge Disposition: skilled nursing facility  PT Frequency: daily  Plan of Care Review  Plan Of Care Reviewed With: patient  Outcome Summary/Follow up Plan: Pt con't to require cueing for improved safety awareness and upright posture.  Demo'd increased upright stability and ambulation endurance.  HEP reivewed.  No new concerns at this time.           Outcome Measures       07/06/17 1600 07/05/17 1100 07/04/17 1000    How much help from another person do you currently need...    Turning from your back to your side while in flat bed without using bedrails? 3  -OSCAR 3  -RW 3  -CK    Moving from lying on back to sitting on the side of a flat bed without bedrails? 3  -OSCAR 3  -RW 3  -CK    Moving to and from a bed to a chair (including a wheelchair)? 3  -OSCAR 3  -RW 3  -CK    Standing up from a chair using your arms (e.g., wheelchair, bedside chair)? 3  -OSCAR 2  -RW 3  -CK    Climbing 3-5 steps with a railing? 2  -OSCAR 2  -RW 2  -CK    To walk in hospital room? 3  -OSCAR 3  -RW 2  -CK    AM-PAC 6 Clicks Score 17  -OSCAR 16  -RW 16  -CK    Functional Assessment    Outcome Measure Options AM-PAC 6 Clicks Basic Mobility (PT)  -OSCAR AM-PAC 6 Clicks Basic Mobility (PT)  -RW AM-PAC 6 Clicks Basic Mobility (PT)  -CK      User Key  (r) = Recorded By, (t) = Taken By, (c) = Cosigned By    Initials Name Provider Type    CK Jay Núñez, PT Physical Therapist    RW Amanda Ospina, PTA Physical Therapy Assistant    OSCAR Johnson PTA Physical Therapy Assistant           Time Calculation:         PT Charges       07/06/17 1606          Time Calculation    Start Time 1539  -OSCAR      Stop Time 1602  -OSCAR      Time Calculation (min) 23 min  -OSCAR      PT Received On 07/06/17  -OSCAR      PT - Next Appointment 07/07/17  -OSCAR        User Key  (r) = Recorded By, (t) = Taken By, (c) = Cosigned By    Initials Name Provider Type    OSCAR Johnson PTA Physical Therapy Assistant           Therapy Charges for Today     Code Description Service Date Service Provider Modifiers Qty    [id number] HC PT THER PROC EA 15 MIN 7/6/2017 Hang Johnson PTA GP 2          PT G-Codes  Outcome Measure Options: AM-PAC 6 Clicks Basic Mobility (PT)    Hang Johnson PTA  7/6/2017

## 2017-07-07 ENCOUNTER — APPOINTMENT (OUTPATIENT)
Dept: GENERAL RADIOLOGY | Facility: HOSPITAL | Age: 72
End: 2017-07-07
Attending: INTERNAL MEDICINE

## 2017-07-07 PROCEDURE — 25010000002 ENOXAPARIN PER 10 MG: Performed by: ORTHOPAEDIC SURGERY

## 2017-07-07 PROCEDURE — 97110 THERAPEUTIC EXERCISES: CPT

## 2017-07-07 PROCEDURE — 74022 RADEX COMPL AQT ABD SERIES: CPT

## 2017-07-07 PROCEDURE — 94799 UNLISTED PULMONARY SVC/PX: CPT

## 2017-07-07 RX ORDER — BISACODYL 10 MG
10 SUPPOSITORY, RECTAL RECTAL DAILY
Status: DISCONTINUED | OUTPATIENT
Start: 2017-07-08 | End: 2017-07-14 | Stop reason: HOSPADM

## 2017-07-07 RX ORDER — SACCHAROMYCES BOULARDII 250 MG
250 CAPSULE ORAL 2 TIMES DAILY
Status: DISCONTINUED | OUTPATIENT
Start: 2017-07-07 | End: 2017-07-14 | Stop reason: HOSPADM

## 2017-07-07 RX ORDER — POLYETHYLENE GLYCOL 3350 17 G/17G
17 POWDER, FOR SOLUTION ORAL DAILY
Status: DISCONTINUED | OUTPATIENT
Start: 2017-07-07 | End: 2017-07-14 | Stop reason: HOSPADM

## 2017-07-07 RX ADMIN — Medication 250 MG: at 21:20

## 2017-07-07 RX ADMIN — BISACODYL 10 MG: 10 SUPPOSITORY RECTAL at 03:45

## 2017-07-07 RX ADMIN — SENNA AND DOCUSATE SODIUM 2 TABLET: 50; 8.6 TABLET, FILM COATED ORAL at 08:36

## 2017-07-07 RX ADMIN — SENNA AND DOCUSATE SODIUM 2 TABLET: 50; 8.6 TABLET, FILM COATED ORAL at 21:20

## 2017-07-07 RX ADMIN — LOSARTAN POTASSIUM 100 MG: 50 TABLET, FILM COATED ORAL at 08:36

## 2017-07-07 RX ADMIN — AMLODIPINE BESYLATE 10 MG: 10 TABLET ORAL at 08:36

## 2017-07-07 RX ADMIN — POLYETHYLENE GLYCOL 3350 17 G: 17 POWDER, FOR SOLUTION ORAL at 11:59

## 2017-07-07 RX ADMIN — CARVEDILOL 12.5 MG: 12.5 TABLET, FILM COATED ORAL at 08:36

## 2017-07-07 RX ADMIN — ATORVASTATIN CALCIUM 10 MG: 10 TABLET, FILM COATED ORAL at 08:36

## 2017-07-07 RX ADMIN — ENOXAPARIN SODIUM 40 MG: 40 INJECTION SUBCUTANEOUS at 08:36

## 2017-07-07 RX ADMIN — ONDANSETRON 4 MG: 4 TABLET, ORALLY DISINTEGRATING ORAL at 12:06

## 2017-07-07 NOTE — PROGRESS NOTES
Continued Stay Note  [facility]     Patient Name: Demetris Nye  MRN: [id number]  Today's Date: 7/7/2017    Admit Date: 6/30/2017          Discharge Plan       07/07/17 1517    Case Management/Social Work Plan    Plan SNF vs     Patient/Family In Agreement With Plan yes    Additional Comments Spoke with pts / Elvira Rolle [phone] or  Azul Pengsier [phone], discussed d/c planning with Workers Comp. Pt would like to d/c to [facility], referral given to Chelle with [facility] who states they would be able to accept if workers comp would agree on payment. Per Amberma,  is agreeable to pay 43% which is around $230/day. Due to payment no [facility] are able to accept. Discussed with pt and wife, pt at this time would like to d/c home with [facility], referral given to Lyla with [facility] who states they are able to accept. Pt states they will need walker, BSC and wheelchair. Referral given to [facility] who states they can not deliver DME until approved by .  Also discussed options of d/c'ing to  [facility] or  [facility] , [facility] and to Beatris with  [facility] who are reviewing to determine whether they can accept. At this time pts d/c plan is TBD based on if a SNF is able to accept. CCP or nursing to notify [facility] or arrange SNF placement once notified if able to accept. CCP to follow.              Discharge Codes     None            Marta Hand RN

## 2017-07-07 NOTE — PROGRESS NOTES
" LOS: 7 days     Name: Demetris Nye  Age: 71 y.o.  Sex: male  :  1945  MRN: [id number]         Primary Care Physician: Rian Morfin MD    Subjective   Subjective  Feeling constipated.  Not able to eat much breakfast due to feeling of fullness.  Denies N/V. Right foot pain improved today    Objective   Vital Signs  Temp:  [97.8 °F (36.6 °C)-99.8 °F (37.7 °C)] 97.8 °F (36.6 °C)  Heart Rate:  [62-89] 89  Resp:  [16] 16  BP: (105-120)/(64-70) 120/70  Body mass index is 35.67 kg/(m^2).    Objective:  General Appearance:  Comfortable and in no acute distress.    Vital signs: (most recent): Blood pressure 120/70, pulse 89, temperature 97.8 °F (36.6 °C), temperature source Oral, resp. rate 16, height 72\" (182.9 cm), weight 263 lb (119 kg), SpO2 92 %.    Lungs:  Normal respiratory rate and normal effort.    Heart: Normal rate.  Regular rhythm.    Abdomen: Abdomen is soft and distended.  There is no abdominal tenderness.     Extremities: There is no local swelling or dependent edema.    Neurological: Patient is alert and oriented to person, place and time.    Skin:  Warm and dry.              Results Review:       I reviewed the patient's new clinical results.      Results from last 7 days  Lab Units 17  0442 17  1259 17  0345 17  0324 17  1735   WBC 10*3/mm3  --   --   --  11.17* 11.77*   HEMOGLOBIN g/dL  --  14.1 14.1 15.5 16.1   PLATELETS 10*3/mm3 203  --   --  134* 150       Results from last 7 days  Lab Units 17  0324 17  1735   SODIUM mmol/L 139 139   POTASSIUM mmol/L 4.1 4.3   CHLORIDE mmol/L 102 101   CO2 mmol/L 22.0 27.7   BUN mg/dL 11 12   CREATININE mg/dL 0.96 1.04   CALCIUM mg/dL 8.9 9.3   GLUCOSE mg/dL 106* 109*                 Scheduled Meds:     amLODIPine 10 mg Oral Daily   atorvastatin 10 mg Oral Daily   carvedilol 12.5 mg Oral Daily   enoxaparin 40 mg Subcutaneous Daily   losartan 100 mg Oral Daily   polyethylene glycol 17 g Oral Daily "   sennosides-docusate sodium 2 tablet Oral BID     PRN Meds:   •  acetaminophen  •  bisacodyl  •  diphenhydrAMINE  •  HYDROcodone-acetaminophen  •  melatonin  •  ondansetron **OR** ondansetron ODT **OR** ondansetron  •  pneumococcal polysaccharide 23-valent  •  sodium chloride  Continuous Infusions:       Assessment/Plan   Principal Problem:    Closed right hip fracture  Active Problems:    Fall    HTN (hypertension)    PVC (premature ventricular contraction)      Assessment & Plan  - POD #6  - Add miralax for constipation.  If that powell snot produce a BM then will give enema  - xray of right foot negative for fracture.  Pain improved todat  - Ambulating with PT  - CCP working on rehab placement  - Blood pressure stable  - Lovenox for DVT proph  - Follow up with Dr. Alba in 2 weeks in office.   - D/C yolanda POD #14.     Demetris Mccoy MD  [facility]  07/07/17  9:13 AM

## 2017-07-07 NOTE — PLAN OF CARE
Problem: Patient Care Overview (Adult)  Goal: Plan of Care Review    07/07/17 0902   Coping/Psychosocial Response Interventions   Plan Of Care Reviewed With patient   Patient Care Overview   Progress improving   Outcome Evaluation   Outcome Summary/Follow up Plan minimal pain medication, more alert and awake, constipated, dulcolax suppository given with small results, educated of selfmanagement of hypertension and monitoring blood pressure at home       Goal: Adult Individualization and Mutuality  Outcome: Ongoing (interventions implemented as appropriate)  Goal: Discharge Needs Assessment  Outcome: Ongoing (interventions implemented as appropriate)    Problem: Orthopaedic Fracture (Adult)  Goal: Signs and Symptoms of Listed Potential Problems Will be Absent or Manageable (Orthopaedic Fracture)  Outcome: Ongoing (interventions implemented as appropriate)    Problem: Fall Risk (Adult)  Goal: Absence of Falls  Outcome: Ongoing (interventions implemented as appropriate)    Problem: Perioperative Period (Adult)  Goal: Signs and Symptoms of Listed Potential Problems Will be Absent or Manageable (Perioperative Period)  Outcome: Ongoing (interventions implemented as appropriate)

## 2017-07-07 NOTE — PLAN OF CARE
Problem: Patient Care Overview (Adult)  Goal: Plan of Care Review    07/07/17 1144   Coping/Psychosocial Response Interventions   Plan Of Care Reviewed With patient   Outcome Evaluation   Outcome Summary/Follow up Plan Pt. limited in gait distance this day due to increase in fatigue, weakness, and continued pain/soreness in his Right hip. Pt. requires x 4 standing rest breaks with mod. verbal/tactile cues for posture correction and to increase his bilateral step length.

## 2017-07-07 NOTE — PLAN OF CARE
Problem: Patient Care Overview (Adult)  Goal: Plan of Care Review  Outcome: Ongoing (interventions implemented as appropriate)    07/07/17 1540   Coping/Psychosocial Response Interventions   Plan Of Care Reviewed With patient   Patient Care Overview   Progress improving   Outcome Evaluation   Outcome Summary/Follow up Plan Miralax started pMD ordered fleets but pt refused. KUB 2 views done awaiting results. Pt had t2 small bms today. Reports passiing flatus. Instructed on BP monitoring and meds. Refused pain medd today.          Problem: Orthopaedic Fracture (Adult)  Goal: Signs and Symptoms of Listed Potential Problems Will be Absent or Manageable (Orthopaedic Fracture)  Outcome: Ongoing (interventions implemented as appropriate)    Problem: Fall Risk (Adult)  Goal: Identify Related Risk Factors and Signs and Symptoms  Outcome: Outcome(s) achieved Date Met:  07/07/17  Goal: Absence of Falls  Outcome: Ongoing (interventions implemented as appropriate)

## 2017-07-07 NOTE — THERAPY TREATMENT NOTE
"Acute Care - Physical Therapy Treatment Note  [facility]     Patient Name: Demetris Nye  : 1945  MRN: [id number]  Today's Date: 2017             Admit Date: 2017    Visit Dx:    ICD-10-CM ICD-9-CM   1. Fall, initial encounter W19.XXXA E888.9   2. Closed right hip fracture, initial encounter S72.001A 820.8   3. Impaired mobility Z74.09 799.89     Patient Active Problem List   Diagnosis   • Fall   • Closed right hip fracture   • HTN (hypertension)   • PVC (premature ventricular contraction)               Adult Rehabilitation Note       17 1139 17 1539 17 1100    Rehab Assessment/Intervention    Discipline physical therapist  -MS physical therapy assistant  -OSCAR physical therapy assistant  -RW    Document Type therapy note (daily note)  -MS therapy note (daily note)  -OSCAR therapy note (daily note)  -RW    Subjective Information agree to therapy;complains of;weakness;fatigue;pain  -MS agree to therapy  -OSCAR agree to therapy;complains of;pain  -RW    Patient Effort, Rehab Treatment good  -MS good  -OSCAR good  -RW    Symptoms Noted Comment Pt. reports feeling \"very tired\" this AM with continued pain/soreness in his Right L.E.  -MS      Precautions/Limitations fall precautions;hip precautions- right  -MS fall precautions;hip precautions- right  -OSCAR fall precautions;hip precautions- right  -RW    Recorded by [MS] Efra Arroyo, PT [OSCAR] Hang Johnson, MICAELA [RW] Amanda Ospina PTA    Vital Signs    Pre SpO2 (%)   93  -RW    O2 Delivery Pre Treatment   room air  -RW    Recorded by   [RW] Amanda Ospina PTA    Pain Assessment    Pain Assessment 0-10  -MS 0-10  -OSCAR 0-10  -RW    Pain Score 4  -MS 4  -OSCAR 6  -RW    Post Pain Score 4  -MS 4  -OSCAR     Pain Type Acute pain;Surgical pain  -MS Acute pain;Surgical pain  -OSCAR Acute pain  -RW    Pain Location Leg  -MS Leg  -OSCAR Foot  -RW    Pain Orientation Right  -MS Right  -OSCAR Right  -RW    Pain Intervention(s) Medication (See MAR);Cold " applied;Repositioned;Rest;Elevated  -MS Ambulation/increased activity;Repositioned;Rest  -OSCAR Medication (See MAR);Repositioned;Ambulation/increased activity  -RW    Response to Interventions   tolerated  -RW    Multiple Pain Sites   Yes  -RW    Recorded by [MS] Efra Arroyo, PT [OSCAR] Hang Johnson PTA [RW] Amanda Ospina PTA    Pain 2    Pain Score 2   6  -RW    Pain Type 2   Acute pain;Surgical pain  -RW    Pain Location 2   Groin  -RW    Pain Orientation 2   Right  -RW    Pain Intervention(s) 2   Medication (See MAR);Cold applied;Repositioned;Ambulation/increased activity  -RW    Response to Interventions 2   tolerated, unchanged  -RW    Recorded by   [RW] Amanda Ospina PTA    Cognitive Assessment/Intervention    Current Cognitive/Communication Assessment functional  -MS functional  -OSCAR functional  -RW    Orientation Status oriented x 4  -MS oriented x 4  -OSCAR oriented x 4  -RW    Follows Commands/Answers Questions 100% of the time  -% of the time;able to follow single-step instructions;needs cueing;needs increased time;needs repetition  -OSCAR 75% of the time;able to follow single-step instructions;needs cueing  -RW    Personal Safety WNL/WFL  -MS mild impairment;decreased awareness, need for assist;decreased awareness, need for safety;decreased insight to deficits  -OSCAR mild impairment;at risk behaviors demonstrated;decreased awareness, need for assist;decreased awareness, need for safety;decreased insight to deficits  -RW    Personal Safety Interventions fall prevention program maintained;gait belt;nonskid shoes/slippers when out of bed;supervised activity  -MS fall prevention program maintained;gait belt;nonskid shoes/slippers when out of bed  -OSCAR fall prevention program maintained;gait belt;nonskid shoes/slippers when out of bed  -RW    Recorded by [MS] Efra Arroyo, PT [OSCAR] Hang Johnson PTA [RW] Amanda Ospina PTA    Mobility Assessment/Training    Extremity Weight-Bearing Status right lower  extremity  -MS      Right Lower Extremity Weight-Bearing weight-bearing as tolerated  -MS      Recorded by [MS] Efra Arroyo, PT      Bed Mobility, Assessment/Treatment    Bed Mob, Supine to Sit, [place]  not tested  -OSCAR not tested  -RW    Bed Mob, Sit to Supine, [place]  not tested  -OSCAR not tested  -RW    Bed Mobility, Comment Pt. up in chair this AM.  -MS  Pt up in chair  -RW    Recorded by [MS] Efra Arroyo, PT [OSCAR] Hang Johnson PTA [RW] Amanda Ospina PTA    Transfer Assessment/Treatment    Transfers, Sit-Stand [place] minimum assist (75% patient effort);2 person assist required  -MS minimum assist (75% patient effort);nonverbal cues required (demo/gesture);verbal cues required  -OSCAR moderate assist (50% patient effort);verbal cues required;nonverbal cues required (demo/gesture)  -RW    Transfers, Stand-Sit [place] minimum assist (75% patient effort);2 person assist required  -MS minimum assist (75% patient effort);nonverbal cues required (demo/gesture);verbal cues required  -OSCAR moderate assist (50% patient effort);verbal cues required;nonverbal cues required (demo/gesture)  -RW    Transfers, Sit-Stand-Sit, Assist Device rolling walker  -MS rolling walker  -OSCAR rolling walker  -RW    Transfer, Impairments  strength decreased;impaired balance;pain  -OSCAR     Transfer, Comment   Very slow to come to stand. Verbal cueing required for hand placement and RLE placement  -RW    Recorded by [MS] Efra Arroyo, PT [OSCAR] Hang Johnson PTA [RW] Amanda Ospina PTA    Gait Assessment/Treatment    Gait, [place] Level minimum assist (75% patient effort)  -MS minimum assist (75% patient effort);verbal cues required;nonverbal cues required (demo/gesture)  -OSCAR minimum assist (75% patient effort);verbal cues required  -RW    Gait, Assistive Device rolling walker  -MS rolling walker  -OSCAR rolling walker  -RW    Gait, Distance (Feet) 75  -MS 75  -OSCAR 30  -RW    Gait, Gait Deviations  right:;antalgic;tamara decreased;decreased heel strike;forward flexed posture;step length decreased  -MS tamara decreased;decreased heel strike;forward flexed posture;limb motion velocity decreased;step length decreased;toe-to-floor clearance decreased;weight-shifting ability decreased  -OSCAR forward flexed posture;bilateral:;tamara decreased;right:;step length decreased;toe-to-floor clearance decreased;weight-shifting ability decreased  -RW    Gait, Safety Issues balance decreased during turns;step length decreased  -MS step length decreased;weight-shifting ability decreased;balance decreased during turns  -OSCAR sequencing ability decreased;step length decreased;weight-shifting ability decreased  -RW    Gait, Impairments  strength decreased;impaired balance;pain  -OSCAR strength decreased;impaired balance  -RW    Gait, Comment x 4 standing rest breaks due to overall fatigue, pain, weakness.  Pt. also requires mod. verbal/tactile cues for posture correction (as pt. tends to lean forward on walker when resting), as well as verbal cues to increase his bilateral heel strike (shuffled steps)  -MS Pt frequently cued for improved posture and (B)step length/height  -OSCAR Verbal cueing required for correct sequencing w/ gait. Pt required several standing rest breaks due to fatigue and pain. Further distance limited due to fatigue and pain  -RW    Recorded by [MS] Efra Arroyo, PT [OSCAR] Hang Johnson PTA [RW] Amanda Ospina PTA    Therapy Exercises    Exercise Protocols hip ORIF  -MS hip ORIF  -OSCAR hip ORIF  -RW    Hip ORIF Exercises right:;15 reps;completed protocol  -MS right:;15 reps;completed protocol  -OSCAR right:;15 reps;heel slides;hip abduction;LAQ  -RW    Recorded by [MS] Efra Arroyo, PT [OSCAR] Hang Johnson PTA [RW] Amanda Ospina PTA    Positioning and Restraints    Pre-Treatment Position sitting in chair/recliner  -MS sitting in chair/recliner  -OSCAR sitting in chair/recliner  -RW    Post Treatment Position chair   -MS chair  -OSCAR chair  -RW    In Chair notified nsg;reclined;sitting;call light within reach;encouraged to call for assist;with family/caregiver   Ice pack to Right hip  -MS sitting;call light within reach;encouraged to call for assist  -OSCAR reclined;call light within reach;encouraged to call for assist  -RW    Recorded by [MS] Efra TORRES Cruz, PT [OSCAR] Hang Johnson, PTA [RW] Amanda Ospina, PTA      User Key  (r) = Recorded By, (t) = Taken By, (c) = Cosigned By    Initials Name Effective Dates    MS Efra TORRES Cruz, PT 12/01/15 -     RW Amanda Ospina, PTA 04/06/16 -     OSCAR Hang Johnson, PTA 04/24/15 -                 IP PT Goals       07/01/17 1534          Bed Mobility PT LTG    Bed Mobility PT LTG, Date Established 07/01/17  -CK      Bed Mobility PT LTG, Time to Achieve 5 days  -CK      Bed Mobility PT LTG, Activity Type all bed mobility  -CK      Bed Mobility PT LTG, [place] Level minimum assist (75% patient effort)  -CK      Bed Mobility PT Goal  LTG, Assist Device bed rails  -CK      Transfer Training PT LTG    Transfer Training PT LTG, Date Established 07/01/17  -CK      Transfer Training PT LTG, Time to Achieve 5 days  -CK      Transfer Training PT LTG, Activity Type bed to chair /chair to bed  -CK      Transfer Training PT LTG, [place] Level minimum assist (75% patient effort)  -CK      Transfer Training PT LTG, Assist Device walker, rolling  -CK      Transfer Training 2 PT LTG    Transfer Training PT 2 LTG, Date Established 07/01/17  -CK      Transfer Training PT 2 LTG, Activity Type sit to stand/stand to sit  -CK      Transfer Training PT 2 LTG, [place] Level contact guard assist  -CK      Transfer Training PT 2 LTG, Assist Device walker, rolling  -CK      Gait Training PT LTG    Gait Training Goal PT LTG, Date Established 07/01/17  -CK      Gait Training Goal PT LTG, Time to Achieve 5 days  -CK      Gait Training Goal PT LTG, [place] Level minimum assist (75% patient effort)  -CK       Gait Training Goal PT LTG, Assist Device walker, rolling  -CK      Gait Training Goal PT LTG, Distance to Achieve 100 feet  -CK        User Key  (r) = Recorded By, (t) = Taken By, (c) = Cosigned By    Initials Name Provider Type    CK Jay Núeñz, PT Physical Therapist          Physical Therapy Education     Title: PT OT SLP Therapies (Done)     Topic: Physical Therapy (Done)     Point: Mobility training (Done)    Learning Progress Summary    Learner Readiness Method Response Comment Documented by Status   Patient Acceptance E,D VU,NR  MS 07/07/17 1144 Done    Acceptance E VU,NR   07/06/17 1606 Done    Acceptance E,TB,D VU,NR   07/05/17 1141 Done    Acceptance E VU   07/04/17 1000 Done    Acceptance E,D VU,NR  MS 07/03/17 1013 Done    Acceptance E,TB VU   07/02/17 1600 Done    Acceptance E VU Educated on WBAT status  07/01/17 1533 Done   Family Acceptance E,TB VU   07/02/17 1600 Done               Point: Home exercise program (Done)    Learning Progress Summary    Learner Readiness Method Response Comment Documented by Status   Patient Acceptance E,D VU,NR  MS 07/07/17 1144 Done    Acceptance E VU,NR   07/06/17 1606 Done    Acceptance E,TB,D VU,NR   07/05/17 1141 Done    Acceptance E,D VU,NR  MS 07/03/17 1013 Done    Acceptance E,TB VU   07/02/17 1600 Done    Acceptance E VU   07/02/17 0929 Done   Family Acceptance E,TB VU   07/02/17 1600 Done               Point: Body mechanics (Done)    Learning Progress Summary    Learner Readiness Method Response Comment Documented by Status   Patient Acceptance E,D VU,NR  MS 07/07/17 1144 Done    Acceptance E VU,NR   07/06/17 1606 Done    Acceptance E,TB,D VU,NR   07/05/17 1141 Done    Acceptance E,D VU,NR  MS 07/03/17 1013 Done               Point: Precautions (Done)    Learning Progress Summary    Learner Readiness Method Response Comment Documented by Status   Patient Acceptance E,D VU,NR  MS 07/07/17 1144 Done    Acceptance E VU,NR   07/06/17  1606 Done    Acceptance E,TB,D VU,NR  RW 07/05/17 1141 Done    Acceptance E,D VU,NR  MS 07/03/17 1013 Done    Acceptance E,TB VU  AC 07/02/17 1600 Done    Acceptance E VU Educated on WBAT status CK 07/01/17 1533 Done   Family Acceptance E,TB VU  AC 07/02/17 1600 Done                      User Key     Initials Effective Dates Name Provider Type Discipline     04/24/15 -  Jay Núñez, PT Physical Therapist PT     06/16/16 -  Babs Jordan, RN Registered Nurse Nurse    MS 12/01/15 -  Efra Arroyo, PT Physical Therapist PT     04/06/16 -  Amanda Ospina PTA Physical Therapy Assistant PT    OSCAR 04/24/15 -  Hang Johnson PTA Physical Therapy Assistant PT                    PT Recommendation and Plan  Anticipated Discharge Disposition: skilled nursing facility  PT Frequency: daily  Plan of Care Review  Plan Of Care Reviewed With: patient  Progress: progress towards functional goals is fair  Outcome Summary/Follow up Plan: Pt. limited in gait distance this day due to increase in fatigue, weakness, and continued pain/soreness in his Right hip.  Pt. requires x 4 standing rest breaks with mod. verbal/tactile cues for posture correction and to increase his bilateral step length.          Outcome Measures       07/07/17 1100 07/06/17 1600 07/05/17 1100    How much help from another person do you currently need...    Turning from your back to your side while in flat bed without using bedrails? 3  -MS 3  -OSCAR 3  -RW    Moving from lying on back to sitting on the side of a flat bed without bedrails? 3  -MS 3  -OSCAR 3  -RW    Moving to and from a bed to a chair (including a wheelchair)? 3  -MS 3  -OSCAR 3  -RW    Standing up from a chair using your arms (e.g., wheelchair, bedside chair)? 3  -MS 3  -OSCAR 2  -RW    Climbing 3-5 steps with a railing? 2  -MS 2  -OSCAR 2  -RW    To walk in hospital room? 3  -MS 3  -OSCAR 3  -RW    AM-PAC 6 Clicks Score 17  -MS 17  -OSCAR 16  -RW    Functional Assessment    Outcome Measure Options AM-PAC  6 Clicks Basic Mobility (PT)  -MS AM-PAC 6 Clicks Basic Mobility (PT)  -OSCAR AM-PAC 6 Clicks Basic Mobility (PT)  -RW      User Key  (r) = Recorded By, (t) = Taken By, (c) = Cosigned By    Initials Name Provider Type    MS Efra Arroyo, PT Physical Therapist    RW Amanda Ospina, PTA Physical Therapy Assistant    OSCAR Johnson, PTA Physical Therapy Assistant           Time Calculation:         PT Charges       07/07/17 1146          Time Calculation    Start Time 1124  -MS      Stop Time 1139  -MS      Time Calculation (min) 15 min  -MS      PT Received On 07/07/17  -MS      PT - Next Appointment 07/08/17  -MS        User Key  (r) = Recorded By, (t) = Taken By, (c) = Cosigned By    Initials Name Provider Type    MS Efra Arroyo, PT Physical Therapist          Therapy Charges for Today     Code Description Service Date Service Provider Modifiers Qty    45188749135 HC PT THER PROC EA 15 MIN 7/7/2017 Efra Arroyo, PT GP 1    09401330000 HC PT THER SUPP EA 15 MIN 7/7/2017 Efra Arroyo, PT GP 1          PT G-Codes  Outcome Measure Options: AM-PAC 6 Clicks Basic Mobility (PT)    Efra Arroyo, PT  7/7/2017

## 2017-07-07 NOTE — NURSING NOTE
Referral received per Mavis ([facility]) earlier this afternoon.  Discussed with Rehab Med. Director, [facility] , Fbay Gaitan.  Not appropriate for acute rehab with the current Worker's Comp. plan.   Message left for Mavis to follow up earlier discussion re: state insurance commission.  Thank you--Beatris Schaffer,  Rehab Admission Nurse

## 2017-07-08 PROCEDURE — 25010000002 ENOXAPARIN PER 10 MG: Performed by: ORTHOPAEDIC SURGERY

## 2017-07-08 PROCEDURE — 97110 THERAPEUTIC EXERCISES: CPT

## 2017-07-08 RX ORDER — KETOROLAC TROMETHAMINE 10 MG/1
10 TABLET, FILM COATED ORAL EVERY 6 HOURS PRN
Status: DISCONTINUED | OUTPATIENT
Start: 2017-07-08 | End: 2017-07-09

## 2017-07-08 RX ORDER — SIMETHICONE 80 MG
80 TABLET,CHEWABLE ORAL
Status: DISCONTINUED | OUTPATIENT
Start: 2017-07-08 | End: 2017-07-14 | Stop reason: HOSPADM

## 2017-07-08 RX ADMIN — SENNA AND DOCUSATE SODIUM 2 TABLET: 50; 8.6 TABLET, FILM COATED ORAL at 09:29

## 2017-07-08 RX ADMIN — HYDROCODONE BITARTRATE AND ACETAMINOPHEN 1 TABLET: 7.5; 325 TABLET ORAL at 07:51

## 2017-07-08 RX ADMIN — ATORVASTATIN CALCIUM 10 MG: 10 TABLET, FILM COATED ORAL at 09:29

## 2017-07-08 RX ADMIN — Medication 250 MG: at 09:30

## 2017-07-08 RX ADMIN — SIMETHICONE CHEW TAB 80 MG 80 MG: 80 TABLET ORAL at 17:20

## 2017-07-08 RX ADMIN — LOSARTAN POTASSIUM 100 MG: 50 TABLET, FILM COATED ORAL at 09:29

## 2017-07-08 RX ADMIN — Medication 250 MG: at 17:20

## 2017-07-08 RX ADMIN — POLYETHYLENE GLYCOL 3350 17 G: 17 POWDER, FOR SOLUTION ORAL at 09:30

## 2017-07-08 RX ADMIN — AMLODIPINE BESYLATE 10 MG: 10 TABLET ORAL at 09:29

## 2017-07-08 RX ADMIN — SIMETHICONE CHEW TAB 80 MG 80 MG: 80 TABLET ORAL at 21:38

## 2017-07-08 RX ADMIN — SENNA AND DOCUSATE SODIUM 2 TABLET: 50; 8.6 TABLET, FILM COATED ORAL at 17:20

## 2017-07-08 RX ADMIN — ENOXAPARIN SODIUM 40 MG: 40 INJECTION SUBCUTANEOUS at 09:30

## 2017-07-08 RX ADMIN — CARVEDILOL 12.5 MG: 12.5 TABLET, FILM COATED ORAL at 09:29

## 2017-07-08 NOTE — PLAN OF CARE
Problem: Patient Care Overview (Adult)  Goal: Plan of Care Review  Outcome: Ongoing (interventions implemented as appropriate)    07/08/17 3517   Coping/Psychosocial Response Interventions   Plan Of Care Reviewed With patient   Patient Care Overview   Progress improving   Outcome Evaluation   Outcome Summary/Follow up Plan Pt instructed on the causes/risk of ilelus with use of Opoids and decreased ambulation. Bowel sound better than yesterday but tympanic. Abd laarge and distended. Reports passing gas. BM early am after enema. Pain med change to toradol po. VSS instructed on need for BP monitoring and medication. BP in normal range this shift.         Problem: Orthopaedic Fracture (Adult)  Goal: Signs and Symptoms of Listed Potential Problems Will be Absent or Manageable (Orthopaedic Fracture)  Outcome: Ongoing (interventions implemented as appropriate)    Problem: Fall Risk (Adult)  Goal: Absence of Falls  Outcome: Ongoing (interventions implemented as appropriate)

## 2017-07-08 NOTE — PLAN OF CARE
Problem: Patient Care Overview (Adult)  Goal: Plan of Care Review  Outcome: Ongoing (interventions implemented as appropriate)    07/08/17 1031   Coping/Psychosocial Response Interventions   Plan Of Care Reviewed With patient   Patient Care Overview   Progress progress toward functional goals as expected   Outcome Evaluation   Outcome Summary/Follow up Plan Pt expressed increased abdominal pain throughout treatment due to bowels. Required encouragement and education on ambulating to help with bowels this date. Once standing, pt reported feeling light-headed but agreed to ambulate in room. Pt attempted having BM on toilet, no success. Ended treatment reclined in chair with nsg present.

## 2017-07-08 NOTE — THERAPY TREATMENT NOTE
"Acute Care - Physical Therapy Treatment Note  [facility]     Patient Name: Demertis Nye  : 1945  MRN: [id number]  Today's Date: 2017             Admit Date: 2017    Visit Dx:    ICD-10-CM ICD-9-CM   1. Fall, initial encounter W19.XXXA E888.9   2. Closed right hip fracture, initial encounter S72.001A 820.8   3. Impaired mobility Z74.09 799.89     Patient Active Problem List   Diagnosis   • Fall   • Closed right hip fracture   • HTN (hypertension)   • PVC (premature ventricular contraction)               Adult Rehabilitation Note       17 0933 17 1139 17 1539    Rehab Assessment/Intervention    Discipline physical therapy assistant  -SD physical therapist  -MS physical therapy assistant  -OSCAR    Document Type therapy note (daily note)  -SD therapy note (daily note)  -MS therapy note (daily note)  -OSCAR    Subjective Information agree to therapy;complains of;pain;dizziness  -SD agree to therapy;complains of;weakness;fatigue;pain  -MS agree to therapy  -OSCAR    Patient Effort, Rehab Treatment good  -SD good  -MS good  -OSCAR    Symptoms Noted Comment pt reports feeling light-headed and abdominal pain  -SD Pt. reports feeling \"very tired\" this AM with continued pain/soreness in his Right L.E.  -MS     Precautions/Limitations fall precautions;hip precautions- right  -SD fall precautions;hip precautions- right  -MS fall precautions;hip precautions- right  -OSCAR    Recorded by [SD] Diane Ross, PTA [MS] Efra Arroyo, PT [OSCAR] Hang Johnson PTA    Pain Assessment    Pain Assessment Mas-Roa FACES  -SD 0-10  -MS 0-10  -OSCAR    Mas-Roa FACES Pain Rating 6  -SD      Pain Score  4  -MS 4  -OSCAR    Post Pain Score  4  -MS 4  -OSCAR    Pain Type Acute pain  -SD Acute pain;Surgical pain  -MS Acute pain;Surgical pain  -OSCAR    Pain Location Abdomen  -SD Leg  -MS Leg  -OSCAR    Pain Orientation  Right  -MS Right  -OSCAR    Pain Intervention(s) Repositioned;Ambulation/increased activity;Rest  -SD " Medication (See MAR);Cold applied;Repositioned;Rest;Elevated  -MS Ambulation/increased activity;Repositioned;Rest  -OSCAR    Recorded by [SD] Diane Ross PTA [MS] Efra Arroyo, PT [OSCAR] Hang Johnson PTA    Cognitive Assessment/Intervention    Current Cognitive/Communication Assessment functional  -SD functional  -MS functional  -OSCAR    Orientation Status oriented x 4  -SD oriented x 4  -MS oriented x 4  -OSCAR    Follows Commands/Answers Questions 100% of the time  -% of the time  -% of the time;able to follow single-step instructions;needs cueing;needs increased time;needs repetition  -OSCAR    Personal Safety WNL/WFL  -SD WNL/WFL  -MS mild impairment;decreased awareness, need for assist;decreased awareness, need for safety;decreased insight to deficits  -OSCAR    Personal Safety Interventions fall prevention program maintained;gait belt;nonskid shoes/slippers when out of bed  -SD fall prevention program maintained;gait belt;nonskid shoes/slippers when out of bed;supervised activity  -MS fall prevention program maintained;gait belt;nonskid shoes/slippers when out of bed  -OSCAR    Recorded by [SD] Diane Ross PTA [MS] Efra Arroyo, PT [OSCAR] Hang Johnson PTA    Mobility Assessment/Training    Extremity Weight-Bearing Status  right lower extremity  -MS     Right Lower Extremity Weight-Bearing  weight-bearing as tolerated  -MS     Recorded by  [MS] Efra Arroyo PT     Bed Mobility, Assessment/Treatment    Bed Mob, Supine to Sit, [place]   not tested  -OSCAR    Bed Mob, Sit to Supine, [place] not tested  -SD  not tested  -OSCAR    Bed Mobility, Comment up in chair  -SD Pt. up in chair this AM.  -MS     Recorded by [SD] Diane Ross PTA [MS] Efra Arroyo PT [OSCAR] Hang Johnson PTA    Transfer Assessment/Treatment    Transfers, Sit-Stand [place] minimum assist (75% patient effort);2 person assist required;verbal cues required  -SD minimum assist (75% patient effort);2 person  assist required  -MS minimum assist (75% patient effort);nonverbal cues required (demo/gesture);verbal cues required  -OSCAR    Transfers, Stand-Sit [place] minimum assist (75% patient effort);2 person assist required;verbal cues required  -SD minimum assist (75% patient effort);2 person assist required  -MS minimum assist (75% patient effort);nonverbal cues required (demo/gesture);verbal cues required  -OSCAR    Transfers, Sit-Stand-Sit, Assist Device rolling walker  -SD rolling walker  -MS rolling walker  -OSCAR    Toilet Transfer, [place] minimum assist (75% patient effort);2 person assist required;verbal cues required  -SD      Toilet Transfer, Assistive Device rolling walker  -SD      Transfer, Impairments ROM decreased;strength decreased;pain  -SD  strength decreased;impaired balance;pain  -OSCAR    Recorded by [SD] Diane Ross, PTA [MS] Efra Arroyo, PT [OSCAR] Hang Johnson PTA    Gait Assessment/Treatment    Gait, [place] Level minimum assist (75% patient effort);2 person assist required;verbal cues required  -SD minimum assist (75% patient effort)  -MS minimum assist (75% patient effort);verbal cues required;nonverbal cues required (demo/gesture)  -OSCAR    Gait, Assistive Device rolling walker  -SD rolling walker  -MS rolling walker  -OSCAR    Gait, Distance (Feet) 30  -SD 75  -MS 75  -OSCAR    Gait, Gait Deviations antalgic;tamara decreased;decreased heel strike;forward flexed posture;step length decreased  -SD right:;antalgic;tamara decreased;decreased heel strike;forward flexed posture;step length decreased  -MS tamara decreased;decreased heel strike;forward flexed posture;limb motion velocity decreased;step length decreased;toe-to-floor clearance decreased;weight-shifting ability decreased  -OSCAR    Gait, Safety Issues step length decreased  -SD balance decreased during turns;step length decreased  -MS step length decreased;weight-shifting ability decreased;balance decreased during turns  -OSCAR     Gait, Impairments ROM decreased;strength decreased;pain  -SD  strength decreased;impaired balance;pain  -OSCAR    Gait, Comment x2 standing rest breaks leaning forward over walker due to abdominal pain  -SD x 4 standing rest breaks due to overall fatigue, pain, weakness.  Pt. also requires mod. verbal/tactile cues for posture correction (as pt. tends to lean forward on walker when resting), as well as verbal cues to increase his bilateral heel strike (shuffled steps)  -MS Pt frequently cued for improved posture and (B)step length/height  -OSCAR    Recorded by [SD] Diane Ross PTA [MS] Efra Arroyo, PT [OSCAR] Hang Johnson PTA    Therapy Exercises    Exercise Protocols  hip ORIF  -MS hip ORIF  -OSCAR    Hip ORIF Exercises  right:;15 reps;completed protocol  -MS right:;15 reps;completed protocol  -OSCAR    Recorded by  [MS] Efra Arroyo, PT [OSCAR] Hang Johnson PTA    Positioning and Restraints    Pre-Treatment Position sitting in chair/recliner  -SD sitting in chair/recliner  -MS sitting in chair/recliner  -OSCAR    Post Treatment Position chair  -SD chair  -MS chair  -OSCAR    In Chair reclined;call light within reach;encouraged to call for assist;with nsg  -SD notified nsg;reclined;sitting;call light within reach;encouraged to call for assist;with family/caregiver   Ice pack to Right hip  -MS sitting;call light within reach;encouraged to call for assist  -OSCAR    Recorded by [SD] Diane Ross PTA [MS] Efra Arroyo, PT [OSCAR] Hang Jhonson PTA      07/05/17 1100          Rehab Assessment/Intervention    Discipline physical therapy assistant  -RW      Document Type therapy note (daily note)  -RW      Subjective Information agree to therapy;complains of;pain  -RW      Patient Effort, Rehab Treatment good  -RW      Precautions/Limitations fall precautions;hip precautions- right  -RW      Recorded by [RW] Amanda Ospina PTA      Vital Signs    Pre SpO2 (%) 93  -RW      O2 Delivery Pre Treatment room air  -RW       Recorded by [RW] Amanda Ospina PTA      Pain Assessment    Pain Assessment 0-10  -RW      Pain Score 6  -RW      Pain Type Acute pain  -RW      Pain Location Foot  -RW      Pain Orientation Right  -RW      Pain Intervention(s) Medication (See MAR);Repositioned;Ambulation/increased activity  -RW      Response to Interventions tolerated  -RW      Multiple Pain Sites Yes  -RW      Recorded by [RW] Amanda Ospina PTA      Pain 2    Pain Score 2 6  -RW      Pain Type 2 Acute pain;Surgical pain  -RW      Pain Location 2 Groin  -RW      Pain Orientation 2 Right  -RW      Pain Intervention(s) 2 Medication (See MAR);Cold applied;Repositioned;Ambulation/increased activity  -RW      Response to Interventions 2 tolerated, unchanged  -RW      Recorded by [RW] Amanda Ospina PTA      Cognitive Assessment/Intervention    Current Cognitive/Communication Assessment functional  -RW      Orientation Status oriented x 4  -RW      Follows Commands/Answers Questions 75% of the time;able to follow single-step instructions;needs cueing  -RW      Personal Safety mild impairment;at risk behaviors demonstrated;decreased awareness, need for assist;decreased awareness, need for safety;decreased insight to deficits  -RW      Personal Safety Interventions fall prevention program maintained;gait belt;nonskid shoes/slippers when out of bed  -RW      Recorded by [RW] Amanda Ospina PTA      Bed Mobility, Assessment/Treatment    Bed Mob, Supine to Sit, [place] not tested  -RW      Bed Mob, Sit to Supine, [place] not tested  -RW      Bed Mobility, Comment Pt up in chair  -RW      Recorded by [RW] Amanda Ospina PTA      Transfer Assessment/Treatment    Transfers, Sit-Stand [place] moderate assist (50% patient effort);verbal cues required;nonverbal cues required (demo/gesture)  -RW      Transfers, Stand-Sit [place] moderate assist (50% patient effort);verbal cues required;nonverbal cues required (demo/gesture)  -RW       Transfers, Sit-Stand-Sit, Assist Device rolling walker  -RW      Transfer, Comment Very slow to come to stand. Verbal cueing required for hand placement and RLE placement  -RW      Recorded by [RW] Amanda Ospina PTA      Gait Assessment/Treatment    Gait, [place] minimum assist (75% patient effort);verbal cues required  -RW      Gait, Assistive Device rolling walker  -RW      Gait, Distance (Feet) 30  -RW      Gait, Gait Deviations forward flexed posture;bilateral:;tamara decreased;right:;step length decreased;toe-to-floor clearance decreased;weight-shifting ability decreased  -RW      Gait, Safety Issues sequencing ability decreased;step length decreased;weight-shifting ability decreased  -RW      Gait, Impairments strength decreased;impaired balance  -RW      Gait, Comment Verbal cueing required for correct sequencing w/ gait. Pt required several standing rest breaks due to fatigue and pain. Further distance limited due to fatigue and pain  -RW      Recorded by [RW] Amanda Ospina PTA      Therapy Exercises    Exercise Protocols hip ORIF  -RW      Hip ORIF Exercises right:;15 reps;heel slides;hip abduction;LAQ  -RW      Recorded by [RW] Amanda Ospina PTA      Positioning and Restraints    Pre-Treatment Position sitting in chair/recliner  -RW      Post Treatment Position chair  -RW      In Chair reclined;call light within reach;encouraged to call for assist  -RW      Recorded by [RW] Amanda Ospina PTA        User Key  (r) = Recorded By, (t) = Taken By, (c) = Cosigned By    Initials Name Effective Dates    MS Efra Arroyo, PT 12/01/15 -     SD Diane Ross, PTA 01/27/16 -     RW Amanda Ospina, PTA 04/06/16 -     OSCAR Johnson, PTA 04/24/15 -                 IP PT Goals       07/01/17 1534          Bed Mobility PT LTG    Bed Mobility PT LTG, Date Established 07/01/17  -CK      Bed Mobility PT LTG, Time to Achieve 5 days  -CK      Bed Mobility PT LTG, Activity Type all bed mobility   -CK      Bed Mobility PT LTG, [place] Level minimum assist (75% patient effort)  -CK      Bed Mobility PT Goal  LTG, Assist Device bed rails  -CK      Transfer Training PT LTG    Transfer Training PT LTG, Date Established 07/01/17  -CK      Transfer Training PT LTG, Time to Achieve 5 days  -CK      Transfer Training PT LTG, Activity Type bed to chair /chair to bed  -CK      Transfer Training PT LTG, [place] Level minimum assist (75% patient effort)  -CK      Transfer Training PT LTG, Assist Device walker, rolling  -CK      Transfer Training 2 PT LTG    Transfer Training PT 2 LTG, Date Established 07/01/17  -CK      Transfer Training PT 2 LTG, Activity Type sit to stand/stand to sit  -CK      Transfer Training PT 2 LTG, [place] Level contact guard assist  -CK      Transfer Training PT 2 LTG, Assist Device walker, rolling  -CK      Gait Training PT LTG    Gait Training Goal PT LTG, Date Established 07/01/17  -CK      Gait Training Goal PT LTG, Time to Achieve 5 days  -CK      Gait Training Goal PT LTG, [place] Level minimum assist (75% patient effort)  -CK      Gait Training Goal PT LTG, Assist Device walker, rolling  -CK      Gait Training Goal PT LTG, Distance to Achieve 100 feet  -CK        User Key  (r) = Recorded By, (t) = Taken By, (c) = Cosigned By    Initials Name Provider Type    MARILOU Núñez PT Physical Therapist          Physical Therapy Education     Title: PT OT SLP Therapies (Done)     Topic: Physical Therapy (Done)     Point: Mobility training (Done)    Learning Progress Summary    Learner Readiness Method Response Comment Documented by Status   Patient Acceptance CT LOPEZ NR  SD 07/08/17 1030 Done    Acceptance EELLEN VUMARGA  MS 07/07/17 1144 Done    Acceptance E VUMARGA  OSCAR 07/06/17 1606 Done    Acceptance CT LOPEZ D VU, NR  RW 07/05/17 1141 Done    Acceptance E VU  CK 07/04/17 1000 Done    Acceptance ELLEN LOPEZ NR  MS 07/03/17 1013 Done    Acceptance CT LOPEZ  AC 07/02/17 1600 Done     Acceptance E VU Educated on WBAT status  07/01/17 1533 Done   Family Acceptance E,TB VU   07/02/17 1600 Done               Point: Home exercise program (Done)    Learning Progress Summary    Learner Readiness Method Response Comment Documented by Status   Patient Acceptance E,TB VU,NR  SD 07/08/17 1030 Done    Acceptance E,D VU,NR  MS 07/07/17 1144 Done    Acceptance E VU,NR   07/06/17 1606 Done    Acceptance E,TB,D VU,NR   07/05/17 1141 Done    Acceptance E,D VU,NR  MS 07/03/17 1013 Done    Acceptance E,TB VU   07/02/17 1600 Done    Acceptance E VU   07/02/17 0929 Done   Family Acceptance E,TB VU   07/02/17 1600 Done               Point: Body mechanics (Done)    Learning Progress Summary    Learner Readiness Method Response Comment Documented by Status   Patient Acceptance E,TB VU,NR  SD 07/08/17 1030 Done    Acceptance E,D VU,NR  MS 07/07/17 1144 Done    Acceptance E VU,NR   07/06/17 1606 Done    Acceptance E,TB,D VU,NR   07/05/17 1141 Done    Acceptance E,D VU,NR  MS 07/03/17 1013 Done               Point: Precautions (Done)    Learning Progress Summary    Learner Readiness Method Response Comment Documented by Status   Patient Acceptance E,TB VU,NR  SD 07/08/17 1030 Done    Acceptance E,D VU,NR  MS 07/07/17 1144 Done    Acceptance E VU,NR   07/06/17 1606 Done    Acceptance E,TB,D VU,NR   07/05/17 1141 Done    Acceptance E,D VU,NR  MS 07/03/17 1013 Done    Acceptance E,TB VU   07/02/17 1600 Done    Acceptance E VU Educated on WBAT status  07/01/17 1533 Done   Family Acceptance E,TB VU   07/02/17 1600 Done                      User Key     Initials Effective Dates Name Provider Type Discipline     04/24/15 -  Jay Núñez, PT Physical Therapist PT     06/16/16 -  Babs Jordan, RN Registered Nurse Nurse    MS 12/01/15 -  Efra Arroyo, PT Physical Therapist PT    SD 01/27/16 -  Diane Ross, PTA Technician PT     04/06/16 -  Amanda Ospina, PTA Physical Therapy  Assistant PT    OSCAR 04/24/15 -  Hang Johnson PTA Physical Therapy Assistant PT                    PT Recommendation and Plan  Anticipated Discharge Disposition: skilled nursing facility  PT Frequency: daily  Plan of Care Review  Plan Of Care Reviewed With: patient  Progress: progress toward functional goals as expected  Outcome Summary/Follow up Plan: Pt expressed increased abdominal pain throughout treatment due to bowels. Required encouragement and education on ambulating to help with bowels this date. Once standing, pt reported feeling light-headed but agreed to ambulate in room. Pt attempted having BM on toilet, no success. Ended treatment reclined in chair with nsg present.          Outcome Measures       07/08/17 1000 07/07/17 1100 07/06/17 1600    How much help from another person do you currently need...    Turning from your back to your side while in flat bed without using bedrails? 3  -SD 3  -MS 3  -OSCAR    Moving from lying on back to sitting on the side of a flat bed without bedrails? 3  -SD 3  -MS 3  -OSCAR    Moving to and from a bed to a chair (including a wheelchair)? 3  -SD 3  -MS 3  -OSCAR    Standing up from a chair using your arms (e.g., wheelchair, bedside chair)? 3  -SD 3  -MS 3  -OSCAR    Climbing 3-5 steps with a railing? 2  -SD 2  -MS 2  -OSCAR    To walk in hospital room? 3  -SD 3  -MS 3  -OSCAR    AM-PAC 6 Clicks Score 17  -SD 17  -MS 17  -OSCAR    Functional Assessment    Outcome Measure Options AM-PAC 6 Clicks Basic Mobility (PT)  -SD AM-PAC 6 Clicks Basic Mobility (PT)  -MS AM-PAC 6 Clicks Basic Mobility (PT)  -OSCAR      07/05/17 1100          How much help from another person do you currently need...    Turning from your back to your side while in flat bed without using bedrails? 3  -RW      Moving from lying on back to sitting on the side of a flat bed without bedrails? 3  -RW      Moving to and from a bed to a chair (including a wheelchair)? 3  -RW      Standing up from a chair using your arms (e.g.,  wheelchair, bedside chair)? 2  -RW      Climbing 3-5 steps with a railing? 2  -RW      To walk in hospital room? 3  -RW      AM-PAC 6 Clicks Score 16  -RW      Functional Assessment    Outcome Measure Options AM-PAC 6 Clicks Basic Mobility (PT)  -RW        User Key  (r) = Recorded By, (t) = Taken By, (c) = Cosigned By    Initials Name Provider Type    MS Efra Arroyo, PT Physical Therapist    JENAE Ross, PTA Technician    GIORGI Ospina, PTA Physical Therapy Assistant    OSCAR Johnson, PTA Physical Therapy Assistant           Time Calculation:         PT Charges       07/08/17 1036          Time Calculation    Start Time 0933  -SD      Stop Time 0954  -SD      Time Calculation (min) 21 min  -SD      PT Received On 07/08/17  -SD      PT - Next Appointment 07/09/17  -SD        User Key  (r) = Recorded By, (t) = Taken By, (c) = Cosigned By    Initials Name Provider Type    JENAE Ross, MICAELA Technician          Therapy Charges for Today     Code Description Service Date Service Provider Modifiers Qty    58665299368 HC PT THER PROC EA 15 MIN 7/8/2017 Diane Ross PTA GP 1    96325468506 HC PT THER SUPP EA 15 MIN 7/8/2017 Diane Ross PTA GP 1          PT G-Codes  Outcome Measure Options: AM-PAC 6 Clicks Basic Mobility (PT)    Diane Ross PTA  7/8/2017

## 2017-07-08 NOTE — PROGRESS NOTES
" LOS: 8 days     Name: Demetris Nye  Age: 71 y.o.  Sex: male  :  1945  MRN: [id number]         Primary Care Physician: Rian Mofrin MD    Subjective   Subjective  C/o mild nausea but no vomiting.  Passing gas.  Had a moderate bowel movement after fleet enema last night but nothing today.  Mild to moderate abdominal pain at times    Objective   Vital Signs  Temp:  [97.7 °F (36.5 °C)-99 °F (37.2 °C)] 97.7 °F (36.5 °C)  Heart Rate:  [67-80] 78  Resp:  [16-18] 18  BP: ()/(50-63) 105/63  Body mass index is 35.67 kg/(m^2).    Objective:  General Appearance:  Comfortable and in no acute distress.    Vital signs: (most recent): Blood pressure 105/63, pulse 78, temperature 97.7 °F (36.5 °C), temperature source Oral, resp. rate 18, height 72\" (182.9 cm), weight 263 lb (119 kg), SpO2 90 %.    Lungs:  Normal respiratory rate and normal effort.    Heart: Normal rate.  Regular rhythm.    Abdomen: Abdomen is soft and distended.  Hypoactive and tinkling bowel sounds.   There is generalized tenderness.  (Mild to moderate tenderness).     Extremities: There is no local swelling or dependent edema.    Neurological: Patient is alert and oriented to person, place and time.    Skin:  Warm and dry.              Results Review:       I reviewed the patient's new clinical results.      Results from last 7 days  Lab Units 17  0442 17  1259 17  0345   HEMOGLOBIN g/dL  --  14.1 14.1   PLATELETS 10*3/mm3 203  --   --              Scheduled Meds:     amLODIPine 10 mg Oral Daily   atorvastatin 10 mg Oral Daily   bisacodyl 10 mg Rectal Daily   carvedilol 12.5 mg Oral Daily   enoxaparin 40 mg Subcutaneous Daily   losartan 100 mg Oral Daily   polyethylene glycol 17 g Oral Daily   saccharomyces boulardii 250 mg Oral BID   sennosides-docusate sodium 2 tablet Oral BID     PRN Meds:   •  acetaminophen  •  diphenhydrAMINE  •  ketorolac  •  melatonin  •  ondansetron **OR** ondansetron ODT **OR** ondansetron  • "  pneumococcal polysaccharide 23-valent  •  sodium chloride  Continuous Infusions:       Assessment/Plan   Principal Problem:    Closed right hip fracture  Active Problems:    Fall    HTN (hypertension)    PVC (premature ventricular contraction)      Assessment & Plan    - I think he has developed a mild post-op ileus.  Will change him to clear liquid diet.  Continue bowel regimen from above and below.  Stop opiate pain medications.  Add simethicone for gas.  Ambulate more today.  Repeat KUB in AM.  No vomiting.  Check electrolytes  - xray of right foot negative for fracture. Pain improved.  - CCP working on rehab placement, getting denied from all facilities due to insurance.  Home with home health may be the only option for him.  - Blood pressure stable  - Lovenox for DVT proph  - Follow up with Dr. Alba 2 weeks post op   - D/C staples POD #14.     Demetris Mccoy MD  [facility]  07/08/17  1:33 PM

## 2017-07-08 NOTE — PLAN OF CARE
Problem: Patient Care Overview (Adult)  Goal: Plan of Care Review  Outcome: Ongoing (interventions implemented as appropriate)    07/08/17 0557   Coping/Psychosocial Response Interventions   Plan Of Care Reviewed With patient   Patient Care Overview   Progress improving   Outcome Evaluation   Outcome Summary/Follow up Plan patient did allow the enema to be given tonight, moderate BM, stool softener and probiotic also given, passing gas, no complaints of nausea or hip pain, to rehab soon       Goal: Adult Individualization and Mutuality  Outcome: Ongoing (interventions implemented as appropriate)  Goal: Discharge Needs Assessment  Outcome: Ongoing (interventions implemented as appropriate)    Problem: Orthopaedic Fracture (Adult)  Goal: Signs and Symptoms of Listed Potential Problems Will be Absent or Manageable (Orthopaedic Fracture)  Outcome: Ongoing (interventions implemented as appropriate)    Problem: Fall Risk (Adult)  Goal: Absence of Falls  Outcome: Ongoing (interventions implemented as appropriate)

## 2017-07-09 ENCOUNTER — APPOINTMENT (OUTPATIENT)
Dept: CT IMAGING | Facility: HOSPITAL | Age: 72
End: 2017-07-09

## 2017-07-09 ENCOUNTER — APPOINTMENT (OUTPATIENT)
Dept: GENERAL RADIOLOGY | Facility: HOSPITAL | Age: 72
End: 2017-07-09

## 2017-07-09 PROBLEM — N17.9 AKI (ACUTE KIDNEY INJURY) (HCC): Status: ACTIVE | Noted: 2017-07-09

## 2017-07-09 PROBLEM — E87.1 HYPONATREMIA: Status: ACTIVE | Noted: 2017-07-09

## 2017-07-09 PROBLEM — K56.7 ILEUS, POSTOPERATIVE (HCC): Status: ACTIVE | Noted: 2017-07-09

## 2017-07-09 PROBLEM — K91.89 ILEUS, POSTOPERATIVE (HCC): Status: ACTIVE | Noted: 2017-07-09

## 2017-07-09 LAB
ANION GAP SERPL CALCULATED.3IONS-SCNC: 18.1 MMOL/L
BUN BLD-MCNC: 81 MG/DL (ref 8–23)
BUN/CREAT SERPL: 43.5 (ref 7–25)
CALCIUM SPEC-SCNC: 8.6 MG/DL (ref 8.6–10.5)
CHLORIDE SERPL-SCNC: 93 MMOL/L (ref 98–107)
CO2 SERPL-SCNC: 19.9 MMOL/L (ref 22–29)
CREAT BLD-MCNC: 1.86 MG/DL (ref 0.76–1.27)
GFR SERPL CREATININE-BSD FRML MDRD: 36 ML/MIN/1.73
GLUCOSE BLD-MCNC: 119 MG/DL (ref 65–99)
MAGNESIUM SERPL-MCNC: 3 MG/DL (ref 1.6–2.4)
PHOSPHATE SERPL-MCNC: 4.2 MG/DL (ref 2.5–4.5)
POTASSIUM BLD-SCNC: 3.8 MMOL/L (ref 3.5–5.2)
SODIUM BLD-SCNC: 131 MMOL/L (ref 136–145)

## 2017-07-09 PROCEDURE — 97110 THERAPEUTIC EXERCISES: CPT

## 2017-07-09 PROCEDURE — 25010000002 ENOXAPARIN PER 10 MG: Performed by: ORTHOPAEDIC SURGERY

## 2017-07-09 PROCEDURE — 74176 CT ABD & PELVIS W/O CONTRAST: CPT

## 2017-07-09 PROCEDURE — 83735 ASSAY OF MAGNESIUM: CPT | Performed by: INTERNAL MEDICINE

## 2017-07-09 PROCEDURE — 84100 ASSAY OF PHOSPHORUS: CPT | Performed by: INTERNAL MEDICINE

## 2017-07-09 PROCEDURE — 94799 UNLISTED PULMONARY SVC/PX: CPT

## 2017-07-09 PROCEDURE — 25010000002 LEVOFLOXACIN PER 250 MG: Performed by: INTERNAL MEDICINE

## 2017-07-09 PROCEDURE — 80048 BASIC METABOLIC PNL TOTAL CA: CPT | Performed by: INTERNAL MEDICINE

## 2017-07-09 PROCEDURE — 74000 HC ABDOMEN KUB: CPT

## 2017-07-09 RX ORDER — LEVOFLOXACIN 5 MG/ML
750 INJECTION, SOLUTION INTRAVENOUS EVERY 24 HOURS
Status: DISCONTINUED | OUTPATIENT
Start: 2017-07-09 | End: 2017-07-10

## 2017-07-09 RX ORDER — SODIUM CHLORIDE 9 MG/ML
50 INJECTION, SOLUTION INTRAVENOUS CONTINUOUS
Status: DISCONTINUED | OUTPATIENT
Start: 2017-07-09 | End: 2017-07-14 | Stop reason: HOSPADM

## 2017-07-09 RX ADMIN — METRONIDAZOLE 500 MG: 500 INJECTION, SOLUTION INTRAVENOUS at 17:25

## 2017-07-09 RX ADMIN — ATORVASTATIN CALCIUM 10 MG: 10 TABLET, FILM COATED ORAL at 09:17

## 2017-07-09 RX ADMIN — SODIUM CHLORIDE 125 ML/HR: 9 INJECTION, SOLUTION INTRAVENOUS at 09:17

## 2017-07-09 RX ADMIN — METRONIDAZOLE 500 MG: 500 INJECTION, SOLUTION INTRAVENOUS at 23:45

## 2017-07-09 RX ADMIN — CARVEDILOL 12.5 MG: 12.5 TABLET, FILM COATED ORAL at 09:17

## 2017-07-09 RX ADMIN — ENOXAPARIN SODIUM 40 MG: 40 INJECTION SUBCUTANEOUS at 09:17

## 2017-07-09 RX ADMIN — SENNA AND DOCUSATE SODIUM 2 TABLET: 50; 8.6 TABLET, FILM COATED ORAL at 17:25

## 2017-07-09 RX ADMIN — SIMETHICONE CHEW TAB 80 MG 80 MG: 80 TABLET ORAL at 09:15

## 2017-07-09 RX ADMIN — LEVOFLOXACIN 750 MG: 5 INJECTION, SOLUTION INTRAVENOUS at 20:00

## 2017-07-09 RX ADMIN — SIMETHICONE CHEW TAB 80 MG 80 MG: 80 TABLET ORAL at 11:28

## 2017-07-09 RX ADMIN — Medication 250 MG: at 17:25

## 2017-07-09 RX ADMIN — SENNA AND DOCUSATE SODIUM 2 TABLET: 50; 8.6 TABLET, FILM COATED ORAL at 09:17

## 2017-07-09 RX ADMIN — BISACODYL 10 MG: 10 SUPPOSITORY RECTAL at 17:25

## 2017-07-09 RX ADMIN — POLYETHYLENE GLYCOL 3350 17 G: 17 POWDER, FOR SOLUTION ORAL at 09:17

## 2017-07-09 RX ADMIN — Medication 250 MG: at 09:17

## 2017-07-09 RX ADMIN — SODIUM CHLORIDE 125 ML/HR: 9 INJECTION, SOLUTION INTRAVENOUS at 14:58

## 2017-07-09 RX ADMIN — SIMETHICONE CHEW TAB 80 MG 80 MG: 80 TABLET ORAL at 17:26

## 2017-07-09 RX ADMIN — ACETAMINOPHEN 650 MG: 325 TABLET ORAL at 16:37

## 2017-07-09 NOTE — PLAN OF CARE
Problem: Patient Care Overview (Adult)  Goal: Plan of Care Review  Outcome: Ongoing (interventions implemented as appropriate)    07/09/17 7987   Coping/Psychosocial Response Interventions   Plan Of Care Reviewed With patient   Patient Care Overview   Progress improving   Outcome Evaluation   Outcome Summary/Follow up Plan pain tolerable, able to ambulate to brp with 1 assist and walker, CT and KUB ordered today GI consult placed, abd still distended, no other symptoms or complaints, able to pass gas, BM still am, IV fluids started bp improved, rechecking labs in am,. Educated pateint on blood pressure monitoring at home patient verblized understanding         Problem: Fall Risk (Adult)  Goal: Absence of Falls  Outcome: Ongoing (interventions implemented as appropriate)

## 2017-07-09 NOTE — THERAPY TREATMENT NOTE
"Acute Care - Physical Therapy Treatment Note  [facility]     Patient Name: Demetris Nye  : 1945  MRN: [id number]  Today's Date: 2017             Admit Date: 2017    Visit Dx:    ICD-10-CM ICD-9-CM   1. Fall, initial encounter W19.XXXA E888.9   2. Closed right hip fracture, initial encounter S72.001A 820.8   3. Impaired mobility Z74.09 799.89     Patient Active Problem List   Diagnosis   • Fall   • Closed right hip fracture   • HTN (hypertension)   • PVC (premature ventricular contraction)               Adult Rehabilitation Note       17 1048 17 0933 17 1139    Rehab Assessment/Intervention    Discipline physical therapy assistant  -SD physical therapy assistant  -SD physical therapist  -MS    Document Type therapy note (daily note)  -SD therapy note (daily note)  -SD therapy note (daily note)  -MS    Subjective Information agree to therapy  -SD agree to therapy;complains of;pain;dizziness  -SD agree to therapy;complains of;weakness;fatigue;pain  -MS    Patient Effort, Rehab Treatment good  -SD good  -SD good  -MS    Symptoms Noted Comment  pt reports feeling light-headed and abdominal pain  -SD Pt. reports feeling \"very tired\" this AM with continued pain/soreness in his Right L.E.  -MS    Precautions/Limitations fall precautions;hip precautions- right  -SD fall precautions;hip precautions- right  -SD fall precautions;hip precautions- right  -MS    Recorded by [SD] Diane Ross PTA [SD] Diane Ross PTA [MS] Efra Arroyo, VONDA    Pain Assessment    Pain Assessment No/denies pain  -SD Mas-Baker FACES  -SD 0-10  -MS    Mas-Roa FACES Pain Rating  6  -SD     Pain Score   4  -MS    Post Pain Score   4  -MS    Pain Type  Acute pain  -SD Acute pain;Surgical pain  -MS    Pain Location  Abdomen  -SD Leg  -MS    Pain Orientation   Right  -MS    Pain Intervention(s) Repositioned;Ambulation/increased activity;Rest  -SD Repositioned;Ambulation/increased activity;Rest  " -SD Medication (See MAR);Cold applied;Repositioned;Rest;Elevated  -MS    Recorded by [SD] Diane Ross PTA [SD] Diane Ross PTA [MS] Efra Arroyo, VONDA    Cognitive Assessment/Intervention    Current Cognitive/Communication Assessment functional  -SD functional  -SD functional  -MS    Orientation Status oriented x 4  -SD oriented x 4  -SD oriented x 4  -MS    Follows Commands/Answers Questions 100% of the time  -% of the time  -% of the time  -MS    Personal Safety WNL/WFL  -SD WNL/WFL  -SD WNL/WFL  -MS    Personal Safety Interventions fall prevention program maintained;gait belt;nonskid shoes/slippers when out of bed  -SD fall prevention program maintained;gait belt;nonskid shoes/slippers when out of bed  -SD fall prevention program maintained;gait belt;nonskid shoes/slippers when out of bed;supervised activity  -MS    Recorded by [SD] Diane Ross PTA [SD] Diane Ross PTA [MS] Efra Arroyo, VONDA    Mobility Assessment/Training    Extremity Weight-Bearing Status   right lower extremity  -MS    Right Lower Extremity Weight-Bearing   weight-bearing as tolerated  -MS    Recorded by   [MS] Efra Arroyo PT    Bed Mobility, Assessment/Treatment    Bed Mob, Sit to Supine, [place] not tested  -SD not tested  -SD     Bed Mobility, Comment up in chair  -SD up in chair  -SD Pt. up in chair this AM.  -MS    Recorded by [SD] Diane Ross PTA [SD] Diane Ross PTA [MS] Efra Arroyo, PT    Transfer Assessment/Treatment    Transfers, Sit-Stand [place] contact guard assist;minimum assist (75% patient effort)  -SD minimum assist (75% patient effort);2 person assist required;verbal cues required  -SD minimum assist (75% patient effort);2 person assist required  -MS    Transfers, Stand-Sit [place] contact guard assist  -SD minimum assist (75% patient effort);2 person assist required;verbal cues required  -SD minimum assist (75% patient effort);2 person  assist required  -MS    Transfers, Sit-Stand-Sit, Assist Device rolling walker  -SD rolling walker  -SD rolling walker  -MS    Toilet Transfer, [place]  minimum assist (75% patient effort);2 person assist required;verbal cues required  -SD     Toilet Transfer, Assistive Device  rolling walker  -SD     Transfer, Impairments ROM decreased;strength decreased  -SD ROM decreased;strength decreased;pain  -SD     Recorded by [SD] Diane Ross PTA [SD] Diane Ross PTA [MS] Efra Arroyo, PT    Gait Assessment/Treatment    Gait, [place] Level contact guard assist;minimum assist (75% patient effort)  -SD minimum assist (75% patient effort);2 person assist required;verbal cues required  -SD minimum assist (75% patient effort)  -MS    Gait, Assistive Device rolling walker  -SD rolling walker  -SD rolling walker  -MS    Gait, Distance (Feet) 75  -SD 30  -SD 75  -MS    Gait, Gait Deviations antalgic;tamara decreased;decreased heel strike;step length decreased  -SD antalgic;tamara decreased;decreased heel strike;forward flexed posture;step length decreased  -SD right:;antalgic;tamara decreased;decreased heel strike;forward flexed posture;step length decreased  -MS    Gait, Safety Issues step length decreased  -SD step length decreased  -SD balance decreased during turns;step length decreased  -MS    Gait, Impairments ROM decreased;strength decreased  -SD ROM decreased;strength decreased;pain  -SD     Gait, Comment x2 standing rest breaks  -SD x2 standing rest breaks leaning forward over walker due to abdominal pain  -SD x 4 standing rest breaks due to overall fatigue, pain, weakness.  Pt. also requires mod. verbal/tactile cues for posture correction (as pt. tends to lean forward on walker when resting), as well as verbal cues to increase his bilateral heel strike (shuffled steps)  -MS    Recorded by [SD] Diane Ross PTA [SD] Diane Ross PTA [MS] Efra Arroyo, PT    Therapy  Exercises    Bilateral Lower Extremities AROM:;10 reps;ankle pumps/circles  -SD      Exercise Protocols   hip ORIF  -MS    Hip ORIF Exercises   right:;15 reps;completed protocol  -MS    Recorded by [SD] Diane Ross PTA  [MS] Efra Arroyo, PT    Positioning and Restraints    Pre-Treatment Position sitting in chair/recliner  -SD sitting in chair/recliner  -SD sitting in chair/recliner  -MS    Post Treatment Position chair  -SD chair  -SD chair  -MS    In Chair reclined;call light within reach;encouraged to call for assist;with other staff   MD in room  -SD reclined;call light within reach;encouraged to call for assist;with nsg  -SD notified nsg;reclined;sitting;call light within reach;encouraged to call for assist;with family/caregiver   Ice pack to Right hip  -MS    Recorded by [SD] Diane Ross PTA [SD] Diane Ross PTA [MS] Efra Arroyo, PT      07/06/17 5023          Rehab Assessment/Intervention    Discipline physical therapy assistant  -OSCAR      Document Type therapy note (daily note)  -OSCAR      Subjective Information agree to therapy  -OSCAR      Patient Effort, Rehab Treatment good  -OSCAR      Precautions/Limitations fall precautions;hip precautions- right  -OSCAR      Recorded by [OSCAR] Hang Johnson PTA      Pain Assessment    Pain Assessment 0-10  -OSCAR      Pain Score 4  -OSCAR      Post Pain Score 4  -OSCAR      Pain Type Acute pain;Surgical pain  -OSCAR      Pain Location Leg  -OSCAR      Pain Orientation Right  -OSCAR      Pain Intervention(s) Ambulation/increased activity;Repositioned;Rest  -OSCAR      Recorded by [OSCAR] Hang Johnson PTA      Cognitive Assessment/Intervention    Current Cognitive/Communication Assessment functional  -OSCAR      Orientation Status oriented x 4  -OSCAR      Follows Commands/Answers Questions 100% of the time;able to follow single-step instructions;needs cueing;needs increased time;needs repetition  -OSCAR      Personal Safety mild impairment;decreased awareness, need for  assist;decreased awareness, need for safety;decreased insight to deficits  -OSCAR      Personal Safety Interventions fall prevention program maintained;gait belt;nonskid shoes/slippers when out of bed  -OSCAR      Recorded by [OSCAR] Hang Johnson PTA      Bed Mobility, Assessment/Treatment    Bed Mob, Supine to Sit, Daisy not tested  -OSCAR      Bed Mob, Sit to Supine, Daisy not tested  -OSCAR      Recorded by [OSCAR] Hang Johnson PTA      Transfer Assessment/Treatment    Transfers, Sit-Stand Daisy minimum assist (75% patient effort);nonverbal cues required (demo/gesture);verbal cues required  -OSCAR      Transfers, Stand-Sit Daisy minimum assist (75% patient effort);nonverbal cues required (demo/gesture);verbal cues required  -OSCAR      Transfers, Sit-Stand-Sit, Assist Device rolling walker  -OSCAR      Transfer, Impairments strength decreased;impaired balance;pain  -OSCAR      Recorded by [OSCAR] Hang Johnson PTA      Gait Assessment/Treatment    Gait, Daisy Level minimum assist (75% patient effort);verbal cues required;nonverbal cues required (demo/gesture)  -OSCAR      Gait, Assistive Device rolling walker  -OSCAR      Gait, Distance (Feet) 75  -OSCAR      Gait, Gait Deviations tmaara decreased;decreased heel strike;forward flexed posture;limb motion velocity decreased;step length decreased;toe-to-floor clearance decreased;weight-shifting ability decreased  -OSCAR      Gait, Safety Issues step length decreased;weight-shifting ability decreased;balance decreased during turns  -OSCAR      Gait, Impairments strength decreased;impaired balance;pain  -OSCAR      Gait, Comment Pt frequently cued for improved posture and (B)step length/height  -OSCAR      Recorded by [OSCAR] Hang Johnson PTA      Therapy Exercises    Exercise Protocols hip ORIF  -OSCAR      Hip ORIF Exercises right:;15 reps;completed protocol  -OSCAR      Recorded by [OSCAR] Hang Johnson PTA      Positioning and Restraints    Pre-Treatment Position sitting in chair/recliner  -OSCAR       Post Treatment Position chair  -OSCAR      In Chair sitting;call light within reach;encouraged to call for assist  -OSCAR      Recorded by [OSCAR] Hang Johnson, PTA        User Key  (r) = Recorded By, (t) = Taken By, (c) = Cosigned By    Initials Name Effective Dates    MS Efra Arroyo, PT 12/01/15 -     SD Diane Ross, PTA 01/27/16 -     OSCAR Johnson, PTA 04/24/15 -                 IP PT Goals       07/01/17 1534          Bed Mobility PT LTG    Bed Mobility PT LTG, Date Established 07/01/17  -CK      Bed Mobility PT LTG, Time to Achieve 5 days  -CK      Bed Mobility PT LTG, Activity Type all bed mobility  -CK      Bed Mobility PT LTG, [place] Level minimum assist (75% patient effort)  -CK      Bed Mobility PT Goal  LTG, Assist Device bed rails  -CK      Transfer Training PT LTG    Transfer Training PT LTG, Date Established 07/01/17  -CK      Transfer Training PT LTG, Time to Achieve 5 days  -CK      Transfer Training PT LTG, Activity Type bed to chair /chair to bed  -CK      Transfer Training PT LTG, [place] Level minimum assist (75% patient effort)  -CK      Transfer Training PT LTG, Assist Device walker, rolling  -CK      Transfer Training 2 PT LTG    Transfer Training PT 2 LTG, Date Established 07/01/17  -CK      Transfer Training PT 2 LTG, Activity Type sit to stand/stand to sit  -CK      Transfer Training PT 2 LTG, [place] Level contact guard assist  -CK      Transfer Training PT 2 LTG, Assist Device walker, rolling  -CK      Gait Training PT LTG    Gait Training Goal PT LTG, Date Established 07/01/17  -CK      Gait Training Goal PT LTG, Time to Achieve 5 days  -CK      Gait Training Goal PT LTG, [place] Level minimum assist (75% patient effort)  -CK      Gait Training Goal PT LTG, Assist Device walker, rolling  -CK      Gait Training Goal PT LTG, Distance to Achieve 100 feet  -CK        User Key  (r) = Recorded By, (t) = Taken By, (c) = Cosigned By    Initials Name Provider Type     CK Jay Núñez, PT Physical Therapist          Physical Therapy Education     Title: PT OT SLP Therapies (Done)     Topic: Physical Therapy (Done)     Point: Mobility training (Done)    Learning Progress Summary    Learner Readiness Method Response Comment Documented by Status   Patient Acceptance E,TB VU  SD 07/09/17 1111 Done    Acceptance E,TB VU,NR  SD 07/08/17 1030 Done    Acceptance E,D VU,NR  MS 07/07/17 1144 Done    Acceptance E VU,NR   07/06/17 1606 Done    Acceptance E,TB,D VU,NR   07/05/17 1141 Done    Acceptance E VU   07/04/17 1000 Done    Acceptance E,D VU,NR  MS 07/03/17 1013 Done    Acceptance E,TB VU   07/02/17 1600 Done    Acceptance E VU Educated on WBAT status  07/01/17 1533 Done   Family Acceptance E,TB VU   07/02/17 1600 Done               Point: Home exercise program (Done)    Learning Progress Summary    Learner Readiness Method Response Comment Documented by Status   Patient Acceptance E,TB VU  SD 07/09/17 1111 Done    Acceptance E,TB VU,NR  SD 07/08/17 1030 Done    Acceptance E,D VU,NR  MS 07/07/17 1144 Done    Acceptance E VU,NR   07/06/17 1606 Done    Acceptance E,TB,D VU,NR   07/05/17 1141 Done    Acceptance E,D VU,NR  MS 07/03/17 1013 Done    Acceptance E,TB VU   07/02/17 1600 Done    Acceptance E VU   07/02/17 0929 Done   Family Acceptance E,TB VU   07/02/17 1600 Done               Point: Body mechanics (Done)    Learning Progress Summary    Learner Readiness Method Response Comment Documented by Status   Patient Acceptance E,TB VU  SD 07/09/17 1111 Done    Acceptance E,TB VU,NR  SD 07/08/17 1030 Done    Acceptance E,D VU,NR  MS 07/07/17 1144 Done    Acceptance E VU,NR   07/06/17 1606 Done    Acceptance E,TB,D VU,NR   07/05/17 1141 Done    Acceptance E,D VU,NR  MS 07/03/17 1013 Done               Point: Precautions (Done)    Learning Progress Summary    Learner Readiness Method Response Comment Documented by Status   Patient Acceptance E,TB VU  SD 07/09/17  1111 Done    Acceptance E,TB VU,NR  SD 07/08/17 1030 Done    Acceptance E,D VU,NR  MS 07/07/17 1144 Done    Acceptance E VU,NR  OSCAR 07/06/17 1606 Done    Acceptance E,TB,D VU,NR  RW 07/05/17 1141 Done    Acceptance E,D VU,NR  MS 07/03/17 1013 Done    Acceptance E,TB VU  AC 07/02/17 1600 Done    Acceptance E VU Educated on WBAT status CK 07/01/17 1533 Done   Family Acceptance E,TB VU  AC 07/02/17 1600 Done                      User Key     Initials Effective Dates Name Provider Type Discipline     04/24/15 -  Jay Núñez, PT Physical Therapist PT    AC 06/16/16 -  Babs Jordan, RN Registered Nurse Nurse    MS 12/01/15 -  Efra Arroyo, PT Physical Therapist PT    SD 01/27/16 -  Diane Ross, PTA Technician PT    RW 04/06/16 -  Amanda Ospina, PTA Physical Therapy Assistant PT    OSCAR 04/24/15 -  Hang Johnson, PTA Physical Therapy Assistant PT                    PT Recommendation and Plan  Anticipated Discharge Disposition: skilled nursing facility  PT Frequency: daily  Plan of Care Review  Plan Of Care Reviewed With: patient  Progress: progress toward functional goals as expected  Outcome Summary/Follow up Plan: Pt showed improvement this date requiring less assist for functional mobility and tolerated increased gait distance w/ no c/o pain. Pt states he wants to go home and will continue doing exercises at home. PT will follow up tomorrow to address functional mobility deficits.          Outcome Measures       07/09/17 1100 07/08/17 1000 07/07/17 1100    How much help from another person do you currently need...    Turning from your back to your side while in flat bed without using bedrails? 3  -SD 3  -SD 3  -MS    Moving from lying on back to sitting on the side of a flat bed without bedrails? 3  -SD 3  -SD 3  -MS    Moving to and from a bed to a chair (including a wheelchair)? 3  -SD 3  -SD 3  -MS    Standing up from a chair using your arms (e.g., wheelchair, bedside chair)? 3  -SD 3  -SD 3  -MS     Climbing 3-5 steps with a railing? 2  -SD 2  -SD 2  -MS    To walk in hospital room? 3  -SD 3  -SD 3  -MS    AM-PAC 6 Clicks Score 17  -SD 17  -SD 17  -MS    Functional Assessment    Outcome Measure Options AM-PAC 6 Clicks Basic Mobility (PT)  -SD AM-PAC 6 Clicks Basic Mobility (PT)  -SD AM-PAC 6 Clicks Basic Mobility (PT)  -MS      07/06/17 1600          How much help from another person do you currently need...    Turning from your back to your side while in flat bed without using bedrails? 3  -OSCAR      Moving from lying on back to sitting on the side of a flat bed without bedrails? 3  -OSCAR      Moving to and from a bed to a chair (including a wheelchair)? 3  -OSCAR      Standing up from a chair using your arms (e.g., wheelchair, bedside chair)? 3  -OSCAR      Climbing 3-5 steps with a railing? 2  -OSCAR      To walk in hospital room? 3  -OSCAR      AM-PAC 6 Clicks Score 17  -OSCAR      Functional Assessment    Outcome Measure Options AM-PAC 6 Clicks Basic Mobility (PT)  -OSCAR        User Key  (r) = Recorded By, (t) = Taken By, (c) = Cosigned By    Initials Name Provider Type    MS Efra Arroyo, PT Physical Therapist    JENAE Ross PTA Technician    OSCAR Johnson PTA Physical Therapy Assistant           Time Calculation:         PT Charges       07/09/17 1115          Time Calculation    Start Time 1048  -SD      Stop Time 1103  -SD      Time Calculation (min) 15 min  -SD      PT Received On 07/09/17  -SD      PT - Next Appointment 07/10/17  -SD        User Key  (r) = Recorded By, (t) = Taken By, (c) = Cosigned By    Initials Name Provider Type    JENAE Ross PTA Technician          Therapy Charges for Today     Code Description Service Date Service Provider Modifiers Qty    61730182917 HC PT THER PROC EA 15 MIN 7/8/2017 Diane Ross PTA GP 1    64103114382 HC PT THER SUPP EA 15 MIN 7/8/2017 Diane Ross PTA GP 1    06868573893 HC PT THER PROC EA 15 MIN 7/9/2017 Diane Ross PTA GP 1           PT G-Codes  Outcome Measure Options: AM-PAC 6 Clicks Basic Mobility (PT)    Diane Mims, PTA  7/9/2017

## 2017-07-09 NOTE — PROGRESS NOTES
" LOS: 9 days     Name: Demetris Nye  Age: 71 y.o.  Sex: male  :  1945  MRN: [id number]         Primary Care Physician: Rian Morfin MD    Subjective   Subjective  Abdomen remains distended.  +BM this AM.  Denies N/V.  +Flatus.  Hip pain is controlled    Objective   Vital Signs  Temp:  [96.8 °F (36 °C)-98.8 °F (37.1 °C)] 97.1 °F (36.2 °C)  Heart Rate:  [62-78] 76  Resp:  [17-19] 18  BP: ()/(58-66) 108/65  Body mass index is 35.67 kg/(m^2).    Objective:  General Appearance:  Comfortable and in no acute distress.    Vital signs: (most recent): Blood pressure 108/65, pulse 76, temperature 97.1 °F (36.2 °C), temperature source Oral, resp. rate 18, height 72\" (182.9 cm), weight 263 lb (119 kg), SpO2 92 %.    Lungs:  Normal respiratory rate and normal effort.    Heart: Normal rate.  Regular rhythm.    Abdomen: Abdomen is soft and distended.  Absent bowel sounds.   There is no abdominal tenderness.     Extremities: There is no local swelling or dependent edema.    Neurological: Patient is alert and oriented to person, place and time.    Skin:  Warm and dry.              Results Review:       I reviewed the patient's new clinical results.      Results from last 7 days  Lab Units 17  0442 17  1259   HEMOGLOBIN g/dL  --  14.1   PLATELETS 10*3/mm3 203  --        Results from last 7 days  Lab Units 17  0449   SODIUM mmol/L 131*   POTASSIUM mmol/L 3.8   CHLORIDE mmol/L 93*   CO2 mmol/L 19.9*   BUN mg/dL 81*   CREATININE mg/dL 1.86*   CALCIUM mg/dL 8.6   GLUCOSE mg/dL 119*                 Scheduled Meds:     atorvastatin 10 mg Oral Daily   bisacodyl 10 mg Rectal Daily   carvedilol 12.5 mg Oral Daily   enoxaparin 40 mg Subcutaneous Daily   polyethylene glycol 17 g Oral Daily   saccharomyces boulardii 250 mg Oral BID   sennosides-docusate sodium 2 tablet Oral BID   simethicone 80 mg Oral 4x Daily AC & at Bedtime     PRN Meds:   •  acetaminophen  •  diphenhydrAMINE  •  melatonin  •  " ondansetron **OR** ondansetron ODT **OR** ondansetron  •  pneumococcal polysaccharide 23-valent  •  sodium chloride  Continuous Infusions:    sodium chloride 125 mL/hr Last Rate: 125 mL/hr (07/09/17 0917)       Assessment/Plan   Principal Problem:    Closed right hip fracture  Active Problems:    Fall    HTN (hypertension)    PVC (premature ventricular contraction)  RUDY  Ileus  Hyponatremia    Assessment & Plan    - KUB shows gaseous distention of the colon.  He had a small BM this AM.  Denies N/V.  Abdomen remains distended.  I hear no bowel sounds currently.  I think he has significant constipation or a mild post-op ileus but will check a CT scan to be on the safe side.    - Opiate pain meds have been stopped and he is only requiring tylenol  - Now with acute renal failure.  BP low overnight.  Will stop the losartan and norvasc.  Have started NS @125cc.  CT will evaluate for hydronephrosis.  Bladder scan this AM showed only 13cc and he is urinating well.  Check urine studies  - Doing well with PT and at this point he will go home with HH when bowel and kidney issues resolved.  Workman's comp denied rehab coverage  - xray of right foot negative for fracture. Pain improved.  - Lovenox for DVT proph  - Follow up with Dr. Alba 2 weeks post op   - D/C staples POD #14.     Demetris Mccoy MD  [facility]  07/09/17  11:16 AM    Addendum: CT scan of the abdomen and pelvis is more consistent with colitis.  Going to initiate him on antibiotics and ask gastroenterology to evaluate.

## 2017-07-09 NOTE — PLAN OF CARE
Problem: Patient Care Overview (Adult)  Goal: Plan of Care Review  Outcome: Ongoing (interventions implemented as appropriate)    07/09/17 0810   Coping/Psychosocial Response Interventions   Plan Of Care Reviewed With patient   Patient Care Overview   Progress improving   Outcome Evaluation   Outcome Summary/Follow up Plan no complaints of pain in the hip, no complaints of nausea, gas chew given, relief of symptoms, abdomen distended, repeat KUB scheduled for this AM, monitor BP running 110's/60's, encourage ambulation, clear liquid diet, home soon       Goal: Adult Individualization and Mutuality  Outcome: Ongoing (interventions implemented as appropriate)  Goal: Discharge Needs Assessment  Outcome: Ongoing (interventions implemented as appropriate)    Problem: Orthopaedic Fracture (Adult)  Goal: Signs and Symptoms of Listed Potential Problems Will be Absent or Manageable (Orthopaedic Fracture)  Outcome: Ongoing (interventions implemented as appropriate)    Problem: Fall Risk (Adult)  Goal: Absence of Falls  Outcome: Ongoing (interventions implemented as appropriate)

## 2017-07-09 NOTE — PLAN OF CARE
Problem: Patient Care Overview (Adult)  Goal: Plan of Care Review  Outcome: Ongoing (interventions implemented as appropriate)    07/09/17 1112   Coping/Psychosocial Response Interventions   Plan Of Care Reviewed With patient   Patient Care Overview   Progress progress toward functional goals as expected   Outcome Evaluation   Outcome Summary/Follow up Plan Pt showed improvement this date requiring less assist for functional mobility and tolerated increased gait distance w/ no c/o pain. Pt states he wants to go home and will continue doing exercises at home. PT will follow up tomorrow to address functional mobility deficits.

## 2017-07-10 PROBLEM — K52.9 COLITIS: Status: ACTIVE | Noted: 2017-07-10

## 2017-07-10 PROBLEM — A04.72 C. DIFFICILE COLITIS: Status: ACTIVE | Noted: 2017-07-10

## 2017-07-10 LAB
ANION GAP SERPL CALCULATED.3IONS-SCNC: 14.8 MMOL/L
BASOPHILS # BLD AUTO: 0.06 10*3/MM3 (ref 0–0.2)
BASOPHILS NFR BLD AUTO: 0.2 % (ref 0–1.5)
BILIRUB UR QL STRIP: NEGATIVE
BUN BLD-MCNC: 87 MG/DL (ref 8–23)
BUN/CREAT SERPL: 55.1 (ref 7–25)
C DIFF TOX GENS STL QL NAA+PROBE: POSITIVE
CALCIUM SPEC-SCNC: 8.6 MG/DL (ref 8.6–10.5)
CHLORIDE SERPL-SCNC: 100 MMOL/L (ref 98–107)
CLARITY UR: CLEAR
CO2 SERPL-SCNC: 23.2 MMOL/L (ref 22–29)
COLOR UR: ABNORMAL
CREAT BLD-MCNC: 1.58 MG/DL (ref 0.76–1.27)
CREAT UR-MCNC: 95.8 MG/DL
DEPRECATED RDW RBC AUTO: 45.2 FL (ref 37–54)
EOSINOPHIL # BLD AUTO: 0.15 10*3/MM3 (ref 0–0.7)
EOSINOPHIL NFR BLD AUTO: 0.5 % (ref 0.3–6.2)
EOSINOPHIL SPEC QL MICRO: 0 % EOS/100 CELLS (ref 0–0)
ERYTHROCYTE [DISTWIDTH] IN BLOOD BY AUTOMATED COUNT: 13.7 % (ref 11.5–14.5)
GFR SERPL CREATININE-BSD FRML MDRD: 44 ML/MIN/1.73
GLUCOSE BLD-MCNC: 133 MG/DL (ref 65–99)
GLUCOSE UR STRIP-MCNC: NEGATIVE MG/DL
HCT VFR BLD AUTO: 37.2 % (ref 40.4–52.2)
HGB BLD-MCNC: 12.7 G/DL (ref 13.7–17.6)
HGB UR QL STRIP.AUTO: NEGATIVE
IMM GRANULOCYTES # BLD: 0.39 10*3/MM3 (ref 0–0.03)
IMM GRANULOCYTES NFR BLD: 1.4 % (ref 0–0.5)
KETONES UR QL STRIP: NEGATIVE
LEUKOCYTE ESTERASE UR QL STRIP.AUTO: NEGATIVE
LYMPHOCYTES # BLD AUTO: 1.22 10*3/MM3 (ref 0.9–4.8)
LYMPHOCYTES NFR BLD AUTO: 4.3 % (ref 19.6–45.3)
MCH RBC QN AUTO: 30.9 PG (ref 27–32.7)
MCHC RBC AUTO-ENTMCNC: 34.1 G/DL (ref 32.6–36.4)
MCV RBC AUTO: 90.5 FL (ref 79.8–96.2)
MONOCYTES # BLD AUTO: 2.66 10*3/MM3 (ref 0.2–1.2)
MONOCYTES NFR BLD AUTO: 9.4 % (ref 5–12)
NEUTROPHILS # BLD AUTO: 23.85 10*3/MM3 (ref 1.9–8.1)
NEUTROPHILS NFR BLD AUTO: 84.2 % (ref 42.7–76)
NITRITE UR QL STRIP: NEGATIVE
NRBC BLD MANUAL-RTO: 0 /100 WBC (ref 0–0)
PH UR STRIP.AUTO: <=5 [PH] (ref 5–8)
PLATELET # BLD AUTO: 231 10*3/MM3 (ref 140–500)
PMV BLD AUTO: 13.6 FL (ref 6–12)
POTASSIUM BLD-SCNC: 4.1 MMOL/L (ref 3.5–5.2)
PROT UR QL STRIP: ABNORMAL
RBC # BLD AUTO: 4.11 10*6/MM3 (ref 4.6–6)
SODIUM BLD-SCNC: 138 MMOL/L (ref 136–145)
SODIUM UR-SCNC: <20 MMOL/L
SP GR UR STRIP: 1.02 (ref 1–1.03)
UROBILINOGEN UR QL STRIP: ABNORMAL
WBC NRBC COR # BLD: 28.33 10*3/MM3 (ref 4.5–10.7)

## 2017-07-10 PROCEDURE — 81003 URINALYSIS AUTO W/O SCOPE: CPT | Performed by: INTERNAL MEDICINE

## 2017-07-10 PROCEDURE — 80048 BASIC METABOLIC PNL TOTAL CA: CPT | Performed by: INTERNAL MEDICINE

## 2017-07-10 PROCEDURE — 87205 SMEAR GRAM STAIN: CPT | Performed by: INTERNAL MEDICINE

## 2017-07-10 PROCEDURE — 99254 IP/OBS CNSLTJ NEW/EST MOD 60: CPT | Performed by: INTERNAL MEDICINE

## 2017-07-10 PROCEDURE — 84300 ASSAY OF URINE SODIUM: CPT | Performed by: INTERNAL MEDICINE

## 2017-07-10 PROCEDURE — 97110 THERAPEUTIC EXERCISES: CPT

## 2017-07-10 PROCEDURE — 87046 STOOL CULTR AEROBIC BACT EA: CPT | Performed by: INTERNAL MEDICINE

## 2017-07-10 PROCEDURE — 94799 UNLISTED PULMONARY SVC/PX: CPT

## 2017-07-10 PROCEDURE — 85025 COMPLETE CBC W/AUTO DIFF WBC: CPT | Performed by: INTERNAL MEDICINE

## 2017-07-10 PROCEDURE — 87493 C DIFF AMPLIFIED PROBE: CPT | Performed by: INTERNAL MEDICINE

## 2017-07-10 PROCEDURE — 87045 FECES CULTURE AEROBIC BACT: CPT | Performed by: INTERNAL MEDICINE

## 2017-07-10 PROCEDURE — 25010000002 ENOXAPARIN PER 10 MG: Performed by: ORTHOPAEDIC SURGERY

## 2017-07-10 PROCEDURE — 82570 ASSAY OF URINE CREATININE: CPT | Performed by: INTERNAL MEDICINE

## 2017-07-10 RX ADMIN — SIMETHICONE CHEW TAB 80 MG 80 MG: 80 TABLET ORAL at 21:00

## 2017-07-10 RX ADMIN — METRONIDAZOLE 500 MG: 500 INJECTION, SOLUTION INTRAVENOUS at 09:49

## 2017-07-10 RX ADMIN — SODIUM CHLORIDE 125 ML/HR: 9 INJECTION, SOLUTION INTRAVENOUS at 04:16

## 2017-07-10 RX ADMIN — SODIUM CHLORIDE 125 ML/HR: 9 INJECTION, SOLUTION INTRAVENOUS at 14:47

## 2017-07-10 RX ADMIN — Medication 250 MG: at 18:05

## 2017-07-10 RX ADMIN — Medication 250 MG: at 08:24

## 2017-07-10 RX ADMIN — VANCOMYCIN 250 MG: KIT at 18:05

## 2017-07-10 RX ADMIN — ATORVASTATIN CALCIUM 10 MG: 10 TABLET, FILM COATED ORAL at 08:24

## 2017-07-10 RX ADMIN — METRONIDAZOLE 500 MG: 500 INJECTION, SOLUTION INTRAVENOUS at 18:05

## 2017-07-10 RX ADMIN — ENOXAPARIN SODIUM 40 MG: 40 INJECTION SUBCUTANEOUS at 08:24

## 2017-07-10 NOTE — PLAN OF CARE
Problem: Patient Care Overview (Adult)  Goal: Plan of Care Review  Outcome: Ongoing (interventions implemented as appropriate)    07/10/17 0207   Coping/Psychosocial Response Interventions   Plan Of Care Reviewed With patient   Patient Care Overview   Progress progress toward functional goals as expected   Outcome Evaluation   Outcome Summary/Follow up Plan Pt is a post op day 8 of a rt IM Nailing of the hip. Pt was having some abdominal issues. KUB/CT scan ordered. Tests came back ok. GI consult in place. Pt continues to be very drowsy. Pt will get up when called, but quickly falls back asleep. Pt continues on IV fluids. Pt continues on a clear liquid diet. Pt has no complaints of pain at this time. Pt wants to go home. Pt has been educated on the importance of monitoring his blood pressure related to his comorbidity of HTN. Pt voiced understanding. Pt continues on IV Levaquin and IV Flagyl . No adverse effects noted.       Goal: Adult Individualization and Mutuality  Outcome: Ongoing (interventions implemented as appropriate)  Goal: Discharge Needs Assessment  Outcome: Ongoing (interventions implemented as appropriate)    Problem: Fall Risk (Adult)  Goal: Absence of Falls  Outcome: Ongoing (interventions implemented as appropriate)

## 2017-07-10 NOTE — CONSULTS
[facility]  Initial Inpatient Consult Note    Referring Provider: Nadine    Reason for Consultation: colitis    Subjective     History of present illness:  72yo M with PMH as below  admitted 6/30 following a fall at work for a R hip fracture.  He had an IM nail on 7/1.  He had been recovering from this but developed constipation and abdominal distension.  He had a CT demonstrating diffuses colitis but predominantly in the ascending and transverse colon.  He denies a history of colitis.  Other than gurgling in his stomach, he is not having any discomfort.  He has been drinking well but has poor appetite.  No nausea, vomiting or diarrhea.  Reports a normal colonoscopy at [place]--reviewed in records and only benign polyp removed.  No family history of IBD or GI malignancy.  He is anxious for home.  He did have a large BM yesterday following laxatives and 3 smaller stools    Stool studies are pending.  He is now on levo and flagyl.  Labs today with markedly elevated WBC.    Past Medical History:  Past Medical History:   Diagnosis Date   • Arthritis    • Hyperlipidemia    • Hypertension    • Irregular cardiac rhythm        Past Surgical History:  Past Surgical History:   Procedure Laterality Date   • ND OPEN FIX INTER/SUBTROCH FX,IMPLNT Right 7/1/2017    Procedure: RIGHT INTRAMEDULLARY NAILING/RODDING;  Surgeon: Manuelito Alba MD;  Location: [facility];  Service: Orthopedics        Social History:   Social History   Substance Use Topics   • Smoking status: Current Every Day Smoker   • Smokeless tobacco: Not on file   • Alcohol use No        Family History:  History reviewed. No pertinent family history.    Home Meds:  Prescriptions Prior to Admission   Medication Sig Dispense Refill Last Dose   • amLODIPine (NORVASC) 10 MG tablet Take 10 mg by mouth Daily.   6/29/2017 at 2100   • aspirin 81 MG chewable tablet Chew 81 mg Daily.   6/30/2017 at 0900   • atorvastatin (LIPITOR) 10 MG tablet Take  10 mg by mouth Daily.   6/29/2017 at 2100   • carvedilol (COREG) 12.5 MG tablet Take 12.5 mg by mouth Daily.   6/29/2017 at 2100   • losartan (COZAAR) 50 MG tablet Take 100 mg by mouth Daily.   6/29/2017 at 2100   • sulindac (CLINORIL) 200 MG tablet Take 200 mg by mouth 2 (Two) Times a Day.   6/30/2017 at 0900       Current Meds:     atorvastatin 10 mg Oral Daily   bisacodyl 10 mg Rectal Daily   carvedilol 12.5 mg Oral Daily   enoxaparin 40 mg Subcutaneous Daily   levoFLOXacin 750 mg Intravenous Q24H   metroNIDAZOLE 500 mg Intravenous Q8H   polyethylene glycol 17 g Oral Daily   saccharomyces boulardii 250 mg Oral BID   sennosides-docusate sodium 2 tablet Oral BID   simethicone 80 mg Oral 4x Daily AC & at Bedtime       Allergies:  No Known Allergies    Review of Systems  All systems were reviewed and negative except for:  Gastrointestinal: postitive for  bloating / distention and constipation     Objective     Vital Signs  Temp:  [96.9 °F (36.1 °C)-97.3 °F (36.3 °C)] 96.9 °F (36.1 °C)  Heart Rate:  [58-72] 72  Resp:  [16-18] 16  BP: ()/(48-69) 112/64    Physical Exam:  Constitutional:    Alert, cooperative, in no acute distress, appears stated age   Eyes:            Lids and lashes normal, conjunctivae and sclerae normal, no   icterus   Ears, nose, mouth and throat:   Normal appearance of external ears and nose, no oral lesions, no thrush, oral mucosa moist   Respiratory:     Clear to auscultation, respirations regular, even and                   unlabored    Cardiovascular:    Regular rhythm and normal rate, normal S1 and S2, no            murmur, no gallop, palpable distal pulses, no lower extremity edema   Gastrointestinal:    Obese and firm, moderately distended, non-tender to palpation, no guarding, no rebound tenderness, normal bowel sounds, no palpable masses or organomegaly  Rectal exam: deferred   Musculoskeletal:   Normal station, no atrophy, no tenderness to palpation, normal digits and nails   Skin:    Normal color, no bleeding, bruising, rashes or lesions   Lymphatics:   No palpable cervical or supraclavicular adenopathy   Psychiatric:  Judgement and insight: normal   Orientation to person, place and time: normal   Mood and affect: normal       Results Review:   I reviewed the patient's new clinical results.      Results from last 7 days  Lab Units 07/10/17  0411 07/06/17  0442 07/03/17  1259   WBC 10*3/mm3 28.33*  --   --    HEMOGLOBIN g/dL 12.7*  --  14.1   HEMATOCRIT % 37.2*  --  41.4   PLATELETS 10*3/mm3 231 203  --          Results from last 7 days  Lab Units 07/10/17  0411 07/09/17  0449   SODIUM mmol/L 138 131*   POTASSIUM mmol/L 4.1 3.8   CHLORIDE mmol/L 100 93*   CO2 mmol/L 23.2 19.9*   BUN mg/dL 87* 81*   CREATININE mg/dL 1.58* 1.86*   CALCIUM mg/dL 8.6 8.6   GLUCOSE mg/dL 133* 119*             No results found for: LIPASE    Radiology:  Imaging Results (last 72 hours)     Procedure Component Value Units Date/Time    XR Abdomen 2 View With Chest 1 View [[id number]] Collected:  07/07/17 1637     Updated:  07/07/17 1706    Narrative:       CHEST X-RAY AND ABDOMEN SERIES     HISTORY: Abdominal pain and distention.     TECHNIQUE: Single view of the chest and 5 views of the abdomen and  pelvis are provided.     FINDINGS: The cardiomediastinal silhouette is normal. The lungs are  clear. There is no pneumothorax.     Gas distends the colon throughout its length. The transverse colon is  dilated to about 12 cm. Gas is observed to the level of the distal  sigmoid. No abnormally dilated small bowel is present. Stomach is small  in caliber. No free air is identified. No volvulus configuration of the  colon gas pattern is present.       Impression:       1. Negative chest x-ray.  2. Gaseous dilatation of the colon. Gas is seen to the level of the  rectum and there is no abnormally dilated small bowel or stomach. This  may represent ileus but would be somewhat unusual configuration for  obstruction.     This report  was finalized on 7/7/2017 5:03 PM by Dr. Neal Lowe MD.       XR Abdomen KUB [[id number]] Collected:  07/09/17 0957     Updated:  07/09/17 1006    Narrative:       XR ABDOMEN KUB-     INDICATIONS: Phalanx     TECHNIQUE: SUPINE VIEWS OF THE ABDOMEN     COMPARISON: 07/07/2017     FINDINGS:      The bowel gas pattern remains abnormal, with persistent abnormal  gaseous distention of the colon. Appearance is similar to the prior  exam. Free intraperitoneal gas cannot be excluded on a supine  radiograph. Continued follow-up is suggested as indications persist. If  there is further clinical concern, CT can be obtained for further  examination.       Impression:          As described.     This report was finalized on 7/9/2017 10:03 AM by Dr. Reggie Morgan MD.       CT Abdomen Pelvis Without Contrast [[id number]] Collected:  07/09/17 1501     Updated:  07/09/17 1516    Narrative:       CT OF THE ABDOMEN AND PELVIS WITH CONTRAST     HISTORY: 71-year-old male with a history of abdominal distention, pain  and hypoactive bowel sounds.     TECHNIQUE: Contiguous axial images were obtained through the abdomen and  pelvis without IV or oral contrast.     COMPARISON: None.     FINDINGS: The visualized portions of the lung bases are clear. The  visualized portion of the heart has a normal appearance. Calcified  granulomata are seen within the spleen. There is a 3.9 cm left renal  cyst. There is a 1.4 cm right renal cyst. The gallbladder, liver,  pancreas and adrenal glands have a normal noncontrasted appearance.     There is marked distention of the transverse colon with considerable  thickening of the wall of the proximal transverse colon to 9 mm.  Air-fluid levels are seen within the transverse colon. There is  stranding within the right hemiabdomen. A scant amount of free fluid is  seen within the right paracolic gutter. A small amount of free fluid is  seen within the pelvic cul-de-sac. There are no abnormally dilated  loops  of small bowel.       Impression:       Imaging features of the colon that are most consistent with  the presence of colitis. The majority of the colon is involved but it is  primarily most considerable within the ascending and transverse colon.     This report was finalized on 7/9/2017 3:13 PM by Dr. Miguel Angel Mcmullen MD.             Assessment/Plan     Principal Problem:    Closed right hip fracture  Active Problems:    Fall    HTN (hypertension)    PVC (premature ventricular contraction)    RUDY    Hyponatremia    Ileus, postoperative    1. Colitis: with elevated WBC, infectious cause is a concern  2. Constipation: likely post op ileus, seems to be resolving  3. Elevated WBC: ? c diff infection vs other    Recommendations  -check c diff, stool culture  -continue flagyl  -daily CBC  -consider mesenteric dopplers if no infectious source identified  -will follow allong    I discussed the patients findings and my recommendations with patient and family    Dictated utilizing Dragon dictation

## 2017-07-10 NOTE — PROGRESS NOTES
"DAILY PROGRESS NOTE  [facility]    Patient Identification:  Name: Demetris Nye  Age: 71 y.o.  Sex: male  :  1945  MRN: [id number]         Primary Care Physician: Rian Morfin MD    Subjective:  Interval History:No new problems today.    Objective:    Scheduled Meds:    atorvastatin 10 mg Oral Daily   bisacodyl 10 mg Rectal Daily   carvedilol 12.5 mg Oral Daily   enoxaparin 40 mg Subcutaneous Daily   levoFLOXacin 750 mg Intravenous Q24H   metroNIDAZOLE 500 mg Intravenous Q8H   polyethylene glycol 17 g Oral Daily   saccharomyces boulardii 250 mg Oral BID   sennosides-docusate sodium 2 tablet Oral BID   simethicone 80 mg Oral 4x Daily AC & at Bedtime     Continuous Infusions:    sodium chloride 125 mL/hr Last Rate: 125 mL/hr (07/10/17 0416)       Vital signs in last 24 hours:  Temp:  [96.9 °F (36.1 °C)-97.3 °F (36.3 °C)] 96.9 °F (36.1 °C)  Heart Rate:  [58-72] 72  Resp:  [16-18] 16  BP: ()/(52-69) 112/64    Intake/Output:    Intake/Output Summary (Last 24 hours) at 07/10/17 1216  Last data filed at 07/10/17 0702   Gross per 24 hour   Intake             1495 ml   Output             1200 ml   Net              295 ml       Exam:  /64 (BP Location: Right arm, Patient Position: Sitting)  Pulse 72  Temp 96.9 °F (36.1 °C) (Oral)   Resp 16  Ht 72\" (182.9 cm)  Wt 263 lb (119 kg)  SpO2 92%  BMI 35.67 kg/m2    General Appearance:    Alert, cooperative, no distress   Head:    Normocephalic, without obvious abnormality, atraumatic   Eyes:       Throat:   Lips, tongue, gums normal   Neck:   Supple, symmetrical, trachea midline, no JVD   Lungs:     Clear to auscultation bilaterally, respirations unlabored   Chest Wall:    No tenderness or deformity    Heart:    Regular rate and rhythm, S1 and S2 normal, no murmur,no  Rub or gallop   Abdomen:     Soft, very distended,  non-tender, bowel sounds active, no masses, no organomegaly    Extremities:   Extremities normal, atraumatic, " no cyanosis or edema   Pulses:      Skin:   Skin is warm and dry,  no rashes or palpable lesions   Neurologic:   no focal deficits noted      [unfilled]  Data Review:    Results from last 7 days  Lab Units 07/10/17  0411 07/09/17  0449   SODIUM mmol/L 138 131*   POTASSIUM mmol/L 4.1 3.8   CHLORIDE mmol/L 100 93*   CO2 mmol/L 23.2 19.9*   BUN mg/dL 87* 81*   CREATININE mg/dL 1.58* 1.86*   GLUCOSE mg/dL 133* 119*   CALCIUM mg/dL 8.6 8.6       Results from last 7 days  Lab Units 07/10/17  0411 07/06/17  0442 07/03/17  1259   WBC 10*3/mm3 28.33*  --   --    HEMOGLOBIN g/dL 12.7*  --  14.1   HEMATOCRIT % 37.2*  --  41.4   PLATELETS 10*3/mm3 231 203  --              No results found for: TROPONINT            Invalid input(s): PROT, LABALBU          No results found for: POCGLU        Patient Active Problem List   Diagnosis Code   • Fall W19.XXXA   • Closed right hip fracture S72.001A   • HTN (hypertension) I10   • PVC (premature ventricular contraction) I49.3   • RUDY N17.9   • Hyponatremia E87.1   • Ileus, postoperative K91.3   • Colitis K52.9       Assessment:  Principal Problem:    Closed right hip fracture  Active Problems:    Fall    HTN (hypertension)    PVC (premature ventricular contraction)    RUDY    Hyponatremia    Ileus, postoperative    Colitis      Plan:  GI consult noted. Continue antibiotics and await stool studies.    Joao Balbuena MD  7/10/2017  12:16 PM

## 2017-07-10 NOTE — PROGRESS NOTES
Continued Stay Note  [facility]     Patient Name: Demetris Nye  MRN: [id number]  Today's Date: 7/10/2017    Admit Date: 6/30/2017          Discharge Plan       07/10/17 1609    Case Management/Social Work Plan    Plan home w/ spouse and [facility]    Additional Comments Follow up w/ pt/spouse at the bedside, pt would like to d/c home w/ [facility]. Makenna/Skye met w/ pt, DME arranged ([facility], [facility]ney and [facility]). Lyla/[facility] FOLLOWING.               Discharge Codes     None            Mavis Paniagua RN

## 2017-07-10 NOTE — PLAN OF CARE
Problem: Patient Care Overview (Adult)  Goal: Plan of Care Review  Outcome: Ongoing (interventions implemented as appropriate)    07/10/17 7303   Coping/Psychosocial Response Interventions   Plan Of Care Reviewed With patient   Outcome Evaluation   Outcome Summary/Follow up Plan Pt increased ambulation distance. Verbal cueing still required for safety w/ transfers and mobility. Difficlty w/ LLE clearance and step length due to RLE pain.

## 2017-07-10 NOTE — THERAPY TREATMENT NOTE
Acute Care - Physical Therapy Treatment Note  [facility]     Patient Name: Demetris Nye  : 1945  MRN: [id number]  Today's Date: 7/10/2017             Admit Date: 2017    Visit Dx:    ICD-10-CM ICD-9-CM   1. Fall, initial encounter W19.XXXA E888.9   2. Closed right hip fracture, initial encounter S72.001A 820.8   3. Impaired mobility Z74.09 799.89     Patient Active Problem List   Diagnosis   • Fall   • Closed right hip fracture   • HTN (hypertension)   • PVC (premature ventricular contraction)   • RUDY   • Hyponatremia   • Ileus, postoperative   • Colitis               Adult Rehabilitation Note       07/10/17 1400 17 1048 17 0933    Rehab Assessment/Intervention    Discipline physical therapy assistant  -RW physical therapy assistant  -SD physical therapy assistant  -SD    Document Type therapy note (daily note)  -RW therapy note (daily note)  -SD therapy note (daily note)  -SD    Subjective Information agree to therapy  -RW agree to therapy  -SD agree to therapy;complains of;pain;dizziness  -SD    Patient Effort, Rehab Treatment fair  -RW good  -SD good  -SD    Symptoms Noted Comment   pt reports feeling light-headed and abdominal pain  -SD    Precautions/Limitations fall precautions;hip precautions- right  -RW fall precautions;hip precautions- right  -SD fall precautions;hip precautions- right  -SD    Recorded by [RW] Amanda Ospina, PTA [SD] Diane Ross, MICAELA [SD] Diane Ross, PTA    Pain Assessment    Pain Assessment 0-10  -RW No/denies pain  -SD Mas-Baker FACES  -SD    Mas-Roa FACES Pain Rating   6  -SD    Pain Score 8  -RW      Pain Type Acute pain  -RW  Acute pain  -SD    Pain Location Hip  -RW  Abdomen  -SD    Pain Orientation Right  -RW      Pain Intervention(s) Repositioned;Ambulation/increased activity  -RW Repositioned;Ambulation/increased activity;Rest  -SD Repositioned;Ambulation/increased activity;Rest  -SD    Response to Interventions tolerated  -RW       Recorded by [RW] Amanda Ospina PTA [SD] Diane Ross PTA [SD] Diane Ross PTA    Cognitive Assessment/Intervention    Current Cognitive/Communication Assessment functional  -RW functional  -SD functional  -SD    Orientation Status oriented x 4  -RW oriented x 4  -SD oriented x 4  -SD    Follows Commands/Answers Questions 100% of the time;able to follow single-step instructions;needs cueing  -% of the time  -% of the time  -SD    Personal Safety mild impairment  -RW WNL/WFL  -SD WNL/WFL  -SD    Personal Safety Interventions fall prevention program maintained;gait belt;nonskid shoes/slippers when out of bed  -RW fall prevention program maintained;gait belt;nonskid shoes/slippers when out of bed  -SD fall prevention program maintained;gait belt;nonskid shoes/slippers when out of bed  -SD    Recorded by [RW] Amanda Ospina, MICAELA [SD] Diane Ross PTA [SD] Diane Ross PTA    Bed Mobility, Assessment/Treatment    Bed Mob, Sit to Supine, [place] not tested  -RW not tested  -SD not tested  -SD    Bed Mob, Sidelying to Sit, [place] not tested  -RW      Bed Mobility, Comment pt up in chair  -RW up in chair  -SD up in chair  -SD    Recorded by [RW] Amanda Ospina PTA [SD] Diane Ross PTA [SD] Diane Ross PTA    Transfer Assessment/Treatment    Transfers, Sit-Stand Holden contact guard assist;verbal cues required  -RW contact guard assist;minimum assist (75% patient effort)  -SD minimum assist (75% patient effort);2 person assist required;verbal cues required  -SD    Transfers, Stand-Sit [place] minimum assist (75% patient effort);verbal cues required;nonverbal cues required (demo/gesture)  -RW contact guard assist  -SD minimum assist (75% patient effort);2 person assist required;verbal cues required  -SD    Transfers, Sit-Stand-Sit, Assist Device rolling walker  -RW rolling walker  -SD rolling walker  -SD    Toilet Transfer, Holden    minimum assist (75% patient effort);2 person assist required;verbal cues required  -SD    Toilet Transfer, Assistive Device   rolling walker  -SD    Transfer, Impairments  ROM decreased;strength decreased  -SD ROM decreased;strength decreased;pain  -SD    Transfer, Comment Verbal cueing required for hand placement prior to standing.   -RW      Recorded by [RW] Amanda Ospina PTA [SD] Diane Ross PTA [SD] Diane Ross PTA    Gait Assessment/Treatment    Gait, [place] Level contact guard assist;minimum assist (75% patient effort);verbal cues required  -RW contact guard assist;minimum assist (75% patient effort)  -SD minimum assist (75% patient effort);2 person assist required;verbal cues required  -SD    Gait, Assistive Device rolling walker  -RW rolling walker  -SD rolling walker  -SD    Gait, Distance (Feet) 90  -RW 75  -SD 30  -SD    Gait, Gait Deviations forward flexed posture;right:;antalgic;bilateral:;tamara decreased;left:;toe-to-floor clearance decreased;weight-shifting ability decreased  -RW antalgic;tamara decreased;decreased heel strike;step length decreased  -SD antalgic;tamara decreased;decreased heel strike;forward flexed posture;step length decreased  -SD    Gait, Safety Issues step length decreased;weight-shifting ability decreased  -RW step length decreased  -SD step length decreased  -SD    Gait, Impairments  ROM decreased;strength decreased  -SD ROM decreased;strength decreased;pain  -SD    Gait, Comment Verbal cueing required for LLE step length and clearance w/ ambulation.  -RW x2 standing rest breaks  -SD x2 standing rest breaks leaning forward over walker due to abdominal pain  -SD    Recorded by [RW] Amanda Ospina PTA [SD] Diane Ross PTA [SD] Diane Ross PTA    Therapy Exercises    Bilateral Lower Extremities  AROM:;10 reps;ankle pumps/circles  -SD     Recorded by  [SD] Diane Ross PTA     Positioning and Restraints    Pre-Treatment Position  sitting in chair/recliner  -RW sitting in chair/recliner  -SD sitting in chair/recliner  -SD    Post Treatment Position bathroom  -RW chair  -SD chair  -SD    In Chair  reclined;call light within reach;encouraged to call for assist;with other staff   MD in room  -SD reclined;call light within reach;encouraged to call for assist;with nsg  -SD    Bathroom sitting;call light within reach;encouraged to call for assist;with family/caregiver  -RW      Recorded by [RW] Amanda Ospina, PTA [SD] Diane Ross, PTA [SD] Diane Ross, PTA      User Key  (r) = Recorded By, (t) = Taken By, (c) = Cosigned By    Initials Name Effective Dates    SD Diane Ross, PTA 01/27/16 -     RW Amanda Ospina, PTA 04/06/16 -                 IP PT Goals       07/01/17 1534          Bed Mobility PT LTG    Bed Mobility PT LTG, Date Established 07/01/17  -CK      Bed Mobility PT LTG, Time to Achieve 5 days  -CK      Bed Mobility PT LTG, Activity Type all bed mobility  -CK      Bed Mobility PT LTG, [place] Level minimum assist (75% patient effort)  -CK      Bed Mobility PT Goal  LTG, Assist Device bed rails  -CK      Transfer Training PT LTG    Transfer Training PT LTG, Date Established 07/01/17  -CK      Transfer Training PT LTG, Time to Achieve 5 days  -CK      Transfer Training PT LTG, Activity Type bed to chair /chair to bed  -CK      Transfer Training PT LTG, [place] Level minimum assist (75% patient effort)  -CK      Transfer Training PT LTG, Assist Device walker, rolling  -CK      Transfer Training 2 PT LTG    Transfer Training PT 2 LTG, Date Established 07/01/17  -CK      Transfer Training PT 2 LTG, Activity Type sit to stand/stand to sit  -CK      Transfer Training PT 2 LTG, [place] Level contact guard assist  -CK      Transfer Training PT 2 LTG, Assist Device walker, rolling  -CK      Gait Training PT LTG    Gait Training Goal PT LTG, Date Established 07/01/17  -CK      Gait Training Goal PT LTG, Time to  Achieve 5 days  -CK      Gait Training Goal PT LTG, [place] Level minimum assist (75% patient effort)  -CK      Gait Training Goal PT LTG, Assist Device walker, rolling  -CK      Gait Training Goal PT LTG, Distance to Achieve 100 feet  -CK        User Key  (r) = Recorded By, (t) = Taken By, (c) = Cosigned By    Initials Name Provider Type    CK Jay Núñez, PT Physical Therapist          Physical Therapy Education     Title: PT OT SLP Therapies (Done)     Topic: Physical Therapy (Done)     Point: Mobility training (Done)    Learning Progress Summary    Learner Readiness Method Response Comment Documented by Status   Patient Acceptance E,D NR,VU   07/10/17 1423 Done    Acceptance E,TB VU  SD 07/09/17 1111 Done    Acceptance E,TB VU,NR  SD 07/08/17 1030 Done    Acceptance E,D VU,NR  MS 07/07/17 1144 Done    Acceptance E VU,NR   07/06/17 1606 Done    Acceptance E,TB,D VU,NR   07/05/17 1141 Done    Acceptance E VU   07/04/17 1000 Done    Acceptance E,D VU,NR  MS 07/03/17 1013 Done    Acceptance E,TB VU   07/02/17 1600 Done    Acceptance E VU Educated on WBAT status  07/01/17 1533 Done   Family Acceptance E,TB VU   07/02/17 1600 Done   Significant Other Acceptance E,D NR,VU   07/10/17 1423 Done               Point: Home exercise program (Done)    Learning Progress Summary    Learner Readiness Method Response Comment Documented by Status   Patient Acceptance E,TB VU  SD 07/09/17 1111 Done    Acceptance E,TB VU,NR  SD 07/08/17 1030 Done    Acceptance E,D VU,NR  MS 07/07/17 1144 Done    Acceptance E VU,NR   07/06/17 1606 Done    Acceptance E,TB,D VU,NR   07/05/17 1141 Done    Acceptance E,D VU,NR  MS 07/03/17 1013 Done    Acceptance E,TB VU   07/02/17 1600 Done    Acceptance E VU   07/02/17 0929 Done   Family Acceptance E,TB VU   07/02/17 1600 Done               Point: Body mechanics (Done)    Learning Progress Summary    Learner Readiness Method Response Comment Documented by Status    Patient Acceptance E,D NR,VU   07/10/17 1423 Done    Acceptance E,TB VU  SD 07/09/17 1111 Done    Acceptance E,TB VU,NR  SD 07/08/17 1030 Done    Acceptance E,D VU,NR  MS 07/07/17 1144 Done    Acceptance E VU,NR   07/06/17 1606 Done    Acceptance E,TB,D VU,NR   07/05/17 1141 Done    Acceptance E,D VU,NR  MS 07/03/17 1013 Done   Significant Other Acceptance E,D NR,VU   07/10/17 1423 Done               Point: Precautions (Done)    Learning Progress Summary    Learner Readiness Method Response Comment Documented by Status   Patient Acceptance E,D NR,VU   07/10/17 1423 Done    Acceptance E,TB VU  SD 07/09/17 1111 Done    Acceptance E,TB VU,NR  SD 07/08/17 1030 Done    Acceptance E,D VU,NR  MS 07/07/17 1144 Done    Acceptance E VU,NR   07/06/17 1606 Done    Acceptance E,TB,D VU,NR   07/05/17 1141 Done    Acceptance E,D VU,NR  MS 07/03/17 1013 Done    Acceptance E,TB VU   07/02/17 1600 Done    Acceptance E VU Educated on WBAT status  07/01/17 1533 Done   Family Acceptance E,TB VU   07/02/17 1600 Done   Significant Other Acceptance E,D NR,VU   07/10/17 1423 Done                      User Key     Initials Effective Dates Name Provider Type Discipline     04/24/15 -  Jay Núñez, PT Physical Therapist PT     06/16/16 -  Babs Jordan, RN Registered Nurse Nurse    MS 12/01/15 -  Efra Arroyo, PT Physical Therapist PT    SD 01/27/16 -  Diane Ross, PTA Technician PT     04/06/16 -  Amanda Ospina PTA Physical Therapy Assistant PT     04/24/15 -  Hang Johnson PTA Physical Therapy Assistant PT                    PT Recommendation and Plan  Anticipated Discharge Disposition: skilled nursing facility  PT Frequency: daily  Plan of Care Review  Plan Of Care Reviewed With: patient  Outcome Summary/Follow up Plan: Pt increased ambulation distance. Verbal cueing still required for safety w/ transfers and mobility. Difficlty w/ LLE clearance and step length due to RLE pain.            Outcome Measures       07/10/17 1400 07/09/17 1100 07/08/17 1000    How much help from another person do you currently need...    Turning from your back to your side while in flat bed without using bedrails? 3  -RW 3  -SD 3  -SD    Moving from lying on back to sitting on the side of a flat bed without bedrails? 3  -RW 3  -SD 3  -SD    Moving to and from a bed to a chair (including a wheelchair)? 3  -RW 3  -SD 3  -SD    Standing up from a chair using your arms (e.g., wheelchair, bedside chair)? 3  -RW 3  -SD 3  -SD    Climbing 3-5 steps with a railing? 2  -RW 2  -SD 2  -SD    To walk in hospital room? 3  -RW 3  -SD 3  -SD    AM-PAC 6 Clicks Score 17  -RW 17  -SD 17  -SD    Functional Assessment    Outcome Measure Options AM-PAC 6 Clicks Basic Mobility (PT)  -RW AM-PAC 6 Clicks Basic Mobility (PT)  -SD AM-PAC 6 Clicks Basic Mobility (PT)  -SD      User Key  (r) = Recorded By, (t) = Taken By, (c) = Cosigned By    Initials Name Provider Type    SD Diane Ross, MICAELA Technician    GIORGI Ospina PTA Physical Therapy Assistant           Time Calculation:         PT Charges       07/10/17 1420          Time Calculation    Start Time 1403  -RW      Stop Time 1418  -RW      Time Calculation (min) 15 min  -RW      PT Received On 07/10/17  -      PT - Next Appointment 07/11/17  -        User Key  (r) = Recorded By, (t) = Taken By, (c) = Cosigned By    Initials Name Provider Type     Amanda Ospina PTA Physical Therapy Assistant          Therapy Charges for Today     Code Description Service Date Service Provider Modifiers Qty    12984811695 HC PT THER PROC EA 15 MIN 7/10/2017 Amanda Ospina PTA GP 1          PT G-Codes  Outcome Measure Options: AM-PAC 6 Clicks Basic Mobility (PT)    Amanda Ospina PTA  7/10/2017

## 2017-07-10 NOTE — PROGRESS NOTES
Adult Nutrition  Assessment/PES    Patient Name:  Demetris Nye  YOB: 1945  MRN: [id number]  Admit Date:  6/30/2017    Assessment Date:  7/10/2017        Reason for Assessment       07/10/17 1318    Reason for Assessment    Reason For Assessment/Visit length of stay    Diagnosis --   unspecified fall                Anthropometrics       07/10/17 1318    Anthropometrics (Special Considerations)    Height Used for Calculations 1.829 m (6')    Weight Used for Calculations 119 kg (263 lb)    RD Calculated IBW 77.6    RD Calculated %     RD Calculated BMI (kg/m2) 35.6    Body Mass Index (BMI)    BMI Grade 35 - 39.9 - obesity - grade II            Labs/Tests/Procedures/Meds       07/10/17 1319    Labs/Tests/Procedures/Meds    Diagnostic Test/Procedure Review reviewed    Labs/Tests Review Reviewed;Glucose;Creat;BUN    Medication Review Reviewed, pertinent   miralax, probiotic, NaCl    Significant Vitals reviewed            Physical Findings       07/10/17 1319    Physical Findings/Assessment    Additional Documentation --   B=20            Estimated/Assessed Needs       07/10/17 1319    Calculation Measurements    Weight Used For Calculations 119 kg (263 lb)    Height Used for Calculations 1.829 m (6')    Estimated/Assessed Energy Needs    Energy Need Method Kcal/kg    kcal/kg 20    20 Kcal/Kg (kcal) 2385.92    Estimated/Assessed Protein Needs    Weight Used for Protein Calculation 119 kg (263 lb)    Protein (gm/kg) 0.8    0.8 Gm Protein (gm) 95.44    Estimated/Assessed Fluid Needs    Fluid Need Method RDA method    RDA Method (mL)  2300            Nutrition Prescription Ordered       07/10/17 1319    Nutrition Prescription PO    Current PO Diet Clear Liquid            Evaluation of Received Nutrient/Fluid Intake       07/10/17 1319    PO Evaluation    Number of Meals 6    % PO Intake 100              Problem/Interventions:        Problem 1       07/10/17 1320    Nutrition Diagnoses Problem 1     Problem 1 Inadequate Nutrient Intake    Etiology (related to) MNT for Treatment/Condition    Signs/Symptoms (evidenced by) Clear Liquid Diet                    Intervention Goal       07/10/17 1320    Intervention Goal    General Maintain nutrition    PO Tolerate PO;Advance diet    Weight Maintain weight            Nutrition Intervention       07/10/17 1320    Nutrition Intervention    RD/Tech Action Follow Tx progress;Care plan reviewd;Interview for preference;Encourage intake;Supplement provided            Nutrition Prescription       07/10/17 1320    Nutrition Prescription PO    PO Prescription Begin/change supplement    Supplement Ensure Clear    Supplement Frequency 3 times a day    New PO Prescription Ordered? Yes            Education/Evaluation       07/10/17 1320    Education    Education Will Instruct as appropriate    Monitor/Evaluation    Monitor Per protocol    Education Follow-up Reinforce PRN          Electronically signed by:  Kelley Katz RD  07/10/17 1:20 PM

## 2017-07-11 ENCOUNTER — APPOINTMENT (OUTPATIENT)
Dept: GENERAL RADIOLOGY | Facility: HOSPITAL | Age: 72
End: 2017-07-11

## 2017-07-11 LAB
ANION GAP SERPL CALCULATED.3IONS-SCNC: 14.1 MMOL/L
BUN BLD-MCNC: 55 MG/DL (ref 8–23)
BUN/CREAT SERPL: 46.6 (ref 7–25)
CALCIUM SPEC-SCNC: 8.1 MG/DL (ref 8.6–10.5)
CHLORIDE SERPL-SCNC: 103 MMOL/L (ref 98–107)
CO2 SERPL-SCNC: 19.9 MMOL/L (ref 22–29)
CREAT BLD-MCNC: 1.18 MG/DL (ref 0.76–1.27)
D-LACTATE SERPL-SCNC: 0.9 MMOL/L (ref 0.5–2)
DEPRECATED RDW RBC AUTO: 46.7 FL (ref 37–54)
EOSINOPHIL # BLD MANUAL: 0.5 10*3/MM3 (ref 0–0.7)
EOSINOPHIL NFR BLD MANUAL: 2 % (ref 0.3–6.2)
ERYTHROCYTE [DISTWIDTH] IN BLOOD BY AUTOMATED COUNT: 13.8 % (ref 11.5–14.5)
GFR SERPL CREATININE-BSD FRML MDRD: 61 ML/MIN/1.73
GLUCOSE BLD-MCNC: 108 MG/DL (ref 65–99)
HCT VFR BLD AUTO: 37.6 % (ref 40.4–52.2)
HGB BLD-MCNC: 12.8 G/DL (ref 13.7–17.6)
LYMPHOCYTES # BLD MANUAL: 1.24 10*3/MM3 (ref 0.9–4.8)
LYMPHOCYTES NFR BLD MANUAL: 10 % (ref 5–12)
LYMPHOCYTES NFR BLD MANUAL: 5 % (ref 19.6–45.3)
MCH RBC QN AUTO: 31.1 PG (ref 27–32.7)
MCHC RBC AUTO-ENTMCNC: 34 G/DL (ref 32.6–36.4)
MCV RBC AUTO: 91.5 FL (ref 79.8–96.2)
METAMYELOCYTES NFR BLD MANUAL: 1 % (ref 0–0)
MONOCYTES # BLD AUTO: 2.48 10*3/MM3 (ref 0.2–1.2)
MYELOCYTES NFR BLD MANUAL: 2 % (ref 0–0)
NEUTROPHILS # BLD AUTO: 19.85 10*3/MM3 (ref 1.9–8.1)
NEUTROPHILS NFR BLD MANUAL: 80 % (ref 42.7–76)
PLAT MORPH BLD: NORMAL
PLATELET # BLD AUTO: 255 10*3/MM3 (ref 140–500)
PMV BLD AUTO: 13.3 FL (ref 6–12)
POTASSIUM BLD-SCNC: 3.6 MMOL/L (ref 3.5–5.2)
RBC # BLD AUTO: 4.11 10*6/MM3 (ref 4.6–6)
RBC MORPH BLD: NORMAL
SCAN SLIDE: NORMAL
SODIUM BLD-SCNC: 137 MMOL/L (ref 136–145)
WBC MORPH BLD: NORMAL
WBC NRBC COR # BLD: 24.81 10*3/MM3 (ref 4.5–10.7)

## 2017-07-11 PROCEDURE — 74000 HC ABDOMEN KUB: CPT

## 2017-07-11 PROCEDURE — 85025 COMPLETE CBC W/AUTO DIFF WBC: CPT | Performed by: HOSPITALIST

## 2017-07-11 PROCEDURE — 25010000002 ENOXAPARIN PER 10 MG: Performed by: ORTHOPAEDIC SURGERY

## 2017-07-11 PROCEDURE — 94799 UNLISTED PULMONARY SVC/PX: CPT

## 2017-07-11 PROCEDURE — 99232 SBSQ HOSP IP/OBS MODERATE 35: CPT | Performed by: INTERNAL MEDICINE

## 2017-07-11 PROCEDURE — 83605 ASSAY OF LACTIC ACID: CPT | Performed by: HOSPITALIST

## 2017-07-11 PROCEDURE — 97110 THERAPEUTIC EXERCISES: CPT

## 2017-07-11 PROCEDURE — 80048 BASIC METABOLIC PNL TOTAL CA: CPT | Performed by: HOSPITALIST

## 2017-07-11 PROCEDURE — 85007 BL SMEAR W/DIFF WBC COUNT: CPT | Performed by: HOSPITALIST

## 2017-07-11 RX ADMIN — SIMETHICONE CHEW TAB 80 MG 80 MG: 80 TABLET ORAL at 08:34

## 2017-07-11 RX ADMIN — SIMETHICONE CHEW TAB 80 MG 80 MG: 80 TABLET ORAL at 17:42

## 2017-07-11 RX ADMIN — VANCOMYCIN 250 MG: KIT at 11:11

## 2017-07-11 RX ADMIN — METRONIDAZOLE 500 MG: 500 INJECTION, SOLUTION INTRAVENOUS at 02:00

## 2017-07-11 RX ADMIN — VANCOMYCIN 250 MG: KIT at 00:00

## 2017-07-11 RX ADMIN — METRONIDAZOLE 500 MG: 500 INJECTION, SOLUTION INTRAVENOUS at 17:42

## 2017-07-11 RX ADMIN — CARVEDILOL 12.5 MG: 12.5 TABLET, FILM COATED ORAL at 08:34

## 2017-07-11 RX ADMIN — VANCOMYCIN 250 MG: KIT at 06:00

## 2017-07-11 RX ADMIN — ATORVASTATIN CALCIUM 10 MG: 10 TABLET, FILM COATED ORAL at 08:34

## 2017-07-11 RX ADMIN — SIMETHICONE CHEW TAB 80 MG 80 MG: 80 TABLET ORAL at 21:00

## 2017-07-11 RX ADMIN — VANCOMYCIN 250 MG: KIT at 17:42

## 2017-07-11 RX ADMIN — Medication 250 MG: at 08:34

## 2017-07-11 RX ADMIN — Medication 250 MG: at 17:42

## 2017-07-11 RX ADMIN — METRONIDAZOLE 500 MG: 500 INJECTION, SOLUTION INTRAVENOUS at 11:11

## 2017-07-11 RX ADMIN — ENOXAPARIN SODIUM 40 MG: 40 INJECTION SUBCUTANEOUS at 08:33

## 2017-07-11 RX ADMIN — SIMETHICONE CHEW TAB 80 MG 80 MG: 80 TABLET ORAL at 11:11

## 2017-07-11 NOTE — PROGRESS NOTES
[facility]  Inpatient Progress Note    Reason for Follow Up:  Colitis    Subjective     Interval History:   Had very large loose BM this am - being cleaned up now.  Feels abdominal pain/distention is much improved.      Current Facility-Administered Medications:   •  acetaminophen (TYLENOL) tablet 650 mg, 650 mg, Oral, Q4H PRN, Manuelito Alba MD, 650 mg at 07/09/17 1637  •  atorvastatin (LIPITOR) tablet 10 mg, 10 mg, Oral, Daily, Jaime Willoughby MD, 10 mg at 07/10/17 0824  •  bisacodyl (DULCOLAX) suppository 10 mg, 10 mg, Rectal, Daily, Demetris Mccoy MD, 10 mg at 07/09/17 1725  •  carvedilol (COREG) tablet 12.5 mg, 12.5 mg, Oral, Daily, Demetris Mccoy MD, 12.5 mg at 07/09/17 0917  •  diphenhydrAMINE (BENADRYL) capsule 25 mg, 25 mg, Oral, Q6H PRN, Manuelito Alba MD, 25 mg at 07/03/17 1309  •  enoxaparin (LOVENOX) syringe 40 mg, 40 mg, Subcutaneous, Daily, Manuelito Alba MD, 40 mg at 07/10/17 0824  •  melatonin tablet 1 mg, 1 mg, Oral, Nightly PRN, Manuelito Alba MD  •  metroNIDAZOLE (FLAGYL) IVPB 500 mg, 500 mg, Intravenous, Q8H, Demetris Mccoy MD, 500 mg at 07/11/17 0200  •  ondansetron (ZOFRAN) tablet 4 mg, 4 mg, Oral, Q6H PRN **OR** ondansetron ODT (ZOFRAN-ODT) disintegrating tablet 4 mg, 4 mg, Oral, Q6H PRN, 4 mg at 07/07/17 1206 **OR** ondansetron (ZOFRAN) injection 4 mg, 4 mg, Intravenous, Q6H PRN, Manuelito Alba MD  •  pneumococcal polysaccharide 23-valent (PNEUMOVAX-23) vaccine 0.5 mL, 0.5 mL, Intramuscular, During Hospitalization, Jaime Willoughby MD  •  polyethylene glycol (MIRALAX) powder 17 g, 17 g, Oral, Daily, Demetris Mccoy MD, 17 g at 07/09/17 0917  •  saccharomyces boulardii (FLORASTOR) capsule 250 mg, 250 mg, Oral, BID, Demetris Mccoy MD, 250 mg at 07/10/17 1805  •  sennosides-docusate sodium (SENOKOT-S) 8.6-50 MG tablet 2 tablet, 2 tablet, Oral, BID, Manuelito Alba MD, 2 tablet at 07/09/17 1725  •  simethicone (MYLICON) chewable  tablet 80 mg, 80 mg, Oral, 4x Daily AC & at Bedtime, Demetris Mccoy MD, 80 mg at 07/10/17 2100  •  sodium chloride 0.9 % flush 1-10 mL, 1-10 mL, Intravenous, PRN, Manuelito Alba MD  •  sodium chloride 0.9 % infusion, 125 mL/hr, Intravenous, Continuous, Demetris Mccoy MD, Last Rate: 125 mL/hr at 07/10/17 1447, 125 mL/hr at 07/10/17 1447  •  vancomycin oral solution 250 mg, 250 mg, Oral, Q6H, Joao Balbuena MD, 250 mg at 07/11/17 0600     Review of Systems:    All systems were reviewed and negative except for:  Gastrointestinal: postitive for  bloating / distention, diarrhea and pain    Objective     Vital Signs  Temp:  [96.8 °F (36 °C)-98.1 °F (36.7 °C)] 97 °F (36.1 °C)  Heart Rate:  [62-72] 66  Resp:  [16-18] 18  BP: ()/(59-78) 103/64  Body mass index is 35.67 kg/(m^2).    Intake/Output Summary (Last 24 hours) at 07/11/17 0831  Last data filed at 07/11/17 0727   Gross per 24 hour   Intake                0 ml   Output              850 ml   Net             -850 ml     I/O this shift:  In: -   Out: 500 [Urine:500]     Physical Exam:   General: patient awake, alert and cooperative   Eyes: Normal lids and lashes, no scleral icterus   Neck: supple, normal ROM   Skin: warm and dry, not jaundiced   Cardiovascular: regular rhythm and rate, no murmurs auscultated   Pulm: clear to auscultation bilaterally, regular and unlabored   Abdomen: soft, nontender, nondistended; normal bowel sounds   Rectal: deferred   Extremities: no rash or edema   Psychiatric: Normal mood and behavior; memory intact     Results Review:     I reviewed the patient's new clinical results.      Results from last 7 days  Lab Units 07/11/17  0419 07/10/17  0411 07/06/17  0442   WBC 10*3/mm3 24.81* 28.33*  --    HEMOGLOBIN g/dL 12.8* 12.7*  --    HEMATOCRIT % 37.6* 37.2*  --    PLATELETS 10*3/mm3 255 231 203       Results from last 7 days  Lab Units 07/11/17  0419 07/10/17  0411 07/09/17  0449   SODIUM mmol/L 137 138 131*    POTASSIUM mmol/L 3.6 4.1 3.8   CHLORIDE mmol/L 103 100 93*   CO2 mmol/L 19.9* 23.2 19.9*   BUN mg/dL 55* 87* 81*   CREATININE mg/dL 1.18 1.58* 1.86*   CALCIUM mg/dL 8.1* 8.6 8.6   GLUCOSE mg/dL 108* 133* 119*         No results found for: LIPASE    Assessment/Plan   Assessment:   1. C diff colitis  2. Leukocytosis - secondary to above - slight improvement  3. Post-op ileus      Plan:   -continue IV flagyl and oral vancomycin  -continue bowel regimen  -will follow     I discussed the patients findings and my recommendations with patient and family.         Patel Ireland M.D.  [facility]  [address] [place] [address]  Office: [phone]

## 2017-07-11 NOTE — PLAN OF CARE
Problem: Patient Care Overview (Adult)  Goal: Plan of Care Review  Outcome: Ongoing (interventions implemented as appropriate)    07/11/17 1758   Coping/Psychosocial Response Interventions   Plan Of Care Reviewed With patient   Patient Care Overview   Progress improving   Outcome Evaluation   Outcome Summary/Follow up Plan Continue with contact spore isolation and IV and PO antibiotics for c. diff. Multiple liquid stools today. KUB completed. Diet upgraded to GI soft. No c/o pain today. Ambulating well with walker. Hx htn, continue to monitor BP.       Goal: Adult Individualization and Mutuality  Outcome: Ongoing (interventions implemented as appropriate)  Goal: Discharge Needs Assessment  Outcome: Ongoing (interventions implemented as appropriate)    Problem: Orthopaedic Fracture (Adult)  Goal: Signs and Symptoms of Listed Potential Problems Will be Absent or Manageable (Orthopaedic Fracture)  Outcome: Ongoing (interventions implemented as appropriate)    Problem: Fall Risk (Adult)  Goal: Absence of Falls  Outcome: Ongoing (interventions implemented as appropriate)    07/11/17 1758   Fall Risk (Adult)   Absence of Falls achieves outcome         Problem: Infection, Risk/Actual (Adult)  Goal: Infection Prevention/Resolution  Outcome: Ongoing (interventions implemented as appropriate)    07/11/17 1758   Infection, Risk/Actual (Adult)   Infection Prevention/Resolution achieves outcome

## 2017-07-11 NOTE — PROGRESS NOTES
"DAILY PROGRESS NOTE  [facility]    Patient Identification:  Name: Demetris Nye  Age: 71 y.o.  Sex: male  :  1945  MRN: [id number]         Primary Care Physician: Rian Morfin MD    Subjective:  Interval History:No new problems today. Some diarrhea and incontinent of stool.    Objective:    Scheduled Meds:    atorvastatin 10 mg Oral Daily   bisacodyl 10 mg Rectal Daily   carvedilol 12.5 mg Oral Daily   enoxaparin 40 mg Subcutaneous Daily   metroNIDAZOLE 500 mg Intravenous Q8H   polyethylene glycol 17 g Oral Daily   saccharomyces boulardii 250 mg Oral BID   sennosides-docusate sodium 2 tablet Oral BID   simethicone 80 mg Oral 4x Daily AC & at Bedtime   vancomycin 250 mg Oral Q6H     Continuous Infusions:    sodium chloride 125 mL/hr Last Rate: 125 mL/hr (07/10/17 1447)       Vital signs in last 24 hours:  Temp:  [96.8 °F (36 °C)-98.1 °F (36.7 °C)] 97 °F (36.1 °C)  Heart Rate:  [62-72] 66  Resp:  [16-18] 18  BP: ()/(59-78) 103/64    Intake/Output:    Intake/Output Summary (Last 24 hours) at 17 1027  Last data filed at 17 0727   Gross per 24 hour   Intake                0 ml   Output              850 ml   Net             -850 ml       Exam:  /64 (BP Location: Right arm, Patient Position: Sitting)  Pulse 66  Temp 97 °F (36.1 °C) (Oral)   Resp 18  Ht 72\" (182.9 cm)  Wt 263 lb (119 kg)  SpO2 96%  BMI 35.67 kg/m2    General Appearance:    Alert, cooperative, no distress   Head:    Normocephalic, without obvious abnormality, atraumatic   Eyes:       Throat:   Lips, tongue, gums normal   Neck:   Supple, symmetrical, trachea midline, no JVD   Lungs:     Clear to auscultation bilaterally, respirations unlabored   Chest Wall:    No tenderness or deformity    Heart:    Regular rate and rhythm, S1 and S2 normal, no murmur,no  Rub or gallop   Abdomen:     Soft, less distended,  non-tender, bowel sounds active, no masses, no organomegaly    Extremities:   " Extremities normal, atraumatic, no cyanosis or edema   Pulses:      Skin:   Skin is warm and dry,  no rashes or palpable lesions   Neurologic:   no focal deficits noted      [unfilled]  Data Review:    Results from last 7 days  Lab Units 07/11/17  0419 07/10/17  0411 07/09/17  0449   SODIUM mmol/L 137 138 131*   POTASSIUM mmol/L 3.6 4.1 3.8   CHLORIDE mmol/L 103 100 93*   CO2 mmol/L 19.9* 23.2 19.9*   BUN mg/dL 55* 87* 81*   CREATININE mg/dL 1.18 1.58* 1.86*   GLUCOSE mg/dL 108* 133* 119*   CALCIUM mg/dL 8.1* 8.6 8.6       Results from last 7 days  Lab Units 07/11/17  0419 07/10/17  0411 07/06/17  0442   WBC 10*3/mm3 24.81* 28.33*  --    HEMOGLOBIN g/dL 12.8* 12.7*  --    HEMATOCRIT % 37.6* 37.2*  --    PLATELETS 10*3/mm3 255 231 203             No results found for: TROPONINT            Invalid input(s): PROT, LABALBU          No results found for: POCGLU        Patient Active Problem List   Diagnosis Code   • Fall W19.XXXA   • Closed right hip fracture S72.001A   • HTN (hypertension) I10   • PVC (premature ventricular contraction) I49.3   • RUDY N17.9   • Hyponatremia E87.1   • Ileus, postoperative K91.3   • Colitis K52.9   • C. difficile colitis A04.7       Assessment:  Principal Problem:    Closed right hip fracture  Active Problems:    Fall    HTN (hypertension)    PVC (premature ventricular contraction)    RUDY    Hyponatremia    Ileus, postoperative    Colitis    C. difficile colitis      Plan:  GI consult noted. Continue antibiotics C Dif, await return of bowel function.    Joao Balbuena MD  7/11/2017  10:27 AM

## 2017-07-11 NOTE — PLAN OF CARE
Problem: Patient Care Overview (Adult)  Goal: Plan of Care Review  Outcome: Ongoing (interventions implemented as appropriate)    07/11/17 8412   Coping/Psychosocial Response Interventions   Plan Of Care Reviewed With patient   Outcome Evaluation   Outcome Summary/Follow up Plan Pt w/ con't progression as expected, but limited by bowel urgency. Pt unable to return to restroom prior moving bowels and required to sit in recliner, where he had bowel accident. Nursing alerted to fact that pt was unable to hold bowels and was without a brief when PTA began working with him.

## 2017-07-11 NOTE — PLAN OF CARE
Problem: Patient Care Overview (Adult)  Goal: Plan of Care Review  Outcome: Ongoing (interventions implemented as appropriate)    07/11/17 0330   Patient Care Overview   Progress progress toward functional goals as expected   Outcome Evaluation   Outcome Summary/Follow up Plan Pt continues with contact/spore isolation for a positive c diff specimen. Pt contiues with IV Flagyl and PO Vanc. Pt tolerated it well. Pt had no complaints of pain this shift. Pt is still very frustrated and just wants to go home. Pt is hoping to go home as soon as possible. Wfe was at bedside and will be here in the AM. Pt educated on monitoring blood pressure related to HTN. PT voiced understanding.       07/11/17 0330   Patient Care Overview   Progress progress toward functional goals as expected   Outcome Evaluation   Outcome Summary/Follow up Plan Pt continues with contact/spore isolation for a positive c diff specimen. Pt contiues with IV Flagyl and PO Vanc. Pt tolerated it well. Pt had no complaints of pain this shift. Pt is still very frustrated and just wants to go home. Pt is hoping to go home as soon as possible. WIfe was at bedside and will be here in the AM.           Goal: Adult Individualization and Mutuality  Outcome: Ongoing (interventions implemented as appropriate)  Goal: Discharge Needs Assessment  Outcome: Ongoing (interventions implemented as appropriate)    Problem: Orthopaedic Fracture (Adult)  Goal: Signs and Symptoms of Listed Potential Problems Will be Absent or Manageable (Orthopaedic Fracture)  Outcome: Ongoing (interventions implemented as appropriate)    Problem: Fall Risk (Adult)  Goal: Absence of Falls  Outcome: Ongoing (interventions implemented as appropriate)    Problem: Infection, Risk/Actual (Adult)  Goal: Infection Prevention/Resolution  Outcome: Ongoing (interventions implemented as appropriate)

## 2017-07-11 NOTE — THERAPY TREATMENT NOTE
Acute Care - Physical Therapy Treatment Note  [facility]     Patient Name: Demetris Nye  : 1945  MRN: [id number]  Today's Date: 2017             Admit Date: 2017    Visit Dx:    ICD-10-CM ICD-9-CM   1. Fall, initial encounter W19.XXXA E888.9   2. Closed right hip fracture, initial encounter S72.001A 820.8   3. Impaired mobility Z74.09 799.89     Patient Active Problem List   Diagnosis   • Fall   • Closed right hip fracture   • HTN (hypertension)   • PVC (premature ventricular contraction)   • RUDY   • Hyponatremia   • Ileus, postoperative   • Colitis   • C. difficile colitis               Adult Rehabilitation Note       17 1607 07/10/17 1400 17 1048    Rehab Assessment/Intervention    Discipline physical therapy assistant  -OSCAR physical therapy assistant  -RW physical therapy assistant  -SD    Document Type therapy note (daily note)  -OSCAR therapy note (daily note)  -RW therapy note (daily note)  -SD    Subjective Information agree to therapy  -OSCAR agree to therapy  -RW agree to therapy  -SD    Patient Effort, Rehab Treatment good  -OSCAR fair  -RW good  -SD    Precautions/Limitations fall precautions;hip precautions- right  -OSCAR fall precautions;hip precautions- right  -RW fall precautions;hip precautions- right  -SD    Recorded by [OSCAR] Hang Johnson PTA [RW] Amanda Ospina PTA [SD] Diane Ross PTA    Pain Assessment    Pain Assessment 0-10  -OSCAR 0-10  -RW No/denies pain  -SD    Pain Score 6  -OSCAR 8  -RW     Post Pain Score 6  -OSCAR      Pain Type Acute pain  -OSCAR Acute pain  -RW     Pain Location Hip  -OSCAR Hip  -RW     Pain Orientation Right  -OSCAR Right  -RW     Pain Intervention(s) Ambulation/increased activity;Repositioned;Rest  -OSCAR Repositioned;Ambulation/increased activity  -RW Repositioned;Ambulation/increased activity;Rest  -SD    Response to Interventions  tolerated  -RW     Recorded by [OSCAR] Hang Johnson PTA [RW] Amanda Ospina PTA [SD] Diane Rsos PTA    Cognitive  Assessment/Intervention    Current Cognitive/Communication Assessment impaired   unable to answer some questions without cueing  -OSCAR functional  -RW functional  -SD    Orientation Status oriented x 4  -OSCAR oriented x 4  -RW oriented x 4  -SD    Follows Commands/Answers Questions 75% of the time;able to follow single-step instructions  -OSCAR 100% of the time;able to follow single-step instructions;needs cueing  -% of the time  -SD    Personal Safety mild impairment  -OSCAR mild impairment  -RW WNL/WFL  -SD    Personal Safety Interventions fall prevention program maintained;gait belt;nonskid shoes/slippers when out of bed  -OSCAR fall prevention program maintained;gait belt;nonskid shoes/slippers when out of bed  -RW fall prevention program maintained;gait belt;nonskid shoes/slippers when out of bed  -SD    Recorded by [OSCAR] Hang Johnson, MICAELA [RW] Amanda Ospina, PTA [SD] Diane Ross PTA    Bed Mobility, Assessment/Treatment    Bed Mob, Sit to Supine, [place] not tested  -OSCAR not tested  -RW not tested  -SD    Bed Mob, Sidelying to Sit, [place] not tested  -OSCAR not tested  -RW     Bed Mobility, Comment  pt up in chair  -RW up in chair  -SD    Recorded by [OSCAR] Hang Johnson, MICAELA [RW] Amanda Ospina, PTA [SD] Diane Ross PTA    Transfer Assessment/Treatment    Transfers, Sit-Stand [place] contact guard assist;verbal cues required  -OSCAR contact guard assist;verbal cues required  -RW contact guard assist;minimum assist (75% patient effort)  -SD    Transfers, Stand-Sit [place] contact guard assist;verbal cues required  -OSCAR minimum assist (75% patient effort);verbal cues required;nonverbal cues required (demo/gesture)  -RW contact guard assist  -SD    Transfers, Sit-Stand-Sit, Assist Device rolling walker  -OSCAR rolling walker  -RW rolling walker  -SD    Transfer, Impairments ROM decreased;strength decreased  -OSCAR  ROM decreased;strength decreased  -SD    Transfer, Comment  Verbal cueing required  for hand placement prior to standing.   -RW     Recorded by [OSCAR] Hang Johnson PTA [RW] Amanda Ospina, MICAELA [SD] Diane Ross PTA    Gait Assessment/Treatment    Gait, [place] Level minimum assist (75% patient effort);verbal cues required;nonverbal cues required (demo/gesture)  -OSCAR contact guard assist;minimum assist (75% patient effort);verbal cues required  -RW contact guard assist;minimum assist (75% patient effort)  -SD    Gait, Assistive Device rolling walker  -OSCAR rolling walker  -RW rolling walker  -SD    Gait, Distance (Feet) 15  -OSCAR 90  -RW 75  -SD    Gait, Gait Deviations antalgic;tamara decreased;forward flexed posture;limb motion velocity decreased;step length decreased;toe-to-floor clearance decreased;weight-shifting ability decreased  -OSCAR forward flexed posture;right:;antalgic;bilateral:;tamara decreased;left:;toe-to-floor clearance decreased;weight-shifting ability decreased  -RW antalgic;tamara decreased;decreased heel strike;step length decreased  -SD    Gait, Safety Issues step length decreased;weight-shifting ability decreased;balance decreased during turns  -OSCAR step length decreased;weight-shifting ability decreased  -RW step length decreased  -SD    Gait, Impairments strength decreased;impaired balance;coordination impaired  -OSCAR  ROM decreased;strength decreased  -SD    Gait, Comment gait distance limited d/t bowel urgency.   -OSCAR Verbal cueing required for LLE step length and clearance w/ ambulation.  -RW x2 standing rest breaks  -SD    Recorded by [OSCAR] Hang Johnson PTA [RW] Amanda Ospina, PTA [SD] Diane Ross PTA    Therapy Exercises    Bilateral Lower Extremities AROM:;10 reps;ankle pumps/circles;LAQ;hip flexion;glut sets  -OSCAR  AROM:;10 reps;ankle pumps/circles  -SD    Recorded by [OSCAR] Hang Johnson PTA  [SD] Diane Ross PTA    Positioning and Restraints    Pre-Treatment Position sitting in chair/recliner  -OSCAR sitting in chair/recliner  -RW sitting in  chair/recliner  -SD    Post Treatment Position chair  -OSCAR bathroom  -RW chair  -SD    In Chair call light within reach;encouraged to call for assist;notified nsg;sitting  -OSCAR  reclined;call light within reach;encouraged to call for assist;with other staff   MD in room  -SD    Bathroom  sitting;call light within reach;encouraged to call for assist;with family/caregiver  -RW     Recorded by [OSCAR] Hang Johnson, PTA [RW] Amanda Ospina, PTA [SD] Diane Ross, PTA      User Key  (r) = Recorded By, (t) = Taken By, (c) = Cosigned By    Initials Name Effective Dates    SD Diane Ross, PTA 01/27/16 -     RW Amanda Ospina, PTA 04/06/16 -     OSCAR Johnson, PTA 04/24/15 -                 IP PT Goals       07/01/17 1534          Bed Mobility PT LTG    Bed Mobility PT LTG, Date Established 07/01/17  -CK      Bed Mobility PT LTG, Time to Achieve 5 days  -CK      Bed Mobility PT LTG, Activity Type all bed mobility  -CK      Bed Mobility PT LTG, [place] Level minimum assist (75% patient effort)  -CK      Bed Mobility PT Goal  LTG, Assist Device bed rails  -CK      Transfer Training PT LTG    Transfer Training PT LTG, Date Established 07/01/17  -CK      Transfer Training PT LTG, Time to Achieve 5 days  -CK      Transfer Training PT LTG, Activity Type bed to chair /chair to bed  -CK      Transfer Training PT LTG, [place] Level minimum assist (75% patient effort)  -CK      Transfer Training PT LTG, Assist Device walker, rolling  -CK      Transfer Training 2 PT LTG    Transfer Training PT 2 LTG, Date Established 07/01/17  -CK      Transfer Training PT 2 LTG, Activity Type sit to stand/stand to sit  -CK      Transfer Training PT 2 LTG, [place] Level contact guard assist  -CK      Transfer Training PT 2 LTG, Assist Device walker, rolling  -CK      Gait Training PT LTG    Gait Training Goal PT LTG, Date Established 07/01/17  -CK      Gait Training Goal PT LTG, Time to Achieve 5 days  -CK      Gait Training  Goal PT LTG, [place] Level minimum assist (75% patient effort)  -CK      Gait Training Goal PT LTG, Assist Device walker, rolling  -CK      Gait Training Goal PT LTG, Distance to Achieve 100 feet  -CK        User Key  (r) = Recorded By, (t) = Taken By, (c) = Cosigned By    Initials Name Provider Type    CK Jay Núñez, PT Physical Therapist          Physical Therapy Education     Title: PT OT SLP Therapies (Active)     Topic: Physical Therapy (Active)     Point: Mobility training (Active)    Learning Progress Summary    Learner Readiness Method Response Comment Documented by Status   Patient Acceptance E NR   07/11/17 1626 Active    Acceptance E,D NR,VU   07/10/17 1423 Done    Acceptance E,TB VU  SD 07/09/17 1111 Done    Acceptance E,TB VU,NR  SD 07/08/17 1030 Done    Acceptance E,D VU,NR  MS 07/07/17 1144 Done    Acceptance E VU,NR   07/06/17 1606 Done    Acceptance E,TB,D VU,NR   07/05/17 1141 Done    Acceptance E VU   07/04/17 1000 Done    Acceptance E,D VU,NR  MS 07/03/17 1013 Done    Acceptance E,TB VU   07/02/17 1600 Done    Acceptance E VU Educated on WBAT status  07/01/17 1533 Done   Family Acceptance E,TB VU   07/02/17 1600 Done   Significant Other Acceptance E,D NR,VU   07/10/17 1423 Done               Point: Home exercise program (Active)    Learning Progress Summary    Learner Readiness Method Response Comment Documented by Status   Patient Acceptance E NR   07/11/17 1626 Active    Acceptance E,TB VU  SD 07/09/17 1111 Done    Acceptance E,TB VU,NR  SD 07/08/17 1030 Done    Acceptance E,D VU,NR  MS 07/07/17 1144 Done    Acceptance E VU,NR   07/06/17 1606 Done    Acceptance E,TB,D VU,NR   07/05/17 1141 Done    Acceptance E,D VU,NR  MS 07/03/17 1013 Done    Acceptance E,TB VU   07/02/17 1600 Done    Acceptance E VU   07/02/17 0929 Done   Family Acceptance E,TB VU   07/02/17 1600 Done               Point: Body mechanics (Active)    Learning Progress Summary    Learner  Readiness Method Response Comment Documented by Status   Patient Acceptance E NR   07/11/17 1626 Active    Acceptance E,D NR,VU   07/10/17 1423 Done    Acceptance E,TB VU  SD 07/09/17 1111 Done    Acceptance E,TB VU,NR  SD 07/08/17 1030 Done    Acceptance E,D VU,NR  MS 07/07/17 1144 Done    Acceptance E VU,NR   07/06/17 1606 Done    Acceptance E,TB,D VU,NR   07/05/17 1141 Done    Acceptance E,D VU,NR  MS 07/03/17 1013 Done   Significant Other Acceptance E,D NR,VU   07/10/17 1423 Done               Point: Precautions (Active)    Learning Progress Summary    Learner Readiness Method Response Comment Documented by Status   Patient Acceptance E NR   07/11/17 1626 Active    Acceptance E,D NR,VU   07/10/17 1423 Done    Acceptance E,TB VU  SD 07/09/17 1111 Done    Acceptance E,TB VU,NR  SD 07/08/17 1030 Done    Acceptance E,D VU,NR  MS 07/07/17 1144 Done    Acceptance E VU,NR   07/06/17 1606 Done    Acceptance E,TB,D VU,NR   07/05/17 1141 Done    Acceptance E,D VU,NR  MS 07/03/17 1013 Done    Acceptance E,TB VU   07/02/17 1600 Done    Acceptance E VU Educated on WBAT status  07/01/17 1533 Done   Family Acceptance E,TB VU   07/02/17 1600 Done   Significant Other Acceptance E,D NR,VU   07/10/17 1423 Done                      User Key     Initials Effective Dates Name Provider Type Discipline     04/24/15 -  Jay Núñez, PT Physical Therapist PT     06/16/16 -  Babs Jordan, RN Registered Nurse Nurse    MS 12/01/15 -  Efra Arroyo, PT Physical Therapist PT    SD 01/27/16 -  Diane Ross, PTA Technician PT     04/06/16 -  Amanda Ospina, PTA Physical Therapy Assistant PT     04/24/15 -  Hang Johnson PTA Physical Therapy Assistant PT                    PT Recommendation and Plan  Anticipated Discharge Disposition: skilled nursing facility  PT Frequency: daily  Plan of Care Review  Plan Of Care Reviewed With: patient  Outcome Summary/Follow up Plan: Pt w/ con't progression as  expected, but limited by bowel urgency.  Pt unable to return to restroom prior moving bowels and required to sit in recliner, where he had bowel accident.  Nursing alerted to fact that pt was unable to hold bowels and was without a brief when PTA began working with him.            Outcome Measures       07/11/17 1600 07/10/17 1400 07/09/17 1100    How much help from another person do you currently need...    Turning from your back to your side while in flat bed without using bedrails? 3  -OSCAR 3  -RW 3  -SD    Moving from lying on back to sitting on the side of a flat bed without bedrails? 3  -OSCAR 3  -RW 3  -SD    Moving to and from a bed to a chair (including a wheelchair)? 3  -OSCAR 3  -RW 3  -SD    Standing up from a chair using your arms (e.g., wheelchair, bedside chair)? 3  -OSCAR 3  -RW 3  -SD    Climbing 3-5 steps with a railing? 2  -OSCAR 2  -RW 2  -SD    To walk in hospital room? 3  -OSCAR 3  -RW 3  -SD    AM-PAC 6 Clicks Score 17  -OSCAR 17  -RW 17  -SD    Functional Assessment    Outcome Measure Options AM-PAC 6 Clicks Basic Mobility (PT)  -OSCAR AM-PAC 6 Clicks Basic Mobility (PT)  -RW AM-PAC 6 Clicks Basic Mobility (PT)  -SD      User Key  (r) = Recorded By, (t) = Taken By, (c) = Cosigned By    Initials Name Provider Type    SD Diane Zakia Ross, PTA Technician     Amanda Ospina, PTA Physical Therapy Assistant    OSCAR Johnson PTA Physical Therapy Assistant           Time Calculation:         PT Charges       07/11/17 1625          Time Calculation    Start Time 1607  -OSCAR      Stop Time 1621  -OSCAR      Time Calculation (min) 14 min  -OSCAR      PT Received On 07/11/17  -OSCAR      PT - Next Appointment 07/12/17  -OSCAR        User Key  (r) = Recorded By, (t) = Taken By, (c) = Cosigned By    Initials Name Provider Type    OSCAR Johnson PTA Physical Therapy Assistant          Therapy Charges for Today     Code Description Service Date Service Provider Modifiers Qty    [id number] HC PT THER PROC EA 15 MIN 7/11/2017 Hang  Alex, MICAELA GP 1          PT G-Codes  Outcome Measure Options: AM-PAC 6 Clicks Basic Mobility (PT)    Hang Johnson PTA  7/11/2017

## 2017-07-11 NOTE — PLAN OF CARE
Problem: Patient Care Overview (Adult)  Goal: Plan of Care Review  Outcome: Ongoing (interventions implemented as appropriate)    07/10/17 1800   Coping/Psychosocial Response Interventions   Plan Of Care Reviewed With patient   Patient Care Overview   Progress improving   Outcome Evaluation   Outcome Summary/Follow up Plan Patient ambulating using walker with x1 assist. Pain is well controlled and patient has refused pain meds today. Patient had x3 BMs today, c/o that they were loose and urgent. Stool specimen sent for culture and C-diff, C-diff resulted positive, vanc started and flagyl continued. GI on case.        Goal: Adult Individualization and Mutuality  Outcome: Ongoing (interventions implemented as appropriate)    07/10/17 0207   Individualization   Patient Specific Goals Increase ambulation distance and continue with good pain control   Patient Specific Interventions Assist with ADLs as needed       Goal: Discharge Needs Assessment  Outcome: Ongoing (interventions implemented as appropriate)    07/10/17 0207 07/10/17 2029   Discharge Needs Assessment   Discharge Facility/Level Of Care Needs --  home with home health   Living Environment   Transportation Available car;family or friend will provide --          Problem: Orthopaedic Fracture (Adult)  Goal: Signs and Symptoms of Listed Potential Problems Will be Absent or Manageable (Orthopaedic Fracture)  Outcome: Ongoing (interventions implemented as appropriate)    07/10/17 1800   Orthopaedic Fracture   Problems Assessed (Orthopaedic Fracture) all   Problems Present (Orthopaedic Fracture) pain;functional deficit/ self-care deficit;gastrointestinal complications;infection         Problem: Fall Risk (Adult)  Goal: Absence of Falls  Outcome: Ongoing (interventions implemented as appropriate)    07/10/17 1800   Fall Risk (Adult)   Absence of Falls achieves outcome         Problem: Infection, Risk/Actual (Adult)  Goal: Identify Related Risk Factors and Signs and  Symptoms  Outcome: Outcome(s) achieved Date Met:  07/10/17    07/10/17 1800   Infection, Risk/Actual   Infection, Risk/Actual: Related Risk Factors prolonged hospitalization;surgery/procedure;medication effects   Signs and Symptoms (Infection, Risk/Actual) pain;lab value changes;cultures positive       Goal: Infection Prevention/Resolution  Outcome: Ongoing (interventions implemented as appropriate)    07/10/17 2029   Infection, Risk/Actual (Adult)   Infection Prevention/Resolution making progress toward outcome

## 2017-07-12 LAB
ANION GAP SERPL CALCULATED.3IONS-SCNC: 12.9 MMOL/L
BACTERIA SPEC AEROBE CULT: NORMAL
BACTERIA SPEC AEROBE CULT: NORMAL
BUN BLD-MCNC: 32 MG/DL (ref 8–23)
BUN/CREAT SERPL: 32.7 (ref 7–25)
CALCIUM SPEC-SCNC: 8 MG/DL (ref 8.6–10.5)
CHLORIDE SERPL-SCNC: 106 MMOL/L (ref 98–107)
CO2 SERPL-SCNC: 21.1 MMOL/L (ref 22–29)
CREAT BLD-MCNC: 0.98 MG/DL (ref 0.76–1.27)
DEPRECATED RDW RBC AUTO: 49.7 FL (ref 37–54)
EOSINOPHIL # BLD MANUAL: 1.12 10*3/MM3 (ref 0–0.7)
EOSINOPHIL NFR BLD MANUAL: 5 % (ref 0.3–6.2)
ERYTHROCYTE [DISTWIDTH] IN BLOOD BY AUTOMATED COUNT: 14 % (ref 11.5–14.5)
GFR SERPL CREATININE-BSD FRML MDRD: 76 ML/MIN/1.73
GLUCOSE BLD-MCNC: 114 MG/DL (ref 65–99)
HCT VFR BLD AUTO: 40.2 % (ref 40.4–52.2)
HGB BLD-MCNC: 13 G/DL (ref 13.7–17.6)
LYMPHOCYTES # BLD MANUAL: 3.79 10*3/MM3 (ref 0.9–4.8)
LYMPHOCYTES NFR BLD MANUAL: 17 % (ref 19.6–45.3)
LYMPHOCYTES NFR BLD MANUAL: 5 % (ref 5–12)
MCH RBC QN AUTO: 31.2 PG (ref 27–32.7)
MCHC RBC AUTO-ENTMCNC: 32.3 G/DL (ref 32.6–36.4)
MCV RBC AUTO: 96.4 FL (ref 79.8–96.2)
METAMYELOCYTES NFR BLD MANUAL: 2 % (ref 0–0)
MONOCYTES # BLD AUTO: 1.12 10*3/MM3 (ref 0.2–1.2)
MYELOCYTES NFR BLD MANUAL: 3 % (ref 0–0)
NEUTROPHILS # BLD AUTO: 15.18 10*3/MM3 (ref 1.9–8.1)
NEUTROPHILS NFR BLD MANUAL: 68 % (ref 42.7–76)
PLAT MORPH BLD: NORMAL
PLATELET # BLD AUTO: 271 10*3/MM3 (ref 140–500)
PMV BLD AUTO: 12.8 FL (ref 6–12)
POTASSIUM BLD-SCNC: 3.2 MMOL/L (ref 3.5–5.2)
POTASSIUM BLD-SCNC: 4.1 MMOL/L (ref 3.5–5.2)
RBC # BLD AUTO: 4.17 10*6/MM3 (ref 4.6–6)
RBC MORPH BLD: NORMAL
SCAN SLIDE: NORMAL
SODIUM BLD-SCNC: 140 MMOL/L (ref 136–145)
WBC MORPH BLD: NORMAL
WBC NRBC COR # BLD: 22.32 10*3/MM3 (ref 4.5–10.7)

## 2017-07-12 PROCEDURE — 99232 SBSQ HOSP IP/OBS MODERATE 35: CPT | Performed by: INTERNAL MEDICINE

## 2017-07-12 PROCEDURE — 84132 ASSAY OF SERUM POTASSIUM: CPT | Performed by: HOSPITALIST

## 2017-07-12 PROCEDURE — 94799 UNLISTED PULMONARY SVC/PX: CPT

## 2017-07-12 PROCEDURE — 85025 COMPLETE CBC W/AUTO DIFF WBC: CPT | Performed by: HOSPITALIST

## 2017-07-12 PROCEDURE — 25010000002 ENOXAPARIN PER 10 MG: Performed by: ORTHOPAEDIC SURGERY

## 2017-07-12 PROCEDURE — 80048 BASIC METABOLIC PNL TOTAL CA: CPT | Performed by: HOSPITALIST

## 2017-07-12 PROCEDURE — 85007 BL SMEAR W/DIFF WBC COUNT: CPT | Performed by: HOSPITALIST

## 2017-07-12 PROCEDURE — 97110 THERAPEUTIC EXERCISES: CPT

## 2017-07-12 RX ORDER — POTASSIUM CHLORIDE 750 MG/1
40 CAPSULE, EXTENDED RELEASE ORAL AS NEEDED
Status: DISCONTINUED | OUTPATIENT
Start: 2017-07-12 | End: 2017-07-14 | Stop reason: HOSPADM

## 2017-07-12 RX ORDER — POTASSIUM CHLORIDE 1.5 G/1.77G
40 POWDER, FOR SOLUTION ORAL AS NEEDED
Status: DISCONTINUED | OUTPATIENT
Start: 2017-07-12 | End: 2017-07-14 | Stop reason: HOSPADM

## 2017-07-12 RX ORDER — POTASSIUM CHLORIDE 7.45 MG/ML
10 INJECTION INTRAVENOUS
Status: DISCONTINUED | OUTPATIENT
Start: 2017-07-12 | End: 2017-07-14 | Stop reason: HOSPADM

## 2017-07-12 RX ADMIN — POTASSIUM CHLORIDE 40 MEQ: 750 CAPSULE, EXTENDED RELEASE ORAL at 12:35

## 2017-07-12 RX ADMIN — ENOXAPARIN SODIUM 40 MG: 40 INJECTION SUBCUTANEOUS at 08:44

## 2017-07-12 RX ADMIN — METRONIDAZOLE 500 MG: 500 INJECTION, SOLUTION INTRAVENOUS at 02:00

## 2017-07-12 RX ADMIN — Medication 250 MG: at 17:09

## 2017-07-12 RX ADMIN — SIMETHICONE CHEW TAB 80 MG 80 MG: 80 TABLET ORAL at 17:09

## 2017-07-12 RX ADMIN — SIMETHICONE CHEW TAB 80 MG 80 MG: 80 TABLET ORAL at 11:17

## 2017-07-12 RX ADMIN — SIMETHICONE CHEW TAB 80 MG 80 MG: 80 TABLET ORAL at 08:45

## 2017-07-12 RX ADMIN — VANCOMYCIN 250 MG: KIT at 00:00

## 2017-07-12 RX ADMIN — SODIUM CHLORIDE 125 ML/HR: 9 INJECTION, SOLUTION INTRAVENOUS at 09:19

## 2017-07-12 RX ADMIN — CARVEDILOL 12.5 MG: 12.5 TABLET, FILM COATED ORAL at 08:44

## 2017-07-12 RX ADMIN — SIMETHICONE CHEW TAB 80 MG 80 MG: 80 TABLET ORAL at 21:59

## 2017-07-12 RX ADMIN — VANCOMYCIN 250 MG: KIT at 06:00

## 2017-07-12 RX ADMIN — ACETAMINOPHEN 650 MG: 325 TABLET ORAL at 15:57

## 2017-07-12 RX ADMIN — Medication 250 MG: at 08:44

## 2017-07-12 RX ADMIN — METRONIDAZOLE 500 MG: 500 INJECTION, SOLUTION INTRAVENOUS at 09:18

## 2017-07-12 RX ADMIN — METRONIDAZOLE 500 MG: 500 INJECTION, SOLUTION INTRAVENOUS at 17:18

## 2017-07-12 RX ADMIN — ATORVASTATIN CALCIUM 10 MG: 10 TABLET, FILM COATED ORAL at 08:44

## 2017-07-12 RX ADMIN — VANCOMYCIN 250 MG: KIT at 11:17

## 2017-07-12 RX ADMIN — POTASSIUM CHLORIDE 40 MEQ: 750 CAPSULE, EXTENDED RELEASE ORAL at 15:57

## 2017-07-12 RX ADMIN — VANCOMYCIN 250 MG: KIT at 17:09

## 2017-07-12 NOTE — PROGRESS NOTES
"DAILY PROGRESS NOTE  [facility]    Patient Identification:  Name: Demetris Nye  Age: 71 y.o.  Sex: male  :  1945  MRN: [id number]         Primary Care Physician: Rian Morfin MD    Subjective:  Interval History:No new problems today. Some diarrhea and incontinent of stool.    Objective:    Scheduled Meds:    atorvastatin 10 mg Oral Daily   bisacodyl 10 mg Rectal Daily   carvedilol 12.5 mg Oral Daily   enoxaparin 40 mg Subcutaneous Daily   metroNIDAZOLE 500 mg Intravenous Q8H   polyethylene glycol 17 g Oral Daily   saccharomyces boulardii 250 mg Oral BID   sennosides-docusate sodium 2 tablet Oral BID   simethicone 80 mg Oral 4x Daily AC & at Bedtime   vancomycin 250 mg Oral Q6H     Continuous Infusions:    sodium chloride 125 mL/hr Last Rate: 125 mL/hr (17 0919)       Vital signs in last 24 hours:  Temp:  [97.2 °F (36.2 °C)-99 °F (37.2 °C)] 97.2 °F (36.2 °C)  Heart Rate:  [62-70] 62  Resp:  [18-20] 20  BP: ()/(60-70) 118/70    Intake/Output:  No intake or output data in the 24 hours ending 17 1042    Exam:  /70 (BP Location: Right arm, Patient Position: Sitting)  Pulse 62  Temp 97.2 °F (36.2 °C) (Oral)   Resp 20  Ht 72\" (182.9 cm)  Wt 263 lb (119 kg)  SpO2 95%  BMI 35.67 kg/m2    General Appearance:    Alert, cooperative, no distress   Head:    Normocephalic, without obvious abnormality, atraumatic   Eyes:       Throat:   Lips, tongue, gums normal   Neck:   Supple, symmetrical, trachea midline, no JVD   Lungs:     Clear to auscultation bilaterally, respirations unlabored   Chest Wall:    No tenderness or deformity    Heart:    Regular rate and rhythm, S1 and S2 normal, no murmur,no  Rub or gallop   Abdomen:     Soft, less distended,  non-tender, bowel sounds active, no masses, no organomegaly    Extremities:   Extremities normal, atraumatic, no cyanosis or edema   Pulses:      Skin:   Skin is warm and dry,  no rashes or palpable lesions "   Neurologic:   no focal deficits noted      [unfilled]  Data Review:    Results from last 7 days  Lab Units 07/12/17  0430 07/11/17  0419 07/10/17  0411   SODIUM mmol/L 140 137 138   POTASSIUM mmol/L 3.2* 3.6 4.1   CHLORIDE mmol/L 106 103 100   CO2 mmol/L 21.1* 19.9* 23.2   BUN mg/dL 32* 55* 87*   CREATININE mg/dL 0.98 1.18 1.58*   GLUCOSE mg/dL 114* 108* 133*   CALCIUM mg/dL 8.0* 8.1* 8.6       Results from last 7 days  Lab Units 07/12/17  0430 07/11/17  0419 07/10/17  0411   WBC 10*3/mm3 22.32* 24.81* 28.33*   HEMOGLOBIN g/dL 13.0* 12.8* 12.7*   HEMATOCRIT % 40.2* 37.6* 37.2*   PLATELETS 10*3/mm3 271 255 231             No results found for: TROPONINT            Invalid input(s): PROT, LABALBU          No results found for: POCGLU        Patient Active Problem List   Diagnosis Code   • Fall W19.XXXA   • Closed right hip fracture S72.001A   • HTN (hypertension) I10   • PVC (premature ventricular contraction) I49.3   • RUDY N17.9   • Hyponatremia E87.1   • Ileus, postoperative K91.3   • Colitis K52.9   • C. difficile colitis A04.7       Assessment:  Principal Problem:    Closed right hip fracture  Active Problems:    Fall    HTN (hypertension)    PVC (premature ventricular contraction)    RUDY    Hyponatremia    Ileus, postoperative    Colitis    C. difficile colitis      Plan:  GI consult noted. Continue antibiotics C Dif, await return of bowel function. Replace K +.    Joao Balbuena MD  7/12/2017  10:42 AM

## 2017-07-12 NOTE — PROGRESS NOTES
[facility]  Inpatient Progress Note    Reason for Follow Up:  Colitis    Subjective     Interval History:   States he feels overall better today.  2-3 BMs since yesterday morning.  Tolerating oral intake.      Current Facility-Administered Medications:   •  acetaminophen (TYLENOL) tablet 650 mg, 650 mg, Oral, Q4H PRN, Manuelito Alba MD, 650 mg at 07/09/17 1637  •  atorvastatin (LIPITOR) tablet 10 mg, 10 mg, Oral, Daily, Jaime Willoughby MD, 10 mg at 07/12/17 0844  •  bisacodyl (DULCOLAX) suppository 10 mg, 10 mg, Rectal, Daily, Demetris Mccoy MD, 10 mg at 07/09/17 1725  •  carvedilol (COREG) tablet 12.5 mg, 12.5 mg, Oral, Daily, Demetris Mccoy MD, 12.5 mg at 07/12/17 0844  •  diphenhydrAMINE (BENADRYL) capsule 25 mg, 25 mg, Oral, Q6H PRN, Manuelito Alba MD, 25 mg at 07/03/17 1309  •  enoxaparin (LOVENOX) syringe 40 mg, 40 mg, Subcutaneous, Daily, Manuelito Alba MD, 40 mg at 07/12/17 0844  •  melatonin tablet 1 mg, 1 mg, Oral, Nightly PRN, Manuelito Alba MD  •  metroNIDAZOLE (FLAGYL) IVPB 500 mg, 500 mg, Intravenous, Q8H, Demetris Mccoy MD, 500 mg at 07/12/17 0918  •  ondansetron (ZOFRAN) tablet 4 mg, 4 mg, Oral, Q6H PRN **OR** ondansetron ODT (ZOFRAN-ODT) disintegrating tablet 4 mg, 4 mg, Oral, Q6H PRN, 4 mg at 07/07/17 1206 **OR** ondansetron (ZOFRAN) injection 4 mg, 4 mg, Intravenous, Q6H PRN, Manuelito Alba MD  •  pneumococcal polysaccharide 23-valent (PNEUMOVAX-23) vaccine 0.5 mL, 0.5 mL, Intramuscular, During Hospitalization, Jaime Willoughby MD  •  polyethylene glycol (MIRALAX) powder 17 g, 17 g, Oral, Daily, Demetris Mccoy MD, 17 g at 07/09/17 0917  •  saccharomyces boulardii (FLORASTOR) capsule 250 mg, 250 mg, Oral, BID, Demetris Mccoy MD, 250 mg at 07/12/17 0844  •  sennosides-docusate sodium (SENOKOT-S) 8.6-50 MG tablet 2 tablet, 2 tablet, Oral, BID, Manuelito Alba MD, 2 tablet at 07/09/17 1725  •  simethicone (MYLICON) chewable tablet 80  mg, 80 mg, Oral, 4x Daily AC & at Bedtime, Demetris Mccoy MD, 80 mg at 07/12/17 0845  •  sodium chloride 0.9 % flush 1-10 mL, 1-10 mL, Intravenous, PRN, Manuelito Alba MD  •  sodium chloride 0.9 % infusion, 125 mL/hr, Intravenous, Continuous, Demetris Mccoy MD, Last Rate: 125 mL/hr at 07/12/17 0919, 125 mL/hr at 07/12/17 0919  •  vancomycin oral solution 250 mg, 250 mg, Oral, Q6H, Joao Balbuena MD, 250 mg at 07/12/17 0600     Review of Systems:    All systems were reviewed and negative except for:  Gastrointestinal: postitive for  bloating / distention, diarrhea and pain    Objective     Vital Signs  Temp:  [97.2 °F (36.2 °C)-99 °F (37.2 °C)] 97.2 °F (36.2 °C)  Heart Rate:  [62-70] 62  Resp:  [18-20] 20  BP: ()/(60-70) 118/70  Body mass index is 35.67 kg/(m^2).  No intake or output data in the 24 hours ending 07/12/17 0946        Physical Exam:   General: patient awake, alert and cooperative, non-toxic   Eyes: Normal lids and lashes, no scleral icterus   Neck: supple, normal ROM   Skin: warm and dry, not jaundiced   Cardiovascular: regular rhythm and rate, no murmurs auscultated   Pulm: clear to auscultation bilaterally, regular and unlabored   Abdomen: mild distention but no significant TTP,  normal bowel sounds   Rectal: deferred   Extremities: no rash or edema   Psychiatric: Normal mood and behavior; memory intact     Results Review:     I reviewed the patient's new clinical results.      Results from last 7 days  Lab Units 07/12/17  0430 07/11/17  0419 07/10/17  0411   WBC 10*3/mm3 22.32* 24.81* 28.33*   HEMOGLOBIN g/dL 13.0* 12.8* 12.7*   HEMATOCRIT % 40.2* 37.6* 37.2*   PLATELETS 10*3/mm3 271 255 231       Results from last 7 days  Lab Units 07/12/17 0430 07/11/17 0419 07/10/17  0411   SODIUM mmol/L 140 137 138   POTASSIUM mmol/L 3.2* 3.6 4.1   CHLORIDE mmol/L 106 103 100   CO2 mmol/L 21.1* 19.9* 23.2   BUN mg/dL 32* 55* 87*   CREATININE mg/dL 0.98 1.18 1.58*   CALCIUM mg/dL 8.0*  8.1* 8.6   GLUCOSE mg/dL 114* 108* 133*         No results found for: LIPASE    Assessment/Plan   Assessment:   1. C diff colitis  2. Leukocytosis - secondary to above - slowly improving.  Remains afebrile  3. Abnormal KUB - ? Post-op/infectious ileus.  He is having regular stool and abdominal exam in benign     Plan:   -continue IV flagyl and oral vancomycin  -serial KUB  -monitor WBC  -will continue to follow closely    I discussed the patients findings and my recommendations with patient and family.         Patel Ireland M.D.  [facility]  [address] [place] [address]  Office: [phone]

## 2017-07-12 NOTE — PLAN OF CARE
Problem: Patient Care Overview (Adult)  Goal: Plan of Care Review  Outcome: Ongoing (interventions implemented as appropriate)    07/12/17 0249   Coping/Psychosocial Response Interventions   Plan Of Care Reviewed With patient   Patient Care Overview   Progress progress toward functional goals as expected   Outcome Evaluation   Outcome Summary/Follow up Plan Pt continues on contact/spore precautions related to CDIFF. Pt continues with IV FLagyl and PO Vancomycin. Pt tolerated it well. Pt still is very frustrated and would like to go home. Pt educated on importance of monitoring blood pressure related to HTN. PT voiced undersrtanding. Pt also educated on proper hand washing technique and isolation precautions.       Goal: Adult Individualization and Mutuality  Outcome: Ongoing (interventions implemented as appropriate)  Goal: Discharge Needs Assessment  Outcome: Ongoing (interventions implemented as appropriate)    Problem: Orthopaedic Fracture (Adult)  Goal: Signs and Symptoms of Listed Potential Problems Will be Absent or Manageable (Orthopaedic Fracture)  Outcome: Ongoing (interventions implemented as appropriate)    Problem: Fall Risk (Adult)  Goal: Absence of Falls  Outcome: Ongoing (interventions implemented as appropriate)    Problem: Infection, Risk/Actual (Adult)  Goal: Infection Prevention/Resolution  Outcome: Ongoing (interventions implemented as appropriate)

## 2017-07-12 NOTE — PLAN OF CARE
Problem: Patient Care Overview (Adult)  Goal: Plan of Care Review  Outcome: Ongoing (interventions implemented as appropriate)    07/12/17 1502   Coping/Psychosocial Response Interventions   Plan Of Care Reviewed With patient   Patient Care Overview   Progress no change   Outcome Evaluation   Outcome Summary/Follow up Plan patient is in isolation for C-Diff, pt slept most of the shidt, awaitning return of normal bowel function.HTn controlled       Goal: Discharge Needs Assessment  Outcome: Ongoing (interventions implemented as appropriate)    07/12/17 1502   Discharge Needs Assessment   Concerns To Be Addressed no discharge needs identified         Problem: Orthopaedic Fracture (Adult)  Goal: Signs and Symptoms of Listed Potential Problems Will be Absent or Manageable (Orthopaedic Fracture)  Outcome: Ongoing (interventions implemented as appropriate)    07/12/17 1502   Orthopaedic Fracture   Problems Assessed (Orthopaedic Fracture) all   Problems Present (Orthopaedic Fracture) functional deficit/ self-care deficit         Problem: Fall Risk (Adult)  Goal: Absence of Falls  Outcome: Ongoing (interventions implemented as appropriate)    Problem: Infection, Risk/Actual (Adult)  Goal: Infection Prevention/Resolution  Outcome: Ongoing (interventions implemented as appropriate)    07/12/17 1502   Infection, Risk/Actual (Adult)   Infection Prevention/Resolution making progress toward outcome

## 2017-07-12 NOTE — PLAN OF CARE
Problem: Patient Care Overview (Adult)  Goal: Plan of Care Review  Outcome: Ongoing (interventions implemented as appropriate)    07/12/17 1721   Coping/Psychosocial Response Interventions   Plan Of Care Reviewed With patient   Patient Care Overview   Progress progress toward functional goals is gradual   Outcome Evaluation   Outcome Summary/Follow up Plan Pt's ability to participate with therapy is limited by urgency to have bowel movement.

## 2017-07-12 NOTE — THERAPY TREATMENT NOTE
Acute Care - Physical Therapy Treatment Note  [facility]     Patient Name: Demetris Nye  : 1945  MRN: [id number]  Today's Date: 2017             Admit Date: 2017    Visit Dx:    ICD-10-CM ICD-9-CM   1. Fall, initial encounter W19.XXXA E888.9   2. Closed right hip fracture, initial encounter S72.001A 820.8   3. Impaired mobility Z74.09 799.89     Patient Active Problem List   Diagnosis   • Fall   • Closed right hip fracture   • HTN (hypertension)   • PVC (premature ventricular contraction)   • RUDY   • Hyponatremia   • Ileus, postoperative   • Colitis   • C. difficile colitis               Adult Rehabilitation Note       17 1700 17 1607 07/10/17 1400    Rehab Assessment/Intervention    Discipline physical therapist  -EF physical therapy assistant  -OSCAR physical therapy assistant  -RW    Document Type therapy note (daily note)  -EF therapy note (daily note)  -OSCAR therapy note (daily note)  -RW    Subjective Information agree to therapy   states that he has urgency for bowel movements  -EF agree to therapy  -OSCAR agree to therapy  -RW    Patient Effort, Rehab Treatment fair  -EF good  -OSCAR fair  -RW    Symptoms Noted During/After Treatment --   pt had sudden onset of needing to have bowel movement  -EF      Precautions/Limitations fall precautions   contact precautions  -EF fall precautions;hip precautions- right  -OSCAR fall precautions;hip precautions- right  -RW    Recorded by [EF] Amelia Dorman, PT [OSCAR] Hang Johnson, PTA [RW] Amanda Ospina, MICAELA    Pain Assessment    Pain Assessment No/denies pain  -EF 0-10  -OSCAR 0-10  -RW    Pain Score  6  -OSCAR 8  -RW    Post Pain Score  6  -OSCAR     Pain Type  Acute pain  -OSCAR Acute pain  -RW    Pain Location  Hip  -OSCAR Hip  -RW    Pain Orientation  Right  -OSCAR Right  -RW    Pain Intervention(s)  Ambulation/increased activity;Repositioned;Rest  -OSCAR Repositioned;Ambulation/increased activity  -RW    Response to Interventions   tolerated  -RW    Recorded  by [EF] Amelia Dorman, PT [OSCAR] Hang Johnson PTA [RW] Amanda Ospina PTA    Cognitive Assessment/Intervention    Current Cognitive/Communication Assessment  impaired   unable to answer some questions without cueing  -OSCAR functional  -RW    Orientation Status  oriented x 4  -OSCAR oriented x 4  -RW    Follows Commands/Answers Questions  75% of the time;able to follow single-step instructions  -OSCAR 100% of the time;able to follow single-step instructions;needs cueing  -RW    Personal Safety  mild impairment  -OSCAR mild impairment  -RW    Personal Safety Interventions  fall prevention program maintained;gait belt;nonskid shoes/slippers when out of bed  -OSCAR fall prevention program maintained;gait belt;nonskid shoes/slippers when out of bed  -RW    Recorded by  [OSCAR] Hang Johnson PTA [RW] Amanda Ospina PTA    Bed Mobility, Assessment/Treatment    Bed Mob, Sit to Supine, [place]  not tested  -OSCAR not tested  -RW    Bed Mob, Sidelying to Sit, [place]  not tested  -OSCAR not tested  -RW    Bed Mobility, Comment up in chair  -EF  pt up in chair  -RW    Recorded by [EF] Amelia Dorman, PT [OSCAR] Hang Johnson PTA [RW] Amanda Ospina PTA    Transfer Assessment/Treatment    Transfers, Sit-Stand [place] verbal cues required;contact guard assist  -EF contact guard assist;verbal cues required  -OSCAR contact guard assist;verbal cues required  -RW    Transfers, Stand-Sit [place] verbal cues required;contact guard assist  -EF contact guard assist;verbal cues required  -OSCAR minimum assist (75% patient effort);verbal cues required;nonverbal cues required (demo/gesture)  -RW    Transfers, Sit-Stand-Sit, Assist Device rolling walker  -EF rolling walker  -OSCAR rolling walker  -RW    Transfer, Impairments  ROM decreased;strength decreased  -OSCAR     Transfer, Comment   Verbal cueing required for hand placement prior to standing.   -RW    Recorded by [EF] Amelia Dorman, PT [OSCAR] Hang Johnson PTA [RW] Amanda Ospina PTA     Gait Assessment/Treatment    Gait, [place] Level contact guard assist;minimum assist (75% patient effort)  -EF minimum assist (75% patient effort);verbal cues required;nonverbal cues required (demo/gesture)  -OSCAR contact guard assist;minimum assist (75% patient effort);verbal cues required  -RW    Gait, Assistive Device rolling walker  -EF rolling walker  -OSCAR rolling walker  -RW    Gait, Distance (Feet) 10   to bathroom-pt had to have urgent BM & was seated on toilet  -EF 15  -OSCAR 90  -RW    Gait, Gait Deviations antalgic;tamara decreased;forward flexed posture  -EF antalgic;tamara decreased;forward flexed posture;limb motion velocity decreased;step length decreased;toe-to-floor clearance decreased;weight-shifting ability decreased  -OSCAR forward flexed posture;right:;antalgic;bilateral:;tamara decreased;left:;toe-to-floor clearance decreased;weight-shifting ability decreased  -RW    Gait, Safety Issues  step length decreased;weight-shifting ability decreased;balance decreased during turns  -OSCAR step length decreased;weight-shifting ability decreased  -RW    Gait, Impairments  strength decreased;impaired balance;coordination impaired  -OSCAR     Gait, Comment unable to ambulate further due to urgency to have BM  -EF gait distance limited d/t bowel urgency.   -OSCAR Verbal cueing required for LLE step length and clearance w/ ambulation.  -RW    Recorded by [EF] Amelia Dorman PT [OSCAR] Hang Johnson PTA [RW] Amanda Ospina PTA    Therapy Exercises    Bilateral Lower Extremities  AROM:;10 reps;ankle pumps/circles;LAQ;hip flexion;glut sets  -OSCAR     Recorded by  [OSCAR] Hang Johnson PTA     Positioning and Restraints    Pre-Treatment Position sitting in chair/recliner  -EF sitting in chair/recliner  -OSCAR sitting in chair/recliner  -RW    Post Treatment Position bathroom  -EF chair  -OSCAR bathroom  -RW    In Chair  call light within reach;encouraged to call for assist;notified nsg;sitting  -OSCAR     Bathroom with nsg   to be  cleaned up  -EF  sitting;call light within reach;encouraged to call for assist;with family/caregiver  -RW    Recorded by [EF] Amelia Dorman, PT [OSCAR] Hang Johnson, PTA [RW] Amanda Ospina, MICAELA      User Key  (r) = Recorded By, (t) = Taken By, (c) = Cosigned By    Initials Name Effective Dates    EF Amelia Dorman, PT 04/24/15 -     RW Amanda Ospina, PTA 04/06/16 -     OSCAR Hang Johnson, PTA 04/24/15 -                 IP PT Goals       07/01/17 1534          Bed Mobility PT LTG    Bed Mobility PT LTG, Date Established 07/01/17  -CK      Bed Mobility PT LTG, Time to Achieve 5 days  -CK      Bed Mobility PT LTG, Activity Type all bed mobility  -CK      Bed Mobility PT LTG, [place] Level minimum assist (75% patient effort)  -CK      Bed Mobility PT Goal  LTG, Assist Device bed rails  -CK      Transfer Training PT LTG    Transfer Training PT LTG, Date Established 07/01/17  -CK      Transfer Training PT LTG, Time to Achieve 5 days  -CK      Transfer Training PT LTG, Activity Type bed to chair /chair to bed  -CK      Transfer Training PT LTG, [place] Level minimum assist (75% patient effort)  -CK      Transfer Training PT LTG, Assist Device walker, rolling  -CK      Transfer Training 2 PT LTG    Transfer Training PT 2 LTG, Date Established 07/01/17  -CK      Transfer Training PT 2 LTG, Activity Type sit to stand/stand to sit  -CK      Transfer Training PT 2 LTG, [place] Level contact guard assist  -CK      Transfer Training PT 2 LTG, Assist Device walker, rolling  -CK      Gait Training PT LTG    Gait Training Goal PT LTG, Date Established 07/01/17  -CK      Gait Training Goal PT LTG, Time to Achieve 5 days  -CK      Gait Training Goal PT LTG, [place] Level minimum assist (75% patient effort)  -CK      Gait Training Goal PT LTG, Assist Device walker, rolling  -CK      Gait Training Goal PT LTG, Distance to Achieve 100 feet  -CK        User Key  (r) = Recorded By, (t) = Taken By, (c) = Cosigned By     Initials Name Provider Type    MARILOU LOPEZ [place], PT Physical Therapist          Physical Therapy Education     Title: PT OT SLP Therapies (Active)     Topic: Physical Therapy (Active)     Point: Mobility training (Active)    Learning Progress Summary    Learner Readiness Method Response Comment Documented by Status   Patient Acceptance E NR   07/12/17 1721 Active    Acceptance E NR  OSCAR 07/11/17 1626 Active    Acceptance E,D NR,VU   07/10/17 1423 Done    Acceptance E,TB VU  SD 07/09/17 1111 Done    Acceptance E,TB VU,NR  SD 07/08/17 1030 Done    Acceptance E,D VU,NR  MS 07/07/17 1144 Done    Acceptance E VU,NR   07/06/17 1606 Done    Acceptance E,TB,D VU,NR   07/05/17 1141 Done    Acceptance E VU   07/04/17 1000 Done    Acceptance E,D VU,NR  MS 07/03/17 1013 Done    Acceptance E,TB VU   07/02/17 1600 Done    Acceptance E VU Educated on WBAT status  07/01/17 1533 Done   Family Acceptance E,TB VU   07/02/17 1600 Done   Significant Other Acceptance E,D NR,VU   07/10/17 1423 Done               Point: Home exercise program (Active)    Learning Progress Summary    Learner Readiness Method Response Comment Documented by Status   Patient Acceptance E NR  OSCAR 07/11/17 1626 Active    Acceptance E,TB VU  SD 07/09/17 1111 Done    Acceptance E,TB VU,NR  SD 07/08/17 1030 Done    Acceptance E,D VU,NR  MS 07/07/17 1144 Done    Acceptance E VU,NR   07/06/17 1606 Done    Acceptance E,TB,D VU,NR   07/05/17 1141 Done    Acceptance E,D VU,NR  MS 07/03/17 1013 Done    Acceptance E,TB VU   07/02/17 1600 Done    Acceptance E VU   07/02/17 0929 Done   Family Acceptance E,TB VU   07/02/17 1600 Done               Point: Body mechanics (Active)    Learning Progress Summary    Learner Readiness Method Response Comment Documented by Status   Patient Acceptance E NR   07/11/17 1626 Active    Acceptance E,D NR,VU   07/10/17 1423 Done    Acceptance E,TB VU  SD 07/09/17 1111 Done    Acceptance E,TB VU,NR  SD 07/08/17  1030 Done    Acceptance E,D VU,NR  MS 07/07/17 1144 Done    Acceptance E VU,NR  OSCAR 07/06/17 1606 Done    Acceptance E,TB,D VU,NR   07/05/17 1141 Done    Acceptance E,D VU,NR  MS 07/03/17 1013 Done   Significant Other Acceptance E,D NR,VU   07/10/17 1423 Done               Point: Precautions (Active)    Learning Progress Summary    Learner Readiness Method Response Comment Documented by Status   Patient Acceptance E NR  OSCAR 07/11/17 1626 Active    Acceptance E,D NR,VU   07/10/17 1423 Done    Acceptance E,TB VU  SD 07/09/17 1111 Done    Acceptance E,TB VU,NR  SD 07/08/17 1030 Done    Acceptance E,D VU,NR  MS 07/07/17 1144 Done    Acceptance E VU,NR   07/06/17 1606 Done    Acceptance E,TB,D VU,NR   07/05/17 1141 Done    Acceptance E,D VU,NR  MS 07/03/17 1013 Done    Acceptance E,TB VU   07/02/17 1600 Done    Acceptance E VU Educated on WBAT status  07/01/17 1533 Done   Family Acceptance E,TB VU   07/02/17 1600 Done   Significant Other Acceptance E,D NR,VU   07/10/17 1423 Done                      User Key     Initials Effective Dates Name Provider Type Discipline     04/24/15 -  Amelia Dorman, PT Physical Therapist PT     04/24/15 -  Jay Núñez, PT Physical Therapist PT     06/16/16 -  Babs Jordan, RN Registered Nurse Nurse    MS 12/01/15 -  Efra Arroyo, PT Physical Therapist PT    SD 01/27/16 -  Diane Ross, PTA Technician PT     04/06/16 -  Amanda Ospina, PTA Physical Therapy Assistant PT     04/24/15 -  Hang Johnson PTA Physical Therapy Assistant PT                    PT Recommendation and Plan  Anticipated Discharge Disposition: skilled nursing facility  PT Frequency: daily  Plan of Care Review  Plan Of Care Reviewed With: patient  Progress: progress toward functional goals is gradual  Outcome Summary/Follow up Plan: Pt's ability to participate with therapy is limited by urgency to have bowel movement.          Outcome Measures       07/12/17 1700 07/11/17 1600  07/10/17 1400    How much help from another person do you currently need...    Turning from your back to your side while in flat bed without using bedrails? 3  -EF 3  -OSCAR 3  -RW    Moving from lying on back to sitting on the side of a flat bed without bedrails? 3  -EF 3  -OSCAR 3  -RW    Moving to and from a bed to a chair (including a wheelchair)? 3  -EF 3  -OSCAR 3  -RW    Standing up from a chair using your arms (e.g., wheelchair, bedside chair)? 3  -EF 3  -OSCAR 3  -RW    Climbing 3-5 steps with a railing? 2  -EF 2  -OSCAR 2  -RW    To walk in hospital room? 3  -EF 3  -OSCAR 3  -RW    AM-PAC 6 Clicks Score 17  -EF 17  -OSCAR 17  -RW    Functional Assessment    Outcome Measure Options AM-PAC 6 Clicks Basic Mobility (PT)  -EF AM-PAC 6 Clicks Basic Mobility (PT)  -OSCAR AM-PAC 6 Clicks Basic Mobility (PT)  -RW      User Key  (r) = Recorded By, (t) = Taken By, (c) = Cosigned By    Initials Name Provider Type    EF Amelia Dorman, PT Physical Therapist    GIORGI Ospina, PTA Physical Therapy Assistant    OSCAR Johnson, PTA Physical Therapy Assistant           Time Calculation:         PT Charges       07/12/17 1725          Time Calculation    Start Time 1644  -EF      Stop Time 1702  -EF      Time Calculation (min) 18 min  -EF      PT Received On 07/12/17  -EF      PT - Next Appointment 07/13/17  -EF        User Key  (r) = Recorded By, (t) = Taken By, (c) = Cosigned By    Initials Name Provider Type    VIJAY Dorman, PT Physical Therapist          Therapy Charges for Today     Code Description Service Date Service Provider Modifiers Qty    [id number] HC PT THER PROC EA 15 MIN 7/12/2017 Amelia Dorman, PT GP 1          PT G-Codes  Outcome Measure Options: AM-PAC 6 Clicks Basic Mobility (PT)    Amelia Dorman, PT  7/12/2017

## 2017-07-13 ENCOUNTER — APPOINTMENT (OUTPATIENT)
Dept: GENERAL RADIOLOGY | Facility: HOSPITAL | Age: 72
End: 2017-07-13

## 2017-07-13 LAB
ANION GAP SERPL CALCULATED.3IONS-SCNC: 13 MMOL/L
BUN BLD-MCNC: 15 MG/DL (ref 8–23)
BUN/CREAT SERPL: 17 (ref 7–25)
CALCIUM SPEC-SCNC: 7.8 MG/DL (ref 8.6–10.5)
CHLORIDE SERPL-SCNC: 105 MMOL/L (ref 98–107)
CO2 SERPL-SCNC: 21 MMOL/L (ref 22–29)
CREAT BLD-MCNC: 0.88 MG/DL (ref 0.76–1.27)
DEPRECATED RDW RBC AUTO: 48.4 FL (ref 37–54)
EOSINOPHIL # BLD MANUAL: 0.19 10*3/MM3 (ref 0–0.7)
EOSINOPHIL NFR BLD MANUAL: 1 % (ref 0.3–6.2)
ERYTHROCYTE [DISTWIDTH] IN BLOOD BY AUTOMATED COUNT: 14.1 % (ref 11.5–14.5)
GFR SERPL CREATININE-BSD FRML MDRD: 86 ML/MIN/1.73
GLUCOSE BLD-MCNC: 106 MG/DL (ref 65–99)
HCT VFR BLD AUTO: 38.9 % (ref 40.4–52.2)
HGB BLD-MCNC: 12.6 G/DL (ref 13.7–17.6)
LYMPHOCYTES # BLD MANUAL: 1.12 10*3/MM3 (ref 0.9–4.8)
LYMPHOCYTES NFR BLD MANUAL: 6 % (ref 19.6–45.3)
LYMPHOCYTES NFR BLD MANUAL: 7 % (ref 5–12)
MCH RBC QN AUTO: 30.6 PG (ref 27–32.7)
MCHC RBC AUTO-ENTMCNC: 32.4 G/DL (ref 32.6–36.4)
MCV RBC AUTO: 94.4 FL (ref 79.8–96.2)
METAMYELOCYTES NFR BLD MANUAL: 2 % (ref 0–0)
MONOCYTES # BLD AUTO: 1.3 10*3/MM3 (ref 0.2–1.2)
NEUTROPHILS # BLD AUTO: 15.65 10*3/MM3 (ref 1.9–8.1)
NEUTROPHILS NFR BLD MANUAL: 84 % (ref 42.7–76)
PLAT MORPH BLD: NORMAL
PLATELET # BLD AUTO: 284 10*3/MM3 (ref 140–500)
PMV BLD AUTO: 12.8 FL (ref 6–12)
POTASSIUM BLD-SCNC: 3.6 MMOL/L (ref 3.5–5.2)
POTASSIUM BLD-SCNC: 3.9 MMOL/L (ref 3.5–5.2)
RBC # BLD AUTO: 4.12 10*6/MM3 (ref 4.6–6)
RBC MORPH BLD: NORMAL
SCAN SLIDE: NORMAL
SODIUM BLD-SCNC: 139 MMOL/L (ref 136–145)
WBC MORPH BLD: NORMAL
WBC NRBC COR # BLD: 18.63 10*3/MM3 (ref 4.5–10.7)

## 2017-07-13 PROCEDURE — 99232 SBSQ HOSP IP/OBS MODERATE 35: CPT | Performed by: INTERNAL MEDICINE

## 2017-07-13 PROCEDURE — 80048 BASIC METABOLIC PNL TOTAL CA: CPT | Performed by: HOSPITALIST

## 2017-07-13 PROCEDURE — 85007 BL SMEAR W/DIFF WBC COUNT: CPT | Performed by: HOSPITALIST

## 2017-07-13 PROCEDURE — 25010000002 ENOXAPARIN PER 10 MG: Performed by: ORTHOPAEDIC SURGERY

## 2017-07-13 PROCEDURE — 94799 UNLISTED PULMONARY SVC/PX: CPT

## 2017-07-13 PROCEDURE — 84132 ASSAY OF SERUM POTASSIUM: CPT | Performed by: HOSPITALIST

## 2017-07-13 PROCEDURE — 85025 COMPLETE CBC W/AUTO DIFF WBC: CPT | Performed by: HOSPITALIST

## 2017-07-13 PROCEDURE — 74000 HC ABDOMEN KUB: CPT

## 2017-07-13 RX ADMIN — POTASSIUM CHLORIDE 40 MEQ: 750 CAPSULE, EXTENDED RELEASE ORAL at 12:45

## 2017-07-13 RX ADMIN — SIMETHICONE CHEW TAB 80 MG 80 MG: 80 TABLET ORAL at 20:26

## 2017-07-13 RX ADMIN — VANCOMYCIN 250 MG: KIT at 06:52

## 2017-07-13 RX ADMIN — SODIUM CHLORIDE 50 ML/HR: 9 INJECTION, SOLUTION INTRAVENOUS at 22:25

## 2017-07-13 RX ADMIN — VANCOMYCIN 250 MG: KIT at 18:13

## 2017-07-13 RX ADMIN — METRONIDAZOLE 500 MG: 500 INJECTION, SOLUTION INTRAVENOUS at 09:19

## 2017-07-13 RX ADMIN — ENOXAPARIN SODIUM 40 MG: 40 INJECTION SUBCUTANEOUS at 09:19

## 2017-07-13 RX ADMIN — CARVEDILOL 12.5 MG: 12.5 TABLET, FILM COATED ORAL at 09:19

## 2017-07-13 RX ADMIN — SIMETHICONE CHEW TAB 80 MG 80 MG: 80 TABLET ORAL at 11:49

## 2017-07-13 RX ADMIN — VANCOMYCIN 250 MG: KIT at 11:49

## 2017-07-13 RX ADMIN — SIMETHICONE CHEW TAB 80 MG 80 MG: 80 TABLET ORAL at 09:19

## 2017-07-13 RX ADMIN — SIMETHICONE CHEW TAB 80 MG 80 MG: 80 TABLET ORAL at 18:13

## 2017-07-13 RX ADMIN — Medication 250 MG: at 09:19

## 2017-07-13 RX ADMIN — METRONIDAZOLE 500 MG: 500 INJECTION, SOLUTION INTRAVENOUS at 02:18

## 2017-07-13 RX ADMIN — SODIUM CHLORIDE 75 ML/HR: 9 INJECTION, SOLUTION INTRAVENOUS at 00:48

## 2017-07-13 RX ADMIN — POTASSIUM CHLORIDE 40 MEQ: 750 CAPSULE, EXTENDED RELEASE ORAL at 06:58

## 2017-07-13 RX ADMIN — VANCOMYCIN 250 MG: KIT at 00:31

## 2017-07-13 RX ADMIN — Medication 250 MG: at 18:12

## 2017-07-13 RX ADMIN — ATORVASTATIN CALCIUM 10 MG: 10 TABLET, FILM COATED ORAL at 09:19

## 2017-07-13 NOTE — PROGRESS NOTES
"DAILY PROGRESS NOTE  [facility]    Patient Identification:  Name: Demetris Nye  Age: 71 y.o.  Sex: male  :  1945  MRN: [id number]         Primary Care Physician: Rian Morfin MD    Subjective:  Interval History:No new problems today. Some diarrhea ,but not as bad.    Objective:    Scheduled Meds:    atorvastatin 10 mg Oral Daily   bisacodyl 10 mg Rectal Daily   carvedilol 12.5 mg Oral Daily   enoxaparin 40 mg Subcutaneous Daily   metroNIDAZOLE 500 mg Intravenous Q8H   polyethylene glycol 17 g Oral Daily   saccharomyces boulardii 250 mg Oral BID   sennosides-docusate sodium 2 tablet Oral BID   simethicone 80 mg Oral 4x Daily AC & at Bedtime   vancomycin 250 mg Oral Q6H     Continuous Infusions:    sodium chloride 75 mL/hr Last Rate: 75 mL/hr (17 0048)       Vital signs in last 24 hours:  Temp:  [97 °F (36.1 °C)-98.6 °F (37 °C)] 97.2 °F (36.2 °C)  Heart Rate:  [56-63] 63  Resp:  [16-20] 16  BP: ()/(62-78) 128/78    Intake/Output:    Intake/Output Summary (Last 24 hours) at 17 1023  Last data filed at 17 0715   Gross per 24 hour   Intake              240 ml   Output              950 ml   Net             -710 ml       Exam:  /78 (BP Location: Right arm, Patient Position: Sitting)  Pulse 63  Temp 97.2 °F (36.2 °C) (Oral)   Resp 16  Ht 72\" (182.9 cm)  Wt 263 lb (119 kg)  SpO2 98%  BMI 35.67 kg/m2    General Appearance:    Alert, cooperative, no distress   Head:    Normocephalic, without obvious abnormality, atraumatic   Eyes:       Throat:   Lips, tongue, gums normal   Neck:   Supple, symmetrical, trachea midline, no JVD   Lungs:     Clear to auscultation bilaterally, respirations unlabored   Chest Wall:    No tenderness or deformity    Heart:    Regular rate and rhythm, S1 and S2 normal, no murmur,no  Rub or gallop   Abdomen:     Soft, less distended,  non-tender, bowel sounds active, no masses, no organomegaly    Extremities:   Extremities " normal, atraumatic, no cyanosis or edema   Pulses:      Skin:   Skin is warm and dry,  no rashes or palpable lesions   Neurologic:   no focal deficits noted      [unfilled]  Data Review:    Results from last 7 days  Lab Units 07/13/17 0351 07/12/17 2006 07/12/17 0430 07/11/17 0419   SODIUM mmol/L 139  --  140 137   POTASSIUM mmol/L 3.6 4.1 3.2* 3.6   CHLORIDE mmol/L 105  --  106 103   CO2 mmol/L 21.0*  --  21.1* 19.9*   BUN mg/dL 15  --  32* 55*   CREATININE mg/dL 0.88  --  0.98 1.18   GLUCOSE mg/dL 106*  --  114* 108*   CALCIUM mg/dL 7.8*  --  8.0* 8.1*       Results from last 7 days  Lab Units 07/13/17 0351 07/12/17 0430 07/11/17 0419   WBC 10*3/mm3 18.63* 22.32* 24.81*   HEMOGLOBIN g/dL 12.6* 13.0* 12.8*   HEMATOCRIT % 38.9* 40.2* 37.6*   PLATELETS 10*3/mm3 284 271 255             No results found for: TROPONINT            Invalid input(s): PROT, LABALBU          No results found for: POCGLU        Patient Active Problem List   Diagnosis Code   • Fall W19.XXXA   • Closed right hip fracture S72.001A   • HTN (hypertension) I10   • PVC (premature ventricular contraction) I49.3   • RUDY N17.9   • Hyponatremia E87.1   • Ileus, postoperative K91.3   • Colitis K52.9   • C. difficile colitis A04.7       Assessment:  Principal Problem:    Closed right hip fracture  Active Problems:    Fall    HTN (hypertension)    PVC (premature ventricular contraction)    RUDY    Hyponatremia    Ileus, postoperative    Colitis    C. difficile colitis      Plan:  GI consult noted. Continue antibiotics C Dif, await return of bowel function. Replace K +. DC flagyl. Repeat ANDREA Balbuena MD  7/13/2017  10:23 AM

## 2017-07-13 NOTE — PROGRESS NOTES
[facility]  Inpatient Progress Note    Reason for Follow Up:  Colitis    Subjective     Interval History:   Continues to feel overall better .  Decreased diarrhea  Tolerating oral intake.      Current Facility-Administered Medications:   •  acetaminophen (TYLENOL) tablet 650 mg, 650 mg, Oral, Q4H PRN, Manuelito Alba MD, 650 mg at 07/12/17 1557  •  atorvastatin (LIPITOR) tablet 10 mg, 10 mg, Oral, Daily, Jaime Willoughby MD, 10 mg at 07/13/17 0919  •  bisacodyl (DULCOLAX) suppository 10 mg, 10 mg, Rectal, Daily, Demetris Mccoy MD, 10 mg at 07/09/17 1725  •  carvedilol (COREG) tablet 12.5 mg, 12.5 mg, Oral, Daily, Demetris Mccoy MD, 12.5 mg at 07/13/17 0919  •  diphenhydrAMINE (BENADRYL) capsule 25 mg, 25 mg, Oral, Q6H PRN, Manuelito Alba MD, 25 mg at 07/03/17 1309  •  enoxaparin (LOVENOX) syringe 40 mg, 40 mg, Subcutaneous, Daily, Manuelito Alba MD, 40 mg at 07/13/17 0919  •  melatonin tablet 1 mg, 1 mg, Oral, Nightly PRN, Manuelito Alba MD  •  ondansetron (ZOFRAN) tablet 4 mg, 4 mg, Oral, Q6H PRN **OR** ondansetron ODT (ZOFRAN-ODT) disintegrating tablet 4 mg, 4 mg, Oral, Q6H PRN, 4 mg at 07/07/17 1206 **OR** ondansetron (ZOFRAN) injection 4 mg, 4 mg, Intravenous, Q6H PRN, Manuelito Alba MD  •  pneumococcal polysaccharide 23-valent (PNEUMOVAX-23) vaccine 0.5 mL, 0.5 mL, Intramuscular, During Hospitalization, Jaime Willoughby MD  •  polyethylene glycol (MIRALAX) powder 17 g, 17 g, Oral, Daily, Demetris Mccoy MD, 17 g at 07/09/17 0917  •  potassium chloride (MICRO-K) CR capsule 40 mEq, 40 mEq, Oral, PRN, 40 mEq at 07/13/17 0658 **OR** potassium chloride (KLOR-CON) packet 40 mEq, 40 mEq, Oral, PRN **OR** potassium chloride 10 mEq in 100 mL IVPB, 10 mEq, Intravenous, Q1H PRN, Joao Balbuena MD  •  saccharomyces boulardii (FLORASTOR) capsule 250 mg, 250 mg, Oral, BID, Demetris Mccoy MD, 250 mg at 07/13/17 0919  •  sennosides-docusate sodium (SENOKOT-S) 8.6-50 MG  tablet 2 tablet, 2 tablet, Oral, BID, Manuelito Alba MD, 2 tablet at 07/09/17 1725  •  simethicone (MYLICON) chewable tablet 80 mg, 80 mg, Oral, 4x Daily AC & at Bedtime, Demetris Mccoy MD, 80 mg at 07/13/17 1149  •  sodium chloride 0.9 % flush 1-10 mL, 1-10 mL, Intravenous, PRN, Manuelito Alba MD  •  sodium chloride 0.9 % infusion, 50 mL/hr, Intravenous, Continuous, Joao Balbuena MD, Last Rate: 50 mL/hr at 07/13/17 1152, 50 mL/hr at 07/13/17 1152  •  vancomycin oral solution 250 mg, 250 mg, Oral, Q6H, Joao Balbuena MD, 250 mg at 07/13/17 1149     Review of Systems:    All systems were reviewed and negative except for:  Gastrointestinal: postitive for  bloating / distention, diarrhea and pain    Objective     Vital Signs  Temp:  [97 °F (36.1 °C)-97.5 °F (36.4 °C)] 97.2 °F (36.2 °C)  Heart Rate:  [56-63] 63  Resp:  [16-20] 16  BP: (119-142)/(70-78) 128/78  Body mass index is 35.67 kg/(m^2).    Intake/Output Summary (Last 24 hours) at 07/13/17 1204  Last data filed at 07/13/17 0715   Gross per 24 hour   Intake              240 ml   Output              950 ml   Net             -710 ml     I/O this shift:  In: 240 [P.O.:240]  Out: -      Physical Exam:   General: patient awake, alert and cooperative, non-toxic   Eyes: Normal lids and lashes, no scleral icterus   Neck: supple, normal ROM   Skin: warm and dry, not jaundiced   Cardiovascular: regular rhythm and rate, no murmurs auscultated   Pulm: clear to auscultation bilaterally, regular and unlabored   Abdomen: mild distention but no significant TTP,  normal bowel sounds, non surgical abdomen   Rectal: deferred   Extremities: no rash or edema   Psychiatric: Normal mood and behavior; memory intact     Results Review:     I reviewed the patient's new clinical results.      Results from last 7 days  Lab Units 07/13/17  0351 07/12/17  0430 07/11/17  0419   WBC 10*3/mm3 18.63* 22.32* 24.81*   HEMOGLOBIN g/dL 12.6* 13.0* 12.8*   HEMATOCRIT % 38.9* 40.2* 37.6*    PLATELETS 10*3/mm3 284 271 255       Results from last 7 days  Lab Units 07/13/17  0351 07/12/17 2006 07/12/17  0430 07/11/17  0419   SODIUM mmol/L 139  --  140 137   POTASSIUM mmol/L 3.6 4.1 3.2* 3.6   CHLORIDE mmol/L 105  --  106 103   CO2 mmol/L 21.0*  --  21.1* 19.9*   BUN mg/dL 15  --  32* 55*   CREATININE mg/dL 0.88  --  0.98 1.18   CALCIUM mg/dL 7.8*  --  8.0* 8.1*   GLUCOSE mg/dL 106*  --  114* 108*           Amelia Schaffer APRN   7/13/17      We are following for colitis       Constitutional: He is oriented to person, place, and time. He appears well-developed and well-nourished.   HENT: anicteric and no thyromegaly  Head: Normocephalic and atraumatic.   Eyes: Conjunctivae and EOM are normal.   Neck: Normal range of motion. No tracheal deviation present.   Cardiovascular: Normal rate and regular rhythm.    Pulmonary/Chest: Effort normal and breath sounds normal. No respiratory distress.   Abdominal: Soft. Bowel sounds are normal. He exhibits no distension and no mass. There is no tenderness. There is no rebound and no guarding.   Musculoskeletal: Normal range of motion.   Neurological: He is alert and oriented to person, place, and time.   Skin: Skin is warm and dry.   Psychiatric: He has a normal mood and affect. Judgment normal.   Nursing note and vitals reviewed.    Assessment   1. C diff colitis  2. Leukocytosis - secondary to above - slowly improving. Remains afebrile  3. Abnormal KUB - ? Post-op/infectious ileus. He is having regular stool and abdominal exam in benign    Plan  continue oral vancomycin  -await repeat KUB  -monitor WBC  -will continue to follow closely

## 2017-07-13 NOTE — SIGNIFICANT NOTE
07/13/17 1629   Rehab Treatment   Discipline physical therapist   Treatment Not Performed patient/family declined treatment  (Checked on pt again this pm, states he is still tired and feels it would be best to wait till tomorrow )   Recommendation   PT - Next Appointment 07/14/17

## 2017-07-13 NOTE — SIGNIFICANT NOTE
07/13/17 1519   Rehab Treatment   Discipline physical therapist   Treatment Not Performed patient/family declined treatment  (Pt reports increased fatigue from busy day requesting to rest.  Pt aware PT may return later this afternoon or tomorrow depending on rest of afternoon.  Pt still requesting to rest now.  )   Recommendation   PT - Next Appointment 07/13/17

## 2017-07-13 NOTE — PLAN OF CARE
Problem: Patient Care Overview (Adult)  Goal: Plan of Care Review  Outcome: Ongoing (interventions implemented as appropriate)    07/13/17 1326   Coping/Psychosocial Response Interventions   Plan Of Care Reviewed With patient   Patient Care Overview   Progress no change   Outcome Evaluation   Outcome Summary/Follow up Plan Patient presents as unwilling to participate in ambulation or wotrking with therapy. HTN controlled with po meds, eductted on importance of monitoring BP       Goal: Discharge Needs Assessment  Outcome: Ongoing (interventions implemented as appropriate)    07/13/17 1326   Discharge Needs Assessment   Concerns To Be Addressed no discharge needs identified         Problem: Orthopaedic Fracture (Adult)  Goal: Signs and Symptoms of Listed Potential Problems Will be Absent or Manageable (Orthopaedic Fracture)  Outcome: Ongoing (interventions implemented as appropriate)    07/13/17 1326   Orthopaedic Fracture   Problems Assessed (Orthopaedic Fracture) all   Problems Present (Orthopaedic Fracture) functional deficit/ self-care deficit         07/13/17 1326   Orthopaedic Fracture   Problems Assessed (Orthopaedic Fracture) all   Problems Present (Orthopaedic Fracture) functional deficit/ self-care deficit;gastrointestinal complications         Problem: Fall Risk (Adult)  Goal: Absence of Falls  Outcome: Ongoing (interventions implemented as appropriate)    07/13/17 1326   Fall Risk (Adult)   Absence of Falls achieves outcome         Problem: Infection, Risk/Actual (Adult)  Goal: Infection Prevention/Resolution  Outcome: Ongoing (interventions implemented as appropriate)    07/13/17 1326   Infection, Risk/Actual (Adult)   Infection Prevention/Resolution making progress toward outcome

## 2017-07-13 NOTE — PLAN OF CARE
Problem: Patient Care Overview (Adult)  Goal: Adult Individualization and Mutuality  Outcome: Ongoing (interventions implemented as appropriate)    07/13/17 0421   Individualization   Patient Specific Goals promote pain control, increase mobility, promote normal GI junction   Patient Specific Interventions offer pain medication as needed, assist with ambulation, monitor stools and labs, educated on self management of hypertension and monitoring blood pressure at home.       Goal: Discharge Needs Assessment  Outcome: Ongoing (interventions implemented as appropriate)    Problem: Orthopaedic Fracture (Adult)  Goal: Signs and Symptoms of Listed Potential Problems Will be Absent or Manageable (Orthopaedic Fracture)  Outcome: Ongoing (interventions implemented as appropriate)    Problem: Fall Risk (Adult)  Goal: Absence of Falls  Outcome: Ongoing (interventions implemented as appropriate)    Problem: Infection, Risk/Actual (Adult)  Goal: Infection Prevention/Resolution  Outcome: Ongoing (interventions implemented as appropriate)

## 2017-07-14 VITALS
RESPIRATION RATE: 16 BRPM | OXYGEN SATURATION: 98 % | HEART RATE: 52 BPM | HEIGHT: 72 IN | WEIGHT: 263 LBS | DIASTOLIC BLOOD PRESSURE: 63 MMHG | SYSTOLIC BLOOD PRESSURE: 105 MMHG | BODY MASS INDEX: 35.62 KG/M2 | TEMPERATURE: 96.9 F

## 2017-07-14 LAB
ANION GAP SERPL CALCULATED.3IONS-SCNC: 11.5 MMOL/L
BUN BLD-MCNC: 9 MG/DL (ref 8–23)
BUN/CREAT SERPL: 12.7 (ref 7–25)
CALCIUM SPEC-SCNC: 8.2 MG/DL (ref 8.6–10.5)
CHLORIDE SERPL-SCNC: 112 MMOL/L (ref 98–107)
CO2 SERPL-SCNC: 19.5 MMOL/L (ref 22–29)
CREAT BLD-MCNC: 0.71 MG/DL (ref 0.76–1.27)
DEPRECATED RDW RBC AUTO: 48.9 FL (ref 37–54)
EOSINOPHIL # BLD MANUAL: 0.38 10*3/MM3 (ref 0–0.7)
EOSINOPHIL NFR BLD MANUAL: 2 % (ref 0.3–6.2)
ERYTHROCYTE [DISTWIDTH] IN BLOOD BY AUTOMATED COUNT: 14 % (ref 11.5–14.5)
GFR SERPL CREATININE-BSD FRML MDRD: 110 ML/MIN/1.73
GLUCOSE BLD-MCNC: 101 MG/DL (ref 65–99)
HCT VFR BLD AUTO: 37.9 % (ref 40.4–52.2)
HGB BLD-MCNC: 12.6 G/DL (ref 13.7–17.6)
LARGE PLATELETS: ABNORMAL
LYMPHOCYTES # BLD MANUAL: 2.11 10*3/MM3 (ref 0.9–4.8)
LYMPHOCYTES NFR BLD MANUAL: 11 % (ref 19.6–45.3)
LYMPHOCYTES NFR BLD MANUAL: 9 % (ref 5–12)
MCH RBC QN AUTO: 31.8 PG (ref 27–32.7)
MCHC RBC AUTO-ENTMCNC: 33.2 G/DL (ref 32.6–36.4)
MCV RBC AUTO: 95.7 FL (ref 79.8–96.2)
METAMYELOCYTES NFR BLD MANUAL: 1 % (ref 0–0)
MONOCYTES # BLD AUTO: 1.72 10*3/MM3 (ref 0.2–1.2)
MYELOCYTES NFR BLD MANUAL: 1 % (ref 0–0)
NEUTROPHILS # BLD AUTO: 14.55 10*3/MM3 (ref 1.9–8.1)
NEUTROPHILS NFR BLD MANUAL: 76 % (ref 42.7–76)
PLATELET # BLD AUTO: 279 10*3/MM3 (ref 140–500)
PMV BLD AUTO: 12.5 FL (ref 6–12)
POTASSIUM BLD-SCNC: 3.7 MMOL/L (ref 3.5–5.2)
RBC # BLD AUTO: 3.96 10*6/MM3 (ref 4.6–6)
RBC MORPH BLD: NORMAL
SCAN SLIDE: NORMAL
SMUDGE CELLS BLD QL SMEAR: ABNORMAL
SODIUM BLD-SCNC: 143 MMOL/L (ref 136–145)
WBC NRBC COR # BLD: 19.15 10*3/MM3 (ref 4.5–10.7)

## 2017-07-14 PROCEDURE — 90732 PPSV23 VACC 2 YRS+ SUBQ/IM: CPT | Performed by: INTERNAL MEDICINE

## 2017-07-14 PROCEDURE — 85007 BL SMEAR W/DIFF WBC COUNT: CPT | Performed by: HOSPITALIST

## 2017-07-14 PROCEDURE — 25010000002 PNEUMOCOCCAL VAC POLYVALENT PER 0.5 ML: Performed by: INTERNAL MEDICINE

## 2017-07-14 PROCEDURE — G0009 ADMIN PNEUMOCOCCAL VACCINE: HCPCS | Performed by: INTERNAL MEDICINE

## 2017-07-14 PROCEDURE — 94799 UNLISTED PULMONARY SVC/PX: CPT

## 2017-07-14 PROCEDURE — 80048 BASIC METABOLIC PNL TOTAL CA: CPT | Performed by: HOSPITALIST

## 2017-07-14 PROCEDURE — 25010000002 ENOXAPARIN PER 10 MG: Performed by: ORTHOPAEDIC SURGERY

## 2017-07-14 PROCEDURE — 85025 COMPLETE CBC W/AUTO DIFF WBC: CPT | Performed by: HOSPITALIST

## 2017-07-14 RX ADMIN — ENOXAPARIN SODIUM 40 MG: 40 INJECTION SUBCUTANEOUS at 08:57

## 2017-07-14 RX ADMIN — SIMETHICONE CHEW TAB 80 MG 80 MG: 80 TABLET ORAL at 07:48

## 2017-07-14 RX ADMIN — ATORVASTATIN CALCIUM 10 MG: 10 TABLET, FILM COATED ORAL at 08:53

## 2017-07-14 RX ADMIN — VANCOMYCIN 250 MG: KIT at 11:36

## 2017-07-14 RX ADMIN — VANCOMYCIN 250 MG: KIT at 07:48

## 2017-07-14 RX ADMIN — Medication 250 MG: at 08:54

## 2017-07-14 RX ADMIN — PNEUMOCOCCAL VACCINE POLYVALENT 0.5 ML
25; 25; 25; 25; 25; 25; 25; 25; 25; 25; 25; 25; 25; 25; 25; 25; 25; 25; 25; 25; 25; 25; 25 INJECTION, SOLUTION INTRAMUSCULAR; SUBCUTANEOUS at 16:13

## 2017-07-14 RX ADMIN — CARVEDILOL 12.5 MG: 12.5 TABLET, FILM COATED ORAL at 08:54

## 2017-07-14 RX ADMIN — VANCOMYCIN 250 MG: KIT at 01:23

## 2017-07-14 RX ADMIN — SIMETHICONE CHEW TAB 80 MG 80 MG: 80 TABLET ORAL at 11:36

## 2017-07-14 NOTE — PLAN OF CARE
Problem: Patient Care Overview (Adult)  Goal: Adult Individualization and Mutuality  Outcome: Ongoing (interventions implemented as appropriate)    07/14/17 4455   Individualization   Patient Specific Goals promote adequate pain control, increase and maintain mobility, promote normal bowel function   Patient Specific Interventions offer tylent for pain if needed,, monitor stools, assist with ambulation, educate on self management of hypertension and monitoring blood pressure at home.         Problem: Orthopaedic Fracture (Adult)  Goal: Signs and Symptoms of Listed Potential Problems Will be Absent or Manageable (Orthopaedic Fracture)  Outcome: Ongoing (interventions implemented as appropriate)    Problem: Fall Risk (Adult)  Goal: Absence of Falls  Outcome: Ongoing (interventions implemented as appropriate)    Problem: Infection, Risk/Actual (Adult)  Goal: Infection Prevention/Resolution  Outcome: Ongoing (interventions implemented as appropriate)

## 2017-07-14 NOTE — DISCHARGE SUMMARY
PHYSICIAN DISCHARGE SUMMARY                                                                        [facility]    Patient Identification:  Name: Demetris Nye  Age: 71 y.o.  Sex: male  :  1945  MRN: [id number]  Primary Care Physician: Rian Morfin MD    Admit date: 2017  Discharge date and time:2017  Discharged Condition: fair    Discharge Diagnoses:Principal Problem:    Closed right hip fracture  Active Problems:    Fall    HTN (hypertension)    PVC (premature ventricular contraction)    RUDY    Hyponatremia    Ileus, postoperative    Colitis    C. difficile colitis     Patient Active Problem List   Diagnosis Code   • Fall W19.XXXA   • Closed right hip fracture S72.001A   • HTN (hypertension) I10   • PVC (premature ventricular contraction) I49.3   • RUDY N17.9   • Hyponatremia E87.1   • Ileus, postoperative K91.3   • Colitis K52.9   • C. difficile colitis A04.7       PMHX:   Past Medical History:   Diagnosis Date   • Arthritis    • Hyperlipidemia    • Hypertension    • Irregular cardiac rhythm      PSHX:   Past Surgical History:   Procedure Laterality Date   • [place] OPEN FIX INTER/SUBTROCH FX,IMPLNT Right 2017    Procedure: RIGHT INTRAMEDULLARY NAILING/RODDING;  Surgeon: Manuelito Alba MD;  Location: [facility];  Service: Orthopedics       Hospital Course: Demetris Nye  is 71-year-old male with past medical history is remarkable for blood pressure dyslipidemia was in his usual state of his health until this afternoon when he fell at that appeared place the work.  He instantly have developed significant pain and discomfort in the right hip and could not get up.  He was brought to the emergency room and was found to have right hip intertrochanteric fracture and is being admitted for further care. Prior to fall he was feeling fine and had no other symptoms.         The patient was admitted to hospital  for hip fracture and had open reduction internal fixation with nailing and rodding.  Postoperatively the patient developed some degree of ileus and had diarrhea and was positive for C. difficile.  He was treated with oral vancomycin and was feeling better after few days and looked well enough to go home with home health.  He'll finish another 10 days of oral vancomycin for C. difficile and follow-up with orthopedic surgery and follow-up with his primary care doctor as outpatient.  He did have some postop acute kidney injury and low blood pressure and several of his blood pressure medicines are on hold at the present time may need to be restarted at later date if his blood pressure comes up.    Consults:     Consults     Date and Time Order Name Status Description    7/9/2017 1511 Inpatient Consult to Gastroenterology Completed     6/30/2017 1825 [facility] (on-call MD unless specified) Completed     6/30/2017 1756 Ortho (on-call MD unless specified) Completed           Results from last 7 days  Lab Units 07/14/17  0340   WBC 10*3/mm3 19.15*   HEMOGLOBIN g/dL 12.6*   HEMATOCRIT % 37.9*   PLATELETS 10*3/mm3 279       Results from last 7 days  Lab Units 07/14/17  0340   SODIUM mmol/L 143   POTASSIUM mmol/L 3.7   CHLORIDE mmol/L 112*   CO2 mmol/L 19.5*   BUN mg/dL 9   CREATININE mg/dL 0.71*   GLUCOSE mg/dL 101*   CALCIUM mg/dL 8.2*     Significant Diagnostic Studies:   Lab Results   Component Value Date    WBC 19.15 (H) 07/14/2017    HGB 12.6 (L) 07/14/2017    HCT 37.9 (L) 07/14/2017     07/14/2017     Lab Results   Component Value Date     07/14/2017    K 3.7 07/14/2017     (H) 07/14/2017    CO2 19.5 (L) 07/14/2017    BUN 9 07/14/2017    CREATININE 0.71 (L) 07/14/2017    GLUCOSE 101 (H) 07/14/2017     Lab Results   Component Value Date    CALCIUM 8.2 (L) 07/14/2017     No results found for: AST, ALT, ALKPHOS  No results found for: APTT, INR  No results found for: COLORU, CLARITYU, SPECGRAV, PHUR, PROTEINUR,  GLUCOSEU, KETONESU, BLOODU, NITRITE, LEUKOCYTESUR, BILIRUBINUR, UROBILINOGEN, RBCUA, WBCUA, BACTERIA  No results found for: TROPONINT, TROPONINI, BNP  No components found for: HGBA1C;2  No components found for: TSH;2  Imaging Results (all)     Procedure Component Value Units Date/Time    XR Chest 1 View [[id number]] Collected:  06/30/17 1729     Updated:  06/30/17 1814    Narrative:       1-VIEW SUPINE CHEST X-RAY     HISTORY: Fell. Chest pain.     FINDINGS:  The lungs are well-expanded and clear, and the heart size is  normal. There is no acute disease.     This report was finalized on 6/30/2017 6:11 PM by Dr. Trell Salazar MD.       XR Hip With or Without Pelvis 2 - 3 View Right [[id number]] Collected:  06/30/17 1729     Updated:  06/30/17 1815    Narrative:       2-VIEW RIGHT HIP AND 1-VIEW AP PELVIS     HISTORY: Fell. Right hip pain.     FINDINGS:  There is an acute intertrochanteric fracture involving the  right proximal femur without significant angulation deformity.     This report was finalized on 6/30/2017 6:11 PM by Dr. Trell Salazar MD.       FL C Arm During Surgery [[id number]] Resulted:  07/01/17 0835     Updated:  07/01/17 0835    Narrative:       This procedure was auto-finalized with no dictation required.    XR Hip 1 View Without Pelvis Left (Surgery Only) [[id number]] Collected:  07/01/17 0944     Updated:  07/01/17 0958    Narrative:       ONE VIEW PORTABLE RIGHT HIP     HISTORY: Internal fixation of fracture.     FINDINGS: The postoperative image shows internal fixation of the  intertrochanteric fracture and the alignment appears satisfactory.     This report was finalized on 7/1/2017 9:55 AM by Dr. Trell Salazar MD.       XR Hip With or Without Pelvis 2 - 3 View Right [[id number]] Collected:  07/01/17 0931     Updated:  07/01/17 0958    Narrative:       TWO VIEW PORTABLE RIGHT HIP IN OR     HISTORY: Internal fixation of hip fracture.     FINDINGS: Imaging in the operating room was performed at the  time of  internal fixation of an intertrochanteric fracture with intramedullary  sanjana and intersecting screw and the alignment appears satisfactory. Five  images were obtained and the fluoroscopy time measures 38 seconds.     This report was finalized on 7/1/2017 9:55 AM by Dr. Trell Salazar MD.       XR Foot 3+ View Right [[id number]] Collected:  07/06/17 1237     Updated:  07/06/17 1727    Narrative:       RIGHT FOOT X-RAY     HISTORY: Lateral foot pain around the 5th metatarsal after a fall 6 days  ago.     TECHNIQUE: Right foot x-rays consisting of 3 images are provided. There  is no previous exam for comparison.     FINDINGS: The mid foot is normally aligned. There is advanced  osteoarthritis at the 1st metatarsophalangeal joint with complete loss  of radiographic joint space and bulky marginal osteophyte. The joints  elsewhere in the foot are preserved. No fracture is identified in the  5th metatarsal or elsewhere.       Impression:       No acute or subacute abnormality is identified. There is  advanced chronic osteoarthritic change at the 1st metatarsophalangeal  joint.     This report was finalized on 7/6/2017 5:24 PM by Dr. Neal Lowe MD.       XR Abdomen 2 View With Chest 1 View [[id number]] Collected:  07/07/17 1637     Updated:  07/07/17 1706    Narrative:       CHEST X-RAY AND ABDOMEN SERIES     HISTORY: Abdominal pain and distention.     TECHNIQUE: Single view of the chest and 5 views of the abdomen and  pelvis are provided.     FINDINGS: The cardiomediastinal silhouette is normal. The lungs are  clear. There is no pneumothorax.     Gas distends the colon throughout its length. The transverse colon is  dilated to about 12 cm. Gas is observed to the level of the distal  sigmoid. No abnormally dilated small bowel is present. Stomach is small  in caliber. No free air is identified. No volvulus configuration of the  colon gas pattern is present.       Impression:       1. Negative chest x-ray.  2.  Gaseous dilatation of the colon. Gas is seen to the level of the  rectum and there is no abnormally dilated small bowel or stomach. This  may represent ileus but would be somewhat unusual configuration for  obstruction.     This report was finalized on 7/7/2017 5:03 PM by Dr. Neal Lowe MD.       XR Abdomen KUB [[id number]] Collected:  07/09/17 0957     Updated:  07/09/17 1006    Narrative:       XR ABDOMEN KUB-     INDICATIONS: Phalanx     TECHNIQUE: SUPINE VIEWS OF THE ABDOMEN     COMPARISON: 07/07/2017     FINDINGS:      The bowel gas pattern remains abnormal, with persistent abnormal  gaseous distention of the colon. Appearance is similar to the prior  exam. Free intraperitoneal gas cannot be excluded on a supine  radiograph. Continued follow-up is suggested as indications persist. If  there is further clinical concern, CT can be obtained for further  examination.       Impression:          As described.     This report was finalized on 7/9/2017 10:03 AM by Dr. Reggie Morgan MD.       CT Abdomen Pelvis Without Contrast [[id number]] Collected:  07/09/17 1501     Updated:  07/09/17 1516    Narrative:       CT OF THE ABDOMEN AND PELVIS WITH CONTRAST     HISTORY: 71-year-old male with a history of abdominal distention, pain  and hypoactive bowel sounds.     TECHNIQUE: Contiguous axial images were obtained through the abdomen and  pelvis without IV or oral contrast.     COMPARISON: None.     FINDINGS: The visualized portions of the lung bases are clear. The  visualized portion of the heart has a normal appearance. Calcified  granulomata are seen within the spleen. There is a 3.9 cm left renal  cyst. There is a 1.4 cm right renal cyst. The gallbladder, liver,  pancreas and adrenal glands have a normal noncontrasted appearance.     There is marked distention of the transverse colon with considerable  thickening of the wall of the proximal transverse colon to 9 mm.  Air-fluid levels are seen within the  transverse colon. There is  stranding within the right hemiabdomen. A scant amount of free fluid is  seen within the right paracolic gutter. A small amount of free fluid is  seen within the pelvic cul-de-sac. There are no abnormally dilated loops  of small bowel.       Impression:       Imaging features of the colon that are most consistent with  the presence of colitis. The majority of the colon is involved but it is  primarily most considerable within the ascending and transverse colon.     This report was finalized on 7/9/2017 3:13 PM by Dr. Miguel Angel Mcmullen MD.       XR Abdomen KUB [[id number]] Collected:  07/11/17 1330     Updated:  07/11/17 1335    Narrative:       SUPINE ABDOMEN     HISTORY: 71-year-old male postop ileus     COMPARISON: CT of 07/09/2017     FINDINGS:  1. Persistent somewhat prominent air-filled colon measuring up to 12 cm.  2. No evidence of obstruction nor free air.  3. Thumbprinting right ascending colon suggesting focal inflammatory  change.     This report was finalized on 7/11/2017 1:32 PM by Dr. Russell Lock MD.       XR Abdomen KUB [[id number]] Collected:  07/13/17 1418     Updated:  07/13/17 1535    Narrative:       ONE VIEW ABDOMEN     HISTORY: Followup of ileus. Abdominal distention.     There is less gaseous distention of the colon when compared to the study  of 2 days ago. At that time, the right portion of the transverse colon  measured up to 12.1 cm and now measures 6.8 cm. No new abnormality is  seen.     This report was finalized on 7/13/2017 3:32 PM by Dr. Trell Salazar MD.           Lab Results (last 7 days)     Procedure Component Value Units Date/Time    Phosphorus [[id number]]  (Normal) Collected:  07/09/17 0449    Specimen:  Blood Updated:  07/09/17 0557     Phosphorus 4.2 mg/dL     Basic Metabolic Panel [[id number]]  (Abnormal) Collected:  07/09/17 0449    Specimen:  Blood Updated:  07/09/17 0558     Glucose 119 (H) mg/dL      BUN 81 (H) mg/dL      Creatinine 1.86 (H)  mg/dL      Sodium 131 (L) mmol/L      Potassium 3.8 mmol/L      Chloride 93 (L) mmol/L      CO2 19.9 (L) mmol/L      Calcium 8.6 mg/dL      eGFR Non African Amer 36 (L) mL/min/1.73      BUN/Creatinine Ratio 43.5 (H)     Anion Gap 18.1 mmol/L     Narrative:       GFR Normal >60  Chronic Kidney Disease <60  Kidney Failure <15    Magnesium [[id number]]  (Abnormal) Collected:  07/09/17 0449    Specimen:  Blood Updated:  07/09/17 0558     Magnesium 3.0 (H) mg/dL     Urinalysis With / Culture If Indicated [[id number]]  (Abnormal) Collected:  07/10/17 0245    Specimen:  Urine from Urine, Clean Catch Updated:  07/10/17 0255     Color, UA Dark Yellow (A)     Appearance, UA Clear     pH, UA <=5.0     Specific Gravity, UA 1.022     Glucose, UA Negative     Ketones, UA Negative     Bilirubin, UA Negative     Blood, UA Negative     Protein, UA Trace (A)     Leuk Esterase, UA Negative     Nitrite, UA Negative     Urobilinogen, UA 2.0 E.U./dL (A)    Narrative:       Urine microscopic not indicated.    Creatinine, Urine, Random [[id number]] Collected:  07/10/17 0245    Specimen:  Urine from Urine, Clean Catch Updated:  07/10/17 0312     Creatinine, Urine 95.8 mg/dL     Narrative:       Reference intervals for random urine have not been established.  Clinical usage is dependent upon physician's interpretation in combination with other laboratory tests.     Sodium, Urine, Random [[id number]] Collected:  07/10/17 0245    Specimen:  Urine from Urine, Clean Catch Updated:  07/10/17 0321     Sodium, Urine <20 mmol/L     Narrative:       Reference intervals for random urine have not been established.  Clinical usage is dependent upon physician's interpretation in combination with other laboratory tests.     Eosinophil Smear [[id number]]  (Normal) Collected:  07/10/17 0241    Specimen:  Urine from Urine, Clean Catch Updated:  07/10/17 0405     Eosinophil Smear 0 % EOS/100 Cells     Basic Metabolic Panel [[id number]]  (Abnormal) Collected:   07/10/17 0411    Specimen:  Blood Updated:  07/10/17 0452     Glucose 133 (H) mg/dL      BUN 87 (H) mg/dL      Creatinine 1.58 (H) mg/dL      Sodium 138 mmol/L      Potassium 4.1 mmol/L      Chloride 100 mmol/L      CO2 23.2 mmol/L      Calcium 8.6 mg/dL      eGFR Non African Amer 44 (L) mL/min/1.73      BUN/Creatinine Ratio 55.1 (H)     Anion Gap 14.8 mmol/L     Narrative:       GFR Normal >60  Chronic Kidney Disease <60  Kidney Failure <15    CBC & Differential [[id number]] Collected:  07/10/17 0411    Specimen:  Blood Updated:  07/10/17 0502    Narrative:       The following orders were created for panel order CBC & Differential.  Procedure                               Abnormality         Status                     ---------                               -----------         ------                     Scan Slide[[id number]]                                                                  CBC Auto Differential[[id number]]        Abnormal            Final result                 Please view results for these tests on the individual orders.    CBC Auto Differential [[id number]]  (Abnormal) Collected:  07/10/17 0411    Specimen:  Blood Updated:  07/10/17 0502     WBC 28.33 (H) 10*3/mm3      RBC 4.11 (L) 10*6/mm3      Hemoglobin 12.7 (L) g/dL      Hematocrit 37.2 (L) %      MCV 90.5 fL      MCH 30.9 pg      MCHC 34.1 g/dL      RDW 13.7 %      RDW-SD 45.2 fl      MPV 13.6 (H) fL      Platelets 231 10*3/mm3      Neutrophil % 84.2 (H) %      Lymphocyte % 4.3 (L) %      Monocyte % 9.4 %      Eosinophil % 0.5 %      Basophil % 0.2 %      Immature Grans % 1.4 (H) %      Neutrophils, Absolute 23.85 (H) 10*3/mm3      Lymphocytes, Absolute 1.22 10*3/mm3      Monocytes, Absolute 2.66 (H) 10*3/mm3      Eosinophils, Absolute 0.15 10*3/mm3      Basophils, Absolute 0.06 10*3/mm3      Immature Grans, Absolute 0.39 (H) 10*3/mm3      nRBC 0.0 /100 WBC     Clostridium Difficile Toxin, PCR [[id number]]  (Abnormal) Collected:  07/10/17 114     Specimen:  Stool from Per Rectum Updated:  07/10/17 1424     C. Difficile Toxins by PCR Positive (C)    Lactic Acid, Plasma [[id number]]  (Normal) Collected:  07/11/17 0419    Specimen:  Blood Updated:  07/11/17 0453     Lactate 0.9 mmol/L     Basic Metabolic Panel [[id number]]  (Abnormal) Collected:  07/11/17 0419    Specimen:  Blood Updated:  07/11/17 0536     Glucose 108 (H) mg/dL      BUN 55 (H) mg/dL      Creatinine 1.18 mg/dL      Sodium 137 mmol/L      Potassium 3.6 mmol/L      Chloride 103 mmol/L      CO2 19.9 (L) mmol/L      Calcium 8.1 (L) mg/dL      eGFR Non African Amer 61 mL/min/1.73      BUN/Creatinine Ratio 46.6 (H)     Anion Gap 14.1 mmol/L     Narrative:       GFR Normal >60  Chronic Kidney Disease <60  Kidney Failure <15    CBC & Differential [[id number]] Collected:  07/11/17 0419    Specimen:  Blood Updated:  07/11/17 0554    Narrative:       The following orders were created for panel order CBC & Differential.  Procedure                               Abnormality         Status                     ---------                               -----------         ------                     Manual Differential[[id number]]          Abnormal            Final result               Scan Slide[[id number]]                                       Final result               CBC Auto Differential[[id number]]        Abnormal            Final result                 Please view results for these tests on the individual orders.    CBC Auto Differential [[id number]]  (Abnormal) Collected:  07/11/17 0419    Specimen:  Blood Updated:  07/11/17 0554     WBC 24.81 (H) 10*3/mm3      RBC 4.11 (L) 10*6/mm3      Hemoglobin 12.8 (L) g/dL      Hematocrit 37.6 (L) %      MCV 91.5 fL      MCH 31.1 pg      MCHC 34.0 g/dL      RDW 13.8 %      RDW-SD 46.7 fl      MPV 13.3 (H) fL      Platelets 255 10*3/mm3     Scan Slide [[id number]] Collected:  07/11/17 0419    Specimen:  Blood Updated:  07/11/17 0554     Scan Slide --      See Manual  Differential Results       Manual Differential [[id number]]  (Abnormal) Collected:  07/11/17 0419    Specimen:  Blood Updated:  07/11/17 0554     Neutrophil % 80.0 (H) %      Lymphocyte % 5.0 (L) %      Monocyte % 10.0 %      Eosinophil % 2.0 %      Metamyelocyte % 1.0 (H) %      Myelocyte % 2.0 (H) %      Neutrophils Absolute 19.85 (H) 10*3/mm3      Lymphocytes Absolute 1.24 10*3/mm3      Monocytes Absolute 2.48 (H) 10*3/mm3      Eosinophils Absolute 0.50 10*3/mm3      RBC Morphology Normal     WBC Morphology Normal     Platelet Morphology Normal    Basic Metabolic Panel [[id number]]  (Abnormal) Collected:  07/12/17 0430    Specimen:  Blood Updated:  07/12/17 0517     Glucose 114 (H) mg/dL      BUN 32 (H) mg/dL      Creatinine 0.98 mg/dL      Sodium 140 mmol/L      Potassium 3.2 (L) mmol/L      Chloride 106 mmol/L      CO2 21.1 (L) mmol/L      Calcium 8.0 (L) mg/dL      eGFR Non African Amer 76 mL/min/1.73      BUN/Creatinine Ratio 32.7 (H)     Anion Gap 12.9 mmol/L     Narrative:       GFR Normal >60  Chronic Kidney Disease <60  Kidney Failure <15    CBC & Differential [[id number]] Collected:  07/12/17 0430    Specimen:  Blood Updated:  07/12/17 0620    Narrative:       The following orders were created for panel order CBC & Differential.  Procedure                               Abnormality         Status                     ---------                               -----------         ------                     Manual Differential[[id number]]          Abnormal            Final result               Scan Slide[[id number]]                                       Final result               CBC Auto Differential[[id number]]        Abnormal            Final result                 Please view results for these tests on the individual orders.    CBC Auto Differential [[id number]]  (Abnormal) Collected:  07/12/17 0430    Specimen:  Blood Updated:  07/12/17 0620     WBC 22.32 (H) 10*3/mm3      RBC 4.17 (L) 10*6/mm3      Hemoglobin  13.0 (L) g/dL      Hematocrit 40.2 (L) %      MCV 96.4 (H) fL      MCH 31.2 pg      MCHC 32.3 (L) g/dL      RDW 14.0 %      RDW-SD 49.7 fl      MPV 12.8 (H) fL      Platelets 271 10*3/mm3     Scan Slide [[id number]] Collected:  07/12/17 0430    Specimen:  Blood Updated:  07/12/17 0620     Scan Slide --      See Manual Differential Results       Manual Differential [[id number]]  (Abnormal) Collected:  07/12/17 0430    Specimen:  Blood Updated:  07/12/17 0620     Neutrophil % 68.0 %      Lymphocyte % 17.0 (L) %      Monocyte % 5.0 %      Eosinophil % 5.0 %      Metamyelocyte % 2.0 (H) %      Myelocyte % 3.0 (H) %      Neutrophils Absolute 15.18 (H) 10*3/mm3      Lymphocytes Absolute 3.79 10*3/mm3      Monocytes Absolute 1.12 10*3/mm3      Eosinophils Absolute 1.12 (H) 10*3/mm3      RBC Morphology Normal     WBC Morphology Normal     Platelet Morphology Normal    Stool Culture [[id number]] Collected:  07/10/17 1148    Specimen:  Stool from Per Rectum Updated:  07/12/17 1040     Stool Culture No Salmonella, Shigella, Campylobacter or E coli O157:H7 isolated.      Normal Fecal Jacquie    Potassium [[id number]]  (Normal) Collected:  07/12/17 2006    Specimen:  Blood Updated:  07/12/17 2126     Potassium 4.1 mmol/L     Basic Metabolic Panel [[id number]]  (Abnormal) Collected:  07/13/17 0351    Specimen:  Blood Updated:  07/13/17 0525     Glucose 106 (H) mg/dL      BUN 15 mg/dL      Creatinine 0.88 mg/dL      Sodium 139 mmol/L      Potassium 3.6 mmol/L      Chloride 105 mmol/L      CO2 21.0 (L) mmol/L      Calcium 7.8 (L) mg/dL      eGFR Non African Amer 86 mL/min/1.73      BUN/Creatinine Ratio 17.0     Anion Gap 13.0 mmol/L     Narrative:       GFR Normal >60  Chronic Kidney Disease <60  Kidney Failure <15    CBC Auto Differential [[id number]]  (Abnormal) Collected:  07/13/17 0351    Specimen:  Blood Updated:  07/13/17 0624     WBC 18.63 (H) 10*3/mm3      RBC 4.12 (L) 10*6/mm3      Hemoglobin 12.6 (L) g/dL      Hematocrit 38.9 (L)  %      MCV 94.4 fL      MCH 30.6 pg      MCHC 32.4 (L) g/dL      RDW 14.1 %      RDW-SD 48.4 fl      MPV 12.8 (H) fL      Platelets 284 10*3/mm3     CBC & Differential [[id number]] Collected:  07/13/17 0351    Specimen:  Blood Updated:  07/13/17 0624    Narrative:       The following orders were created for panel order CBC & Differential.  Procedure                               Abnormality         Status                     ---------                               -----------         ------                     Manual Differential[[id number]]          Abnormal            Final result               Scan Slide[[id number]]                                       Final result               CBC Auto Differential[[id number]]        Abnormal            Final result                 Please view results for these tests on the individual orders.    Scan Slide [[id number]] Collected:  07/13/17 0351    Specimen:  Blood Updated:  07/13/17 0624     Scan Slide --      See Manual Differential Results       Manual Differential [[id number]]  (Abnormal) Collected:  07/13/17 0351    Specimen:  Blood Updated:  07/13/17 0624     Neutrophil % 84.0 (H) %      Lymphocyte % 6.0 (L) %      Monocyte % 7.0 %      Eosinophil % 1.0 %      Metamyelocyte % 2.0 (H) %      Neutrophils Absolute 15.65 (H) 10*3/mm3      Lymphocytes Absolute 1.12 10*3/mm3      Monocytes Absolute 1.30 (H) 10*3/mm3      Eosinophils Absolute 0.19 10*3/mm3      RBC Morphology Normal     WBC Morphology Normal     Platelet Morphology Normal    Potassium [[id number]]  (Normal) Collected:  07/13/17 1652    Specimen:  Blood Updated:  07/13/17 1742     Potassium 3.9 mmol/L     Basic Metabolic Panel [[id number]]  (Abnormal) Collected:  07/14/17 0340    Specimen:  Blood Updated:  07/14/17 0436     Glucose 101 (H) mg/dL      BUN 9 mg/dL      Creatinine 0.71 (L) mg/dL      Sodium 143 mmol/L      Potassium 3.7 mmol/L      Chloride 112 (H) mmol/L      CO2 19.5 (L) mmol/L      Calcium 8.2 (L) mg/dL       eGFR Non African Amer 110 mL/min/1.73      BUN/Creatinine Ratio 12.7     Anion Gap 11.5 mmol/L     Narrative:       GFR Normal >60  Chronic Kidney Disease <60  Kidney Failure <15    CBC Auto Differential [[id number]]  (Abnormal) Collected:  07/14/17 0340    Specimen:  Blood Updated:  07/14/17 0609     WBC 19.15 (H) 10*3/mm3      RBC 3.96 (L) 10*6/mm3      Hemoglobin 12.6 (L) g/dL      Hematocrit 37.9 (L) %      MCV 95.7 fL      MCH 31.8 pg      MCHC 33.2 g/dL      RDW 14.0 %      RDW-SD 48.9 fl      MPV 12.5 (H) fL      Platelets 279 10*3/mm3     Scan Slide [[id number]] Collected:  07/14/17 0340    Specimen:  Blood Updated:  07/14/17 0609     Scan Slide --      See Manual Differential Results       Manual Differential [[id number]]  (Abnormal) Collected:  07/14/17 0340    Specimen:  Blood Updated:  07/14/17 0609     Neutrophil % 76.0 %      Lymphocyte % 11.0 (L) %      Monocyte % 9.0 %      Eosinophil % 2.0 %      Metamyelocyte % 1.0 (H) %      Myelocyte % 1.0 (H) %      Neutrophils Absolute 14.55 (H) 10*3/mm3      Lymphocytes Absolute 2.11 10*3/mm3      Monocytes Absolute 1.72 (H) 10*3/mm3      Eosinophils Absolute 0.38 10*3/mm3      RBC Morphology Normal     Smudge Cells Slight/1+     Large Platelets Slight/1+    CBC & Differential [[id number]] Collected:  07/14/17 0340    Specimen:  Blood Updated:  07/14/17 0609    Narrative:       The following orders were created for panel order CBC & Differential.  Procedure                               Abnormality         Status                     ---------                               -----------         ------                     Manual Differential[[id number]]          Abnormal            Final result               Scan Slide[[id number]]                                       Final result               CBC Auto Differential[[id number]]        Abnormal            Final result                 Please view results for these tests on the individual orders.        /64 (BP  "Location: Right arm, Patient Position: Sitting)  Pulse 62  Temp 98 °F (36.7 °C) (Oral)   Resp 16  Ht 72\" (182.9 cm)  Wt 263 lb (119 kg)  SpO2 95%  BMI 35.67 kg/m2    Discharge Exam:  General Appearance:    Alert, cooperative, no distress                          Head:    Normocephalic, without obvious abnormality, atraumatic                          Eyes:                            Throat:   Lips, tongue, gums normal                          Neck:   Supple, symmetrical, trachea midline, no JVD                        Lungs:     Clear to auscultation bilaterally, respirations unlabored                Chest Wall:    No tenderness or deformity                        Heart:    Regular rate and rhythm, S1 and S2 normal, no murmur,no  Rub or gallop                  Abdomen:     Soft, non-tender, bowel sounds active, no masses, no  organomegaly                  Extremities:   Extremities normal, Right hip with surgical changes, no cyanosis or edema                             Skin:   Skin is warm and dry,  no rashes or palpable lesions                  Neurologic:   no focal deficits noted     Disposition:  Home with home health    Patient Instructions:    Demetris Nye   Home Medication Instructions YOVANY:[id number]    Printed on:07/14/17 5041   Medication Information                      aspirin 81 MG chewable tablet  Chew 81 mg Daily.             atorvastatin (LIPITOR) 10 MG tablet  Take 10 mg by mouth Daily.             carvedilol (COREG) 12.5 MG tablet  Take 12.5 mg by mouth Daily.             sulindac (CLINORIL) 200 MG tablet  Take 200 mg by mouth 2 (Two) Times a Day.             vancomycin 50 MG/ML solution oral solution  Take 5 mL by mouth Every 6 (Six) Hours for 10 days. Indications: Clostridium Difficile Infection               No future appointments.  Additional Instructions for the Follow-ups that You Need to Schedule     Ambulatory Referral to Home Health    As directed    Face to Face Visit Date:  " 7/14/2017   Follow-up Provider for Plan of Care?:  I treated the patient in an acute care facility and will not continue treatment after discharge.   Follow-up Provider:  RIAN TOMLINSON   Reason/Clinical Findings:  hip fracture   Describe mobility limitations that make leaving home difficult:  hip fracture   Nursing/Therapeutic Services Requested:   Skilled Nursing  Physical Therapy      Skilled nursing orders:  Other   PT orders:  Other   Frequency:  1 Week 1             Follow-up Information     Follow up with [facility] .    Specialty:  Home Health Services    Contact information:    [address]  [facility] [id number] [phone]        Follow up with Manuelito Alba MD .    Specialty:  Orthopedic Surgery    Contact information:    [address]  [facility] [id number] [phone]          Follow up with Rian Tomlinson MD Follow up in 1 week(s).    Specialty:  Family Medicine    Contact information:    [id number]  [facility] [id number] [phone]          Discharge Order     Start     Ordered    07/14/17 1054  Discharge patient  Once     Expected Discharge Date:  07/14/17    Discharge Disposition:  Home-Health Care [facility]        07/14/17 1055          Total time spent discharging patient including evaluation,post hospitalization follow up,  medication and post hospitalization instructions and education total time exceeds 30 minutes.    Signed:  Joao Balbuena MD  7/14/2017  10:55 AM

## 2017-07-14 NOTE — CONSULTS
Adult Nutrition  Assessment/PES    Patient Name:  Demetris Nye  YOB: 1945  MRN: [id number]  Admit Date:  6/30/2017    Assessment Date:  7/14/2017        Reason for Assessment       07/14/17 1256    Reason for Assessment    Reason For Assessment/Visit nurse/nurse practitioner consult;follow up protocol                  Labs/Tests/Procedures/Meds       07/14/17 1257    Labs/Tests/Procedures/Meds    Diagnostic Test/Procedure Review reviewed    Labs/Tests Review Reviewed;Glucose    Medication Review Reviewed, pertinent    Significant Vitals reviewed                Nutrition Prescription Ordered       07/14/17 1257    Nutrition Prescription PO    Common Modifiers GI Soft/[place]            Evaluation of Received Nutrient/Fluid Intake       07/14/17 1257    PO Evaluation    Number of Meals 2    % PO Intake 75              Problem/Interventions:                  Intervention Goal       07/14/17 1257    Intervention Goal    General Maintain nutrition    PO Tolerate PO;Maintain intake    Weight Maintain weight            Nutrition Intervention       07/14/17 1257    Nutrition Intervention    RD/Tech Action Follow Tx progress;Care plan reviewd;Interview for preference;Encourage intake            Nutrition Prescription       07/14/17 1257    Nutrition Prescription PO    PO Prescription Discontinue supplement            Education/Evaluation       07/14/17 1257    Education    Education Provided education regarding    Monitor/Evaluation    Monitor Per protocol    Education Follow-up Rein[employer] PRN        Patient had questions regarding diet to follow at home-went over education material; RD to monitor and follow   Electronically signed by:  Kelley Katz RD  07/14/17 12:58 PM

## 2017-07-15 ENCOUNTER — HOSPITAL ENCOUNTER (INPATIENT)
Facility: HOSPITAL | Age: 72
LOS: 5 days | Discharge: NURSING, REHAB OR LONG-TERM CARE | End: 2017-07-20
Attending: EMERGENCY MEDICINE | Admitting: INTERNAL MEDICINE

## 2017-07-15 ENCOUNTER — APPOINTMENT (OUTPATIENT)
Dept: GENERAL RADIOLOGY | Facility: HOSPITAL | Age: 72
End: 2017-07-15

## 2017-07-15 ENCOUNTER — APPOINTMENT (OUTPATIENT)
Dept: CARDIOLOGY | Facility: HOSPITAL | Age: 72
End: 2017-07-15
Attending: EMERGENCY MEDICINE

## 2017-07-15 ENCOUNTER — APPOINTMENT (OUTPATIENT)
Dept: ULTRASOUND IMAGING | Facility: HOSPITAL | Age: 72
End: 2017-07-15

## 2017-07-15 DIAGNOSIS — A04.72 C. DIFFICILE DIARRHEA: ICD-10-CM

## 2017-07-15 DIAGNOSIS — E88.09 HYPOALBUMINEMIA: ICD-10-CM

## 2017-07-15 DIAGNOSIS — R60.0 BILATERAL EDEMA OF LOWER EXTREMITY: Primary | ICD-10-CM

## 2017-07-15 DIAGNOSIS — S80.821A: ICD-10-CM

## 2017-07-15 DIAGNOSIS — R26.2 DIFFICULTY WALKING: ICD-10-CM

## 2017-07-15 LAB
ALBUMIN SERPL-MCNC: 2.8 G/DL (ref 3.5–5.2)
ALBUMIN/GLOB SERPL: 0.9 G/DL
ALP SERPL-CCNC: 85 U/L (ref 39–117)
ALT SERPL W P-5'-P-CCNC: 118 U/L (ref 1–41)
ANION GAP SERPL CALCULATED.3IONS-SCNC: 10.6 MMOL/L
APAP SERPL-MCNC: <5 MCG/ML (ref 10–30)
APTT PPP: 32.2 SECONDS (ref 22.7–35.4)
AST SERPL-CCNC: 77 U/L (ref 1–40)
BASOPHILS # BLD AUTO: 0.06 10*3/MM3 (ref 0–0.2)
BASOPHILS NFR BLD AUTO: 0.4 % (ref 0–1.5)
BILIRUB SERPL-MCNC: 0.8 MG/DL (ref 0.1–1.2)
BUN BLD-MCNC: 8 MG/DL (ref 8–23)
BUN/CREAT SERPL: 9.6 (ref 7–25)
CALCIUM SPEC-SCNC: 8.6 MG/DL (ref 8.6–10.5)
CHLORIDE SERPL-SCNC: 107 MMOL/L (ref 98–107)
CO2 SERPL-SCNC: 24.4 MMOL/L (ref 22–29)
CREAT BLD-MCNC: 0.83 MG/DL (ref 0.76–1.27)
DEPRECATED RDW RBC AUTO: 46.4 FL (ref 37–54)
EOSINOPHIL # BLD AUTO: 0.33 10*3/MM3 (ref 0–0.7)
EOSINOPHIL NFR BLD AUTO: 2.3 % (ref 0.3–6.2)
ERYTHROCYTE [DISTWIDTH] IN BLOOD BY AUTOMATED COUNT: 13.9 % (ref 11.5–14.5)
GFR SERPL CREATININE-BSD FRML MDRD: 92 ML/MIN/1.73
GLOBULIN UR ELPH-MCNC: 3 GM/DL
GLUCOSE BLD-MCNC: 136 MG/DL (ref 65–99)
HCT VFR BLD AUTO: 36.8 % (ref 40.4–52.2)
HGB BLD-MCNC: 12.4 G/DL (ref 13.7–17.6)
IMM GRANULOCYTES # BLD: 0.41 10*3/MM3 (ref 0–0.03)
IMM GRANULOCYTES NFR BLD: 2.9 % (ref 0–0.5)
INR PPP: 1.42 (ref 0.9–1.1)
LYMPHOCYTES # BLD AUTO: 2.35 10*3/MM3 (ref 0.9–4.8)
LYMPHOCYTES NFR BLD AUTO: 16.6 % (ref 19.6–45.3)
MCH RBC QN AUTO: 30.8 PG (ref 27–32.7)
MCHC RBC AUTO-ENTMCNC: 33.7 G/DL (ref 32.6–36.4)
MCV RBC AUTO: 91.5 FL (ref 79.8–96.2)
MONOCYTES # BLD AUTO: 1.11 10*3/MM3 (ref 0.2–1.2)
MONOCYTES NFR BLD AUTO: 7.8 % (ref 5–12)
NEUTROPHILS # BLD AUTO: 9.9 10*3/MM3 (ref 1.9–8.1)
NEUTROPHILS NFR BLD AUTO: 70 % (ref 42.7–76)
NT-PROBNP SERPL-MCNC: 378.4 PG/ML (ref 0–900)
PLATELET # BLD AUTO: 277 10*3/MM3 (ref 140–500)
PMV BLD AUTO: 12.4 FL (ref 6–12)
POTASSIUM BLD-SCNC: 3.5 MMOL/L (ref 3.5–5.2)
PROT SERPL-MCNC: 5.8 G/DL (ref 6–8.5)
PROTHROMBIN TIME: 16.9 SECONDS (ref 11.7–14.2)
RBC # BLD AUTO: 4.02 10*6/MM3 (ref 4.6–6)
SODIUM BLD-SCNC: 142 MMOL/L (ref 136–145)
WBC NRBC COR # BLD: 14.16 10*3/MM3 (ref 4.5–10.7)

## 2017-07-15 PROCEDURE — 85610 PROTHROMBIN TIME: CPT | Performed by: EMERGENCY MEDICINE

## 2017-07-15 PROCEDURE — 80053 COMPREHEN METABOLIC PANEL: CPT | Performed by: EMERGENCY MEDICINE

## 2017-07-15 PROCEDURE — 76700 US EXAM ABDOM COMPLETE: CPT

## 2017-07-15 PROCEDURE — 71010 HC CHEST PA OR AP: CPT

## 2017-07-15 PROCEDURE — 85730 THROMBOPLASTIN TIME PARTIAL: CPT | Performed by: EMERGENCY MEDICINE

## 2017-07-15 PROCEDURE — 85025 COMPLETE CBC W/AUTO DIFF WBC: CPT | Performed by: EMERGENCY MEDICINE

## 2017-07-15 PROCEDURE — 80307 DRUG TEST PRSMV CHEM ANLYZR: CPT | Performed by: INTERNAL MEDICINE

## 2017-07-15 PROCEDURE — 99284 EMERGENCY DEPT VISIT MOD MDM: CPT

## 2017-07-15 PROCEDURE — 93970 EXTREMITY STUDY: CPT

## 2017-07-15 PROCEDURE — 83880 ASSAY OF NATRIURETIC PEPTIDE: CPT | Performed by: EMERGENCY MEDICINE

## 2017-07-15 RX ORDER — CARVEDILOL 12.5 MG/1
12.5 TABLET ORAL DAILY
Status: DISCONTINUED | OUTPATIENT
Start: 2017-07-16 | End: 2017-07-20 | Stop reason: HOSPADM

## 2017-07-15 RX ORDER — SODIUM CHLORIDE 0.9 % (FLUSH) 0.9 %
1-10 SYRINGE (ML) INJECTION AS NEEDED
Status: DISCONTINUED | OUTPATIENT
Start: 2017-07-15 | End: 2017-07-20 | Stop reason: HOSPADM

## 2017-07-15 RX ORDER — SODIUM CHLORIDE 0.9 % (FLUSH) 0.9 %
10 SYRINGE (ML) INJECTION AS NEEDED
Status: DISCONTINUED | OUTPATIENT
Start: 2017-07-15 | End: 2017-07-20 | Stop reason: HOSPADM

## 2017-07-15 RX ORDER — ASPIRIN 81 MG/1
81 TABLET, CHEWABLE ORAL DAILY
Status: DISCONTINUED | OUTPATIENT
Start: 2017-07-16 | End: 2017-07-20 | Stop reason: HOSPADM

## 2017-07-15 NOTE — ED TRIAGE NOTES
Pt was discharged from hospital yesterday. Per pt he was admitted for a broken right hip with surgical repair and concurrent C-Diff infection. Pt states that after dc'ed, his diarrhea has gotten worse and now he has +4 edema in bilateral feet and ankles.

## 2017-07-15 NOTE — ED PROVIDER NOTES
" EMERGENCY DEPARTMENT ENCOUNTER    CHIEF COMPLAINT  Chief Complaint: Leg edema  History given by: Pt, Pt's family member  History limited by: Nothing  Room Number: 24/24  PMD: Rian Morfin MD      HPI:  Pt is a 71 y.o. male who presents complaining of leg edema onset one day ago. He also complains of bilateral feet edema and diarrhea but denies CP, SOA, chills, nausea, fever or any other symptoms at this time. Pt was DC from the hospital one day ago for C-Diff infection after hip surgery. Pt denies hx of CHF.    Duration:  One day  Onset: gradual  Timing: constant  Location: bilateral leg  Radiation: none  Quality: \"swollen\"  Intensity/Severity: moderate  Progression: unchanged  Associated Symptoms: feet edema, diarrhea  Aggravating Factors: none  Alleviating Factors: none  Previous Episodes: Pt was seen in the hospital one day ago for C-Diff infection.  Treatment before arrival: Pt received no treatment PTA.    PAST MEDICAL HISTORY  Active Ambulatory Problems     Diagnosis Date Noted   • Fall 06/30/2017   • Closed right hip fracture 06/30/2017   • HTN (hypertension) 06/30/2017   • PVC (premature ventricular contraction) 06/30/2017   • RUDY 07/09/2017   • Hyponatremia 07/09/2017   • Ileus, postoperative 07/09/2017   • Colitis 07/10/2017   • C. difficile colitis 07/10/2017     Resolved Ambulatory Problems     Diagnosis Date Noted   • No Resolved Ambulatory Problems     Past Medical History:   Diagnosis Date   • Arthritis    • Hyperlipidemia    • Hypertension    • Irregular cardiac rhythm        PAST SURGICAL HISTORY  Past Surgical History:   Procedure Laterality Date   • AR OPEN FIX INTER/SUBTROCH FX,IMPLNT Right 7/1/2017    Procedure: RIGHT INTRAMEDULLARY NAILING/RODDING;  Surgeon: Manuelito Alba MD;  Location: [facility];  Service: Orthopedics       FAMILY HISTORY  History reviewed. No pertinent family history.    SOCIAL HISTORY  Social History     Social History   • Marital status:      Spouse " name: N/A   • Number of children: N/A   • Years of education: N/A     Occupational History   • Not on file.     Social History Main Topics   • Smoking status: Current Every Day Smoker   • Smokeless tobacco: Not on file   • Alcohol use No   • Drug use: No   • Sexual activity: Defer     Other Topics Concern   • Not on file     Social History Narrative       ALLERGIES  Review of patient's allergies indicates no known allergies.    REVIEW OF SYSTEMS  Review of Systems   Constitutional: Negative for activity change, appetite change and fever.   HENT: Negative for congestion and sore throat.    Eyes: Negative.    Respiratory: Negative for cough and shortness of breath.    Cardiovascular: Negative for chest pain and leg swelling.   Gastrointestinal: Positive for diarrhea. Negative for abdominal pain and vomiting.   Endocrine: Negative.    Genitourinary: Negative for decreased urine volume and dysuria.   Musculoskeletal: Negative for neck pain.        Pt has bilateral leg and feet edema.   Skin: Negative for rash and wound.   Allergic/Immunologic: Negative.    Neurological: Negative for weakness, numbness and headaches.   Hematological: Negative.    Psychiatric/Behavioral: Negative.    All other systems reviewed and are negative.      PHYSICAL EXAM  ED Triage Vitals   Temp Heart Rate Resp BP SpO2   07/15/17 1640 07/15/17 1640 07/15/17 1640 07/15/17 1647 07/15/17 1640   99.1 °F (37.3 °C) 76 18 139/84 97 %      Temp src Heart Rate Source Patient Position BP Location FiO2 (%)   -- -- -- -- --              Physical Exam   Constitutional: He is oriented to person, place, and time and well-developed, well-nourished, and in no distress. No distress.   HENT:   Head: Normocephalic and atraumatic.   Mouth/Throat: Oropharynx is clear and moist.   Eyes: EOM are normal. Pupils are equal, round, and reactive to light.   Neck: Normal range of motion. Neck supple. No JVD present. Carotid bruit is not present.   Cardiovascular: Normal rate,  regular rhythm and normal heart sounds.  Exam reveals no gallop and no friction rub.    No murmur heard.  Pulmonary/Chest: Effort normal and breath sounds normal. No respiratory distress.   Abdominal: Soft. Bowel sounds are normal. He exhibits no distension. There is no tenderness. There is no rebound and no guarding.   Musculoskeletal: Normal range of motion. He exhibits no edema.   Pt has moderate to severe bilateral edema beginning at the knees to toes.   Lymphadenopathy:     He has no cervical adenopathy.   Neurological: He is alert and oriented to person, place, and time. He has normal sensation and normal strength.   Skin: Skin is warm and dry.   Pt has blistering of skin at R leg.   Psychiatric: Mood and affect normal.   Nursing note and vitals reviewed.      LAB RESULTS  Lab Results (last 24 hours)     Procedure Component Value Units Date/Time    CBC & Differential [[id number]] Collected:  07/15/17 1737    Specimen:  Blood Updated:  07/15/17 1813    Narrative:       The following orders were created for panel order CBC & Differential.  Procedure                               Abnormality         Status                     ---------                               -----------         ------                     Scan Slide[[id number]]                                                                  CBC Auto Differential[[id number]]        Abnormal            Final result                 Please view results for these tests on the individual orders.    Comprehensive Metabolic Panel [[id number]]  (Abnormal) Collected:  07/15/17 1737    Specimen:  Blood Updated:  07/15/17 1807     Glucose 136 (H) mg/dL      BUN 8 mg/dL      Creatinine 0.83 mg/dL      Sodium 142 mmol/L      Potassium 3.5 mmol/L      Chloride 107 mmol/L      CO2 24.4 mmol/L      Calcium 8.6 mg/dL      Total Protein 5.8 (L) g/dL      Albumin 2.80 (L) g/dL      ALT (SGPT) 118 (H) U/L      AST (SGOT) 77 (H) U/L      Alkaline Phosphatase 85 U/L      Total  Bilirubin 0.8 mg/dL      eGFR Non African Amer 92 mL/min/1.73      Globulin 3.0 gm/dL      A/G Ratio 0.9 g/dL      BUN/Creatinine Ratio 9.6     Anion Gap 10.6 mmol/L     Protime-INR [[id number]]  (Abnormal) Collected:  07/15/17 1737    Specimen:  Blood Updated:  07/15/17 1813     Protime 16.9 (H) Seconds      INR 1.42 (H)    aPTT [[id number]]  (Normal) Collected:  07/15/17 1737    Specimen:  Blood Updated:  07/15/17 1813     PTT 32.2 seconds     BNP [[id number]]  (Normal) Collected:  07/15/17 1737    Specimen:  Blood Updated:  07/15/17 1807     proBNP 378.4 pg/mL     Narrative:       Among patients with dyspnea, NT-proBNP is highly sensitive for the detection of acute congestive heart failure. In addition NT-proBNP of <300 pg/ml effectively rules out acute congestive heart failure with 99% negative predictive value.    CBC Auto Differential [[id number]]  (Abnormal) Collected:  07/15/17 1737    Specimen:  Blood Updated:  07/15/17 1813     WBC 14.16 (H) 10*3/mm3      RBC 4.02 (L) 10*6/mm3      Hemoglobin 12.4 (L) g/dL      Hematocrit 36.8 (L) %      MCV 91.5 fL      MCH 30.8 pg      MCHC 33.7 g/dL      RDW 13.9 %      RDW-SD 46.4 fl      MPV 12.4 (H) fL      Platelets 277 10*3/mm3      Neutrophil % 70.0 %      Lymphocyte % 16.6 (L) %      Monocyte % 7.8 %      Eosinophil % 2.3 %      Basophil % 0.4 %      Immature Grans % 2.9 (H) %      Neutrophils, Absolute 9.90 (H) 10*3/mm3      Lymphocytes, Absolute 2.35 10*3/mm3      Monocytes, Absolute 1.11 10*3/mm3      Eosinophils, Absolute 0.33 10*3/mm3      Basophils, Absolute 0.06 10*3/mm3      Immature Grans, Absolute 0.41 (H) 10*3/mm3           I ordered the above labs and reviewed the results    RADIOLOGY  XR Chest 1 View   Final Result       No active disease by portable imaging.       This report was finalized on 7/15/2017 7:21 PM by Efra Mcdowell MD.               I ordered the above noted radiological studies. Interpreted by radiologist. Reviewed by me in PACS.        PROCEDURES  Procedures      PROGRESS AND CONSULTS  ED Course     1729 Ordered Urinalysis, CMP, CBC, XR Chest, Protime-INR, aPTT, and BNP for further evaluation    1800 Placed call to A    1915 Discussed pt with Dr. Johansen (hospitalist) who agrees to admit the pt to [facility]    1919 BP- 138/81 HR- 60 Temp- 99.1 °F (37.3 °C) O2 sat- 97%. Rechecked the patient who is in NAD and is resting comfortably. Albumin levels are low which may be the cause of his leg edema. Informed pt of unremarkable lab results. Will admit. Pt understands and agrees with the plan. All questions answered.    MEDICAL DECISION MAKING  Results were reviewed/discussed with the patient and they were also made aware of online access. Pt also made aware that some labs, such as cultures, will not be resulted during ER visit and follow up with PMD is necessary.     MDM  Number of Diagnoses or Management Options  Bilateral edema of lower extremity:   Blister of leg, left, initial encounter:   C. difficile diarrhea:   Hypoalbuminemia:      Amount and/or Complexity of Data Reviewed  Clinical lab tests: reviewed and ordered (WBC - 14.16, Protime - 16.9, INR - 1.42)  Tests in the radiology section of CPT®: ordered and reviewed (XR Chest shows NAD.)  Decide to obtain previous medical records or to obtain history from someone other than the patient: yes  Review and summarize past medical records: yes  Discuss the patient with other providers: yes (Dr. Johansen (hospitalist))  Independent visualization of images, tracings, or specimens: yes    Patient Progress  Patient progress: stable         DIAGNOSIS  Final diagnoses:   Bilateral edema of lower extremity   Blister of leg, left, initial encounter   C. difficile diarrhea   Hypoalbuminemia       DISPOSITION  ADMISSION    Discussed treatment plan and reason for admission with pt/family and admitting physician.  Pt/family voiced understanding of the plan for admission for further testing/treatment as  needed.       Latest Documented Vital Signs:  As of 7:27 PM  BP- 138/81 HR- 60 Temp- 99.1 °F (37.3 °C) O2 sat- 97%    --  Documentation assistance provided by fred Chávez for Dr. Cruz.  Information recorded by the scribe was done at my direction and has been verified and validated by me.     Eduarda Chávez  07/15/17 1733       Eduarda Chávez  07/15/17 1927       Rolando Cruz MD  07/15/17 1930

## 2017-07-15 NOTE — H&P
Patient Identification:  Name: Demetris Nye  Age/Sex: 71 y.o. male  :  1945  MRN: [id number]         Primary Care Physician: Rian Morfin MD    Chief Complaint   Patient presents with   • Leg Swelling     bilateral edema    • Diarrhea     Active C-Diff treatment       Subjective   Patient is a 71 y.o. male with a h/o HTN who was just admitted from  to  with right hip fracture. That hospital course was complicated by RUDY, CDiff colitis and ileus. He was discharged to home yesterday and comes back in today stating that his LEs have become much more swollen. States there was no edema prior to discharge. He denies any sob. Still having loose bowel movements and feels they're about the same. No fevers or chills. No other meds other than the ones he was prescribed at discharge.     History of Present Illness    Past Medical History:   Diagnosis Date   • Arthritis    • Hyperlipidemia    • Hypertension    • Irregular cardiac rhythm      Past Surgical History:   Procedure Laterality Date   • AL OPEN FIX INTER/SUBTROCH FX,IMPLNT Right 2017    Procedure: RIGHT INTRAMEDULLARY NAILING/RODDING;  Surgeon: Manuelito Alba MD;  Location: [facility];  Service: Orthopedics     History reviewed. No pertinent family history.  Social History   Substance Use Topics   • Smoking status: Current Every Day Smoker   • Smokeless tobacco: None   • Alcohol use No       (Not in a hospital admission)  Allergies:  Review of patient's allergies indicates no known allergies.    Review of Systems   Constitutional: Negative.    HENT: Negative.    Eyes: Negative.    Respiratory: Negative.    Cardiovascular: Positive for leg swelling. Negative for palpitations.   Gastrointestinal: Positive for abdominal distention and diarrhea. Negative for abdominal pain, nausea and vomiting.   Endocrine: Negative.    Genitourinary: Negative.    Musculoskeletal: Negative.    Skin: Negative.    Neurological: Negative.     Hematological: Negative.    Psychiatric/Behavioral: Negative.         Objective    Vital Signs  Temp:  [99.1 °F (37.3 °C)] 99.1 °F (37.3 °C)  Heart Rate:  [60-76] 60  Resp:  [18] 18  BP: (138-139)/(81-84) 138/81  Body mass index is 35.26 kg/(m^2).    Physical Exam   Constitutional: He is oriented to person, place, and time. He appears well-developed and well-nourished.   HENT:   Head: Normocephalic and atraumatic.   Mouth/Throat: No oropharyngeal exudate.   Eyes: Conjunctivae are normal. No scleral icterus.   Neck: Normal range of motion. No JVD present. No tracheal deviation present.   Cardiovascular: Normal rate and regular rhythm.    No murmur heard.  Pulmonary/Chest: Effort normal.   Crackles at bases   Abdominal: Soft. Bowel sounds are normal. He exhibits distension. There is no tenderness.   Musculoskeletal: He exhibits edema. He exhibits no deformity.   2+ LE edema   Neurological: He is alert and oriented to person, place, and time.   Skin: Skin is warm and dry.   Psychiatric: He has a normal mood and affect. His behavior is normal. Judgment and thought content normal.       Results Review:   I reviewed the patient's new clinical results.  Imaging Results (last 24 hours)     Procedure Component Value Units Date/Time    XR Chest 1 View [[id number]] Collected:  07/15/17 1920     Updated:  07/15/17 1924    Narrative:       PORTABLE CHEST     CLINICAL HISTORY:  leg swelling  soa     COMPARISON:  07/07/2017.     FINDINGS:  Single portable view of the chest obtained.  The lungs are  well expanded and clear.  Cardiac size is within normal limits.   Vascularity is normal considering technique.  No pleural fluid is  demonstrated by portable imaging. Minimal thoracic aortic ectasia.          Impression:          No active disease by portable imaging.     This report was finalized on 7/15/2017 7:21 PM by Efra Mcdowell MD.             Assessment/Plan     Principal Problem:    Bilateral edema of lower extremity  Active  Problems:    HTN (hypertension)    C. difficile colitis      Assessment & Plan  1. LE Edema  - per notes on day of discharge he had no LE edema at that time  - unclear cause. Albumin has dropped from 4 during prior admission to 2.8   - checking urine to see if he has any proteinuria  - abdominal ultrasound as his abd does look distended but it is hard to tell if there is any fluid there. If ascites present may need to check portal flow studies  - lungs are clear and BNP is normal so think this is less likely cardiac related  - LE venous ultrasound negative for DVT    2. Elevated AST/ALT  - were normal on 7/1. Drug induced? Holding statin  - INR is also slightly elevated  - checking abdominal ultrasound for ascites and for liver morphology.   - check hep panel and tylenol level  - monitor INR and AST/ALT daily  - consider GI consult if not improving    3. CDiff  - WBC is trending down  - will cont oral vanc, needs another 9 days      I discussed the patients findings and my recommendations with patient and family.          Arsenio Johansen MD  [facility]  07/15/17  8:13 PM

## 2017-07-16 ENCOUNTER — APPOINTMENT (OUTPATIENT)
Dept: CARDIOLOGY | Facility: HOSPITAL | Age: 72
End: 2017-07-16
Attending: INTERNAL MEDICINE

## 2017-07-16 LAB
ALBUMIN SERPL-MCNC: 2.7 G/DL (ref 3.5–5.2)
ALBUMIN/GLOB SERPL: 1 G/DL
ALP SERPL-CCNC: 80 U/L (ref 39–117)
ALT SERPL W P-5'-P-CCNC: 104 U/L (ref 1–41)
ANION GAP SERPL CALCULATED.3IONS-SCNC: 9.4 MMOL/L
AST SERPL-CCNC: 63 U/L (ref 1–40)
BASOPHILS # BLD AUTO: 0.03 10*3/MM3 (ref 0–0.2)
BASOPHILS NFR BLD AUTO: 0.2 % (ref 0–1.5)
BH CV LOWER VASCULAR LEFT COMMON FEMORAL AUGMENT: NORMAL
BH CV LOWER VASCULAR LEFT COMMON FEMORAL COMPETENT: NORMAL
BH CV LOWER VASCULAR LEFT COMMON FEMORAL COMPRESS: NORMAL
BH CV LOWER VASCULAR LEFT COMMON FEMORAL PHASIC: NORMAL
BH CV LOWER VASCULAR LEFT COMMON FEMORAL SPONT: NORMAL
BH CV LOWER VASCULAR LEFT DISTAL FEMORAL COMPRESS: NORMAL
BH CV LOWER VASCULAR LEFT GASTRONEMIUS COMPRESS: NORMAL
BH CV LOWER VASCULAR LEFT GREATER SAPH AK COMPRESS: NORMAL
BH CV LOWER VASCULAR LEFT GREATER SAPH BK COMPRESS: NORMAL
BH CV LOWER VASCULAR LEFT MID FEMORAL AUGMENT: NORMAL
BH CV LOWER VASCULAR LEFT MID FEMORAL COMPETENT: NORMAL
BH CV LOWER VASCULAR LEFT MID FEMORAL COMPRESS: NORMAL
BH CV LOWER VASCULAR LEFT MID FEMORAL PHASIC: NORMAL
BH CV LOWER VASCULAR LEFT MID FEMORAL SPONT: NORMAL
BH CV LOWER VASCULAR LEFT PERONEAL COMPRESS: NORMAL
BH CV LOWER VASCULAR LEFT POPLITEAL AUGMENT: NORMAL
BH CV LOWER VASCULAR LEFT POPLITEAL COMPETENT: NORMAL
BH CV LOWER VASCULAR LEFT POPLITEAL COMPRESS: NORMAL
BH CV LOWER VASCULAR LEFT POPLITEAL PHASIC: NORMAL
BH CV LOWER VASCULAR LEFT POPLITEAL SPONT: NORMAL
BH CV LOWER VASCULAR LEFT POSTERIOR TIBIAL COMPRESS: NORMAL
BH CV LOWER VASCULAR LEFT PROXIMAL FEMORAL COMPRESS: NORMAL
BH CV LOWER VASCULAR LEFT SAPHENOFEMORAL JUNCTION AUGMENT: NORMAL
BH CV LOWER VASCULAR LEFT SAPHENOFEMORAL JUNCTION COMPETENT: NORMAL
BH CV LOWER VASCULAR LEFT SAPHENOFEMORAL JUNCTION COMPRESS: NORMAL
BH CV LOWER VASCULAR LEFT SAPHENOFEMORAL JUNCTION PHASIC: NORMAL
BH CV LOWER VASCULAR LEFT SAPHENOFEMORAL JUNCTION SPONT: NORMAL
BH CV LOWER VASCULAR RIGHT COMMON FEMORAL AUGMENT: NORMAL
BH CV LOWER VASCULAR RIGHT COMMON FEMORAL COMPETENT: NORMAL
BH CV LOWER VASCULAR RIGHT COMMON FEMORAL COMPRESS: NORMAL
BH CV LOWER VASCULAR RIGHT COMMON FEMORAL PHASIC: NORMAL
BH CV LOWER VASCULAR RIGHT COMMON FEMORAL SPONT: NORMAL
BH CV LOWER VASCULAR RIGHT DISTAL FEMORAL COMPRESS: NORMAL
BH CV LOWER VASCULAR RIGHT GASTRONEMIUS COMPRESS: NORMAL
BH CV LOWER VASCULAR RIGHT GREATER SAPH AK COMPRESS: NORMAL
BH CV LOWER VASCULAR RIGHT GREATER SAPH BK COMPRESS: NORMAL
BH CV LOWER VASCULAR RIGHT MID FEMORAL AUGMENT: NORMAL
BH CV LOWER VASCULAR RIGHT MID FEMORAL COMPETENT: NORMAL
BH CV LOWER VASCULAR RIGHT MID FEMORAL COMPRESS: NORMAL
BH CV LOWER VASCULAR RIGHT MID FEMORAL PHASIC: NORMAL
BH CV LOWER VASCULAR RIGHT MID FEMORAL SPONT: NORMAL
BH CV LOWER VASCULAR RIGHT PERONEAL COMPRESS: NORMAL
BH CV LOWER VASCULAR RIGHT POPLITEAL AUGMENT: NORMAL
BH CV LOWER VASCULAR RIGHT POPLITEAL COMPETENT: NORMAL
BH CV LOWER VASCULAR RIGHT POPLITEAL COMPRESS: NORMAL
BH CV LOWER VASCULAR RIGHT POPLITEAL PHASIC: NORMAL
BH CV LOWER VASCULAR RIGHT POPLITEAL SPONT: NORMAL
BH CV LOWER VASCULAR RIGHT POSTERIOR TIBIAL COMPRESS: NORMAL
BH CV LOWER VASCULAR RIGHT PROXIMAL FEMORAL COMPRESS: NORMAL
BH CV LOWER VASCULAR RIGHT SAPHENOFEMORAL JUNCTION AUGMENT: NORMAL
BH CV LOWER VASCULAR RIGHT SAPHENOFEMORAL JUNCTION COMPETENT: NORMAL
BH CV LOWER VASCULAR RIGHT SAPHENOFEMORAL JUNCTION COMPRESS: NORMAL
BH CV LOWER VASCULAR RIGHT SAPHENOFEMORAL JUNCTION PHASIC: NORMAL
BH CV LOWER VASCULAR RIGHT SAPHENOFEMORAL JUNCTION SPONT: NORMAL
BILIRUB SERPL-MCNC: 0.8 MG/DL (ref 0.1–1.2)
BILIRUB UR QL STRIP: NEGATIVE
BUN BLD-MCNC: 7 MG/DL (ref 8–23)
BUN/CREAT SERPL: 7.8 (ref 7–25)
CALCIUM SPEC-SCNC: 8.5 MG/DL (ref 8.6–10.5)
CHLORIDE SERPL-SCNC: 104 MMOL/L (ref 98–107)
CLARITY UR: CLEAR
CO2 SERPL-SCNC: 25.6 MMOL/L (ref 22–29)
COLOR UR: ABNORMAL
CREAT BLD-MCNC: 0.9 MG/DL (ref 0.76–1.27)
DEPRECATED RDW RBC AUTO: 47.2 FL (ref 37–54)
EOSINOPHIL # BLD AUTO: 0.38 10*3/MM3 (ref 0–0.7)
EOSINOPHIL NFR BLD AUTO: 3 % (ref 0.3–6.2)
ERYTHROCYTE [DISTWIDTH] IN BLOOD BY AUTOMATED COUNT: 13.9 % (ref 11.5–14.5)
GFR SERPL CREATININE-BSD FRML MDRD: 83 ML/MIN/1.73
GLOBULIN UR ELPH-MCNC: 2.7 GM/DL
GLUCOSE BLD-MCNC: 98 MG/DL (ref 65–99)
GLUCOSE UR STRIP-MCNC: NEGATIVE MG/DL
HAV IGM SERPL QL IA: NORMAL
HBV CORE IGM SERPL QL IA: NORMAL
HBV SURFACE AG SERPL QL IA: NORMAL
HCT VFR BLD AUTO: 36.9 % (ref 40.4–52.2)
HCV AB SER DONR QL: NORMAL
HGB BLD-MCNC: 12.1 G/DL (ref 13.7–17.6)
HGB UR QL STRIP.AUTO: NEGATIVE
IMM GRANULOCYTES # BLD: 0.26 10*3/MM3 (ref 0–0.03)
IMM GRANULOCYTES NFR BLD: 2.1 % (ref 0–0.5)
INR PPP: 1.39 (ref 0.9–1.1)
KETONES UR QL STRIP: NEGATIVE
LEUKOCYTE ESTERASE UR QL STRIP.AUTO: NEGATIVE
LYMPHOCYTES # BLD AUTO: 1.96 10*3/MM3 (ref 0.9–4.8)
LYMPHOCYTES NFR BLD AUTO: 15.7 % (ref 19.6–45.3)
MCH RBC QN AUTO: 31 PG (ref 27–32.7)
MCHC RBC AUTO-ENTMCNC: 32.8 G/DL (ref 32.6–36.4)
MCV RBC AUTO: 94.6 FL (ref 79.8–96.2)
MONOCYTES # BLD AUTO: 1.15 10*3/MM3 (ref 0.2–1.2)
MONOCYTES NFR BLD AUTO: 9.2 % (ref 5–12)
NEUTROPHILS # BLD AUTO: 8.72 10*3/MM3 (ref 1.9–8.1)
NEUTROPHILS NFR BLD AUTO: 69.8 % (ref 42.7–76)
NITRITE UR QL STRIP: NEGATIVE
PH UR STRIP.AUTO: 5.5 [PH] (ref 5–8)
PLATELET # BLD AUTO: 266 10*3/MM3 (ref 140–500)
PMV BLD AUTO: 12.3 FL (ref 6–12)
POTASSIUM BLD-SCNC: 3.3 MMOL/L (ref 3.5–5.2)
PROT SERPL-MCNC: 5.4 G/DL (ref 6–8.5)
PROT UR QL STRIP: NEGATIVE
PROTHROMBIN TIME: 16.5 SECONDS (ref 11.7–14.2)
RBC # BLD AUTO: 3.9 10*6/MM3 (ref 4.6–6)
SODIUM BLD-SCNC: 139 MMOL/L (ref 136–145)
SP GR UR STRIP: 1.02 (ref 1–1.03)
UROBILINOGEN UR QL STRIP: ABNORMAL
WBC NRBC COR # BLD: 12.5 10*3/MM3 (ref 4.5–10.7)

## 2017-07-16 PROCEDURE — 93306 TTE W/DOPPLER COMPLETE: CPT

## 2017-07-16 PROCEDURE — 25010000002 FUROSEMIDE PER 20 MG: Performed by: INTERNAL MEDICINE

## 2017-07-16 PROCEDURE — 80053 COMPREHEN METABOLIC PANEL: CPT | Performed by: INTERNAL MEDICINE

## 2017-07-16 PROCEDURE — 93306 TTE W/DOPPLER COMPLETE: CPT | Performed by: INTERNAL MEDICINE

## 2017-07-16 PROCEDURE — 25010000002 ENOXAPARIN PER 10 MG: Performed by: INTERNAL MEDICINE

## 2017-07-16 PROCEDURE — 80074 ACUTE HEPATITIS PANEL: CPT | Performed by: INTERNAL MEDICINE

## 2017-07-16 PROCEDURE — 85610 PROTHROMBIN TIME: CPT | Performed by: INTERNAL MEDICINE

## 2017-07-16 PROCEDURE — 81003 URINALYSIS AUTO W/O SCOPE: CPT | Performed by: INTERNAL MEDICINE

## 2017-07-16 PROCEDURE — 85025 COMPLETE CBC W/AUTO DIFF WBC: CPT | Performed by: INTERNAL MEDICINE

## 2017-07-16 RX ORDER — ACETAMINOPHEN 325 MG/1
650 TABLET ORAL EVERY 6 HOURS PRN
Status: DISCONTINUED | OUTPATIENT
Start: 2017-07-16 | End: 2017-07-20 | Stop reason: HOSPADM

## 2017-07-16 RX ORDER — FUROSEMIDE 10 MG/ML
40 INJECTION INTRAMUSCULAR; INTRAVENOUS ONCE
Status: COMPLETED | OUTPATIENT
Start: 2017-07-16 | End: 2017-07-16

## 2017-07-16 RX ORDER — POTASSIUM CHLORIDE 750 MG/1
40 CAPSULE, EXTENDED RELEASE ORAL ONCE
Status: COMPLETED | OUTPATIENT
Start: 2017-07-16 | End: 2017-07-16

## 2017-07-16 RX ADMIN — CARVEDILOL 12.5 MG: 12.5 TABLET, FILM COATED ORAL at 09:58

## 2017-07-16 RX ADMIN — VANCOMYCIN 250 MG: KIT at 23:31

## 2017-07-16 RX ADMIN — VANCOMYCIN 250 MG: KIT at 04:05

## 2017-07-16 RX ADMIN — POTASSIUM CHLORIDE 40 MEQ: 750 CAPSULE, EXTENDED RELEASE ORAL at 10:00

## 2017-07-16 RX ADMIN — VANCOMYCIN 250 MG: KIT at 18:16

## 2017-07-16 RX ADMIN — ACETAMINOPHEN 650 MG: 325 TABLET ORAL at 15:31

## 2017-07-16 RX ADMIN — ENOXAPARIN SODIUM 40 MG: 40 INJECTION SUBCUTANEOUS at 09:59

## 2017-07-16 RX ADMIN — ACETAMINOPHEN 650 MG: 325 TABLET ORAL at 20:22

## 2017-07-16 RX ADMIN — FUROSEMIDE 40 MG: 10 INJECTION, SOLUTION INTRAMUSCULAR; INTRAVENOUS at 09:59

## 2017-07-16 RX ADMIN — ASPIRIN 81 MG: 81 TABLET, CHEWABLE ORAL at 09:58

## 2017-07-16 RX ADMIN — VANCOMYCIN 250 MG: KIT at 11:07

## 2017-07-16 NOTE — PLAN OF CARE
Problem: Fluid Volume Deficit (Adult)  Goal: Identify Related Risk Factors and Signs and Symptoms  Outcome: Outcome(s) achieved Date Met:  07/16/17  Goal: Fluid/Electrolyte Balance  Outcome: Ongoing (interventions implemented as appropriate)  Goal: Comfort/Well Being  Outcome: Ongoing (interventions implemented as appropriate)    Problem: Diarrhea (Adult)  Goal: Identify Related Risk Factors and Signs and Symptoms  Outcome: Outcome(s) achieved Date Met:  07/16/17  Goal: Improved/Reduced Symptoms  Outcome: Ongoing (interventions implemented as appropriate)    Problem: Fall Risk (Adult)  Goal: Identify Related Risk Factors and Signs and Symptoms  Outcome: Outcome(s) achieved Date Met:  07/16/17  Goal: Absence of Falls  Outcome: Ongoing (interventions implemented as appropriate)

## 2017-07-16 NOTE — PLAN OF CARE
Problem: Patient Care Overview (Adult)  Goal: Plan of Care Review  Outcome: Ongoing (interventions implemented as appropriate)    07/16/17 0536   Coping/Psychosocial Response Interventions   Plan Of Care Reviewed With patient   Patient Care Overview   Progress no change   Outcome Evaluation   Outcome Summary/Follow up Plan Pt admitted to floor from ER, no c/o pain or nausea, bilateral legs have edema, pt states they feel tight, no loose stools my shift, pt did void once with dark brittany urine, will cont. to monitor

## 2017-07-16 NOTE — PROGRESS NOTES
Name: Demetris Nye ADMIT: 7/15/2017   : 1945  PCP: Rian Morfin MD    MRN: [id number] LOS: 1 days   AGE/SEX: 71 y.o. male  ROOM: [facility]   Subjective      Still with LE edema, not present on discharge on   No soa or chest pain  Sleeps on 2 pillows for comfort and breathing  No diarrhea  Poor appetite still but ate more today  No fevers or chills, white blood cell count improved    Subjective    Objective   Vital Signs  Temp:  [97 °F (36.1 °C)-99.1 °F (37.3 °C)] 98 °F (36.7 °C)  Heart Rate:  [59-76] 63  Resp:  [18-20] 18  BP: (138-158)/(76-84) 158/82  SpO2:  [96 %-98 %] 96 %  on   ;   O2 Device: room air  Body mass index is 35.26 kg/(m^2).    Physical Exam   Constitutional: He is oriented to person, place, and time. He appears well-developed and well-nourished. No distress.   HENT:   Head: Normocephalic and atraumatic.   Eyes: Pupils are equal, round, and reactive to light. No scleral icterus.   Neck: No JVD present.   Cardiovascular: Normal rate and regular rhythm.    No murmur heard.  Pulmonary/Chest: Effort normal and breath sounds normal. No stridor. No respiratory distress. He has no wheezes. He has no rales.   Abdominal: Soft. Bowel sounds are normal. He exhibits no distension (Obese). There is no rebound and no guarding.   Musculoskeletal: He exhibits edema (2+ lower extremity edema). He exhibits no tenderness.   Neurological: He is alert and oriented to person, place, and time.   Skin: Skin is warm and dry. Rash noted. He is not diaphoretic. No erythema.   Psychiatric: He has a normal mood and affect. His behavior is normal.   Nursing note and vitals reviewed.      Results Review:       I reviewed the patient's new clinical results.    Results from last 7 days  Lab Units 17  0457 07/15/17  1737 17  0340 17  0351 17  0430 17  0419 07/10/17  0411   WBC 10*3/mm3 12.50* 14.16* 19.15* 18.63* 22.32* 24.81* 28.33*   HEMOGLOBIN g/dL 12.1* 12.4* 12.6* 12.6*  13.0* 12.8* 12.7*   PLATELETS 10*3/mm3 266 277 279 284 271 255 231     Results from last 7 days  Lab Units 07/16/17  0457 07/15/17  1737 07/14/17  0340 07/13/17  1652 07/13/17  0351 07/12/17 2006 07/12/17  0430 07/11/17  0419 07/10/17  0411   SODIUM mmol/L 139 142 143  --  139  --  140 137 138   POTASSIUM mmol/L 3.3* 3.5 3.7 3.9 3.6 4.1 3.2* 3.6 4.1   CHLORIDE mmol/L 104 107 112*  --  105  --  106 103 100   CO2 mmol/L 25.6 24.4 19.5*  --  21.0*  --  21.1* 19.9* 23.2   BUN mg/dL 7* 8 9  --  15  --  32* 55* 87*   CREATININE mg/dL 0.90 0.83 0.71*  --  0.88  --  0.98 1.18 1.58*   GLUCOSE mg/dL 98 136* 101*  --  106*  --  114* 108* 133*   Estimated Creatinine Clearance: 101.3 mL/min (by C-G formula based on Cr of 0.9).  Results from last 7 days  Lab Units 07/16/17  0457 07/15/17  1737 07/14/17  0340 07/13/17  0351 07/12/17  0430 07/11/17  0419 07/10/17  0411   CALCIUM mg/dL 8.5* 8.6 8.2* 7.8* 8.0* 8.1* 8.6   ALBUMIN g/dL 2.70* 2.80*  --   --   --   --   --          aspirin 81 mg Oral Daily   carvedilol 12.5 mg Oral Daily   enoxaparin 40 mg Subcutaneous Daily   furosemide 40 mg Intravenous Once   vancomycin 250 mg Oral Q6H      Diet Regular      Assessment/Plan   Assessment:     Active Hospital Problems (** Indicates Principal Problem)    Diagnosis Date Noted   • **Bilateral edema of lower extremity [R60.0] 07/15/2017   • C. difficile colitis [A04.7] 07/10/2017   • HTN (hypertension) [I10] 06/30/2017      Resolved Hospital Problems    Diagnosis Date Noted Date Resolved   No resolved problems to display.       Plan:        LE Edema  - unclear cause. Albumin has dropped from 4 during prior admission to 2.7 but no proteinuria and Liver U/S essentially normal  -BNP is normal but since not renal and not liver will check 2D echo  -LE dopplers preliminary negative, follow results  -lasix IV x 1 and follow response       Elevated AST/ALT  -improving  - were normal on 7/1. ProbablyDrug induced,  Holding statin  -improved  - INR  is also slightly elevated  -Abd ultrasound with normal liver morphology  - hep panel and tylenol level normal  - monitor INR and AST/ALT daily     CDiff  - WBC is trending down  - will cont oral vanc, needs another 8 days (end on 7/24)    Right hip fracture: repalced last hospitalization, consult PT while here    Lovenox for PPx    Replace K    Disposition  TBD.      Manuelito Abrams MD  [facility]  07/16/17  9:20 AM

## 2017-07-17 LAB
ALBUMIN SERPL-MCNC: 2.8 G/DL (ref 3.5–5.2)
ALBUMIN/GLOB SERPL: 1 G/DL
ALP SERPL-CCNC: 82 U/L (ref 39–117)
ALT SERPL W P-5'-P-CCNC: 114 U/L (ref 1–41)
ANION GAP SERPL CALCULATED.3IONS-SCNC: 11.5 MMOL/L
AST SERPL-CCNC: 76 U/L (ref 1–40)
BASOPHILS # BLD AUTO: 0.05 10*3/MM3 (ref 0–0.2)
BASOPHILS NFR BLD AUTO: 0.4 % (ref 0–1.5)
BH CV ECHO MEAS - ACS: 2.3 CM
BH CV ECHO MEAS - AO MEAN PG (FULL): 3 MMHG
BH CV ECHO MEAS - AO MEAN PG: 8 MMHG
BH CV ECHO MEAS - AO ROOT AREA (BSA CORRECTED): 1.5
BH CV ECHO MEAS - AO ROOT AREA: 10.2 CM^2
BH CV ECHO MEAS - AO ROOT DIAM: 3.6 CM
BH CV ECHO MEAS - AO V2 MAX: 210 CM/SEC
BH CV ECHO MEAS - AO V2 MEAN: 135 CM/SEC
BH CV ECHO MEAS - AO V2 VTI: 38.8 CM
BH CV ECHO MEAS - ASC AORTA: 3.5 CM
BH CV ECHO MEAS - AVA(I,A): 3.1 CM^2
BH CV ECHO MEAS - AVA(I,D): 3.1 CM^2
BH CV ECHO MEAS - BSA(HAYCOCK): 2.5 M^2
BH CV ECHO MEAS - BSA: 2.4 M^2
BH CV ECHO MEAS - BZI_BMI: 35.3 KILOGRAMS/M^2
BH CV ECHO MEAS - BZI_METRIC_HEIGHT: 182.9 CM
BH CV ECHO MEAS - BZI_METRIC_WEIGHT: 117.9 KG
BH CV ECHO MEAS - CONTRAST EF (2CH): 65.3 ML/M^2
BH CV ECHO MEAS - CONTRAST EF 4CH: 60.5 ML/M^2
BH CV ECHO MEAS - EDV(CUBED): 97.3 ML
BH CV ECHO MEAS - EDV(MOD-SP2): 118 ML
BH CV ECHO MEAS - EDV(MOD-SP4): 129 ML
BH CV ECHO MEAS - EDV(TEICH): 97.3 ML
BH CV ECHO MEAS - EF(CUBED): 77.4 %
BH CV ECHO MEAS - EF(MOD-SP2): 65.3 %
BH CV ECHO MEAS - EF(MOD-SP4): 60.5 %
BH CV ECHO MEAS - EF(TEICH): 69.6 %
BH CV ECHO MEAS - ESV(CUBED): 22 ML
BH CV ECHO MEAS - ESV(MOD-SP2): 41 ML
BH CV ECHO MEAS - ESV(MOD-SP4): 51 ML
BH CV ECHO MEAS - ESV(TEICH): 29.6 ML
BH CV ECHO MEAS - FS: 39.1 %
BH CV ECHO MEAS - IVS/LVPW: 0.92
BH CV ECHO MEAS - IVSD: 1.2 CM
BH CV ECHO MEAS - LAT PEAK E' VEL: 12 CM/SEC
BH CV ECHO MEAS - LV DIASTOLIC VOL/BSA (35-75): 54.2 ML/M^2
BH CV ECHO MEAS - LV MASS(C)D: 217.4 GRAMS
BH CV ECHO MEAS - LV MASS(C)DI: 91.3 GRAMS/M^2
BH CV ECHO MEAS - LV MEAN PG: 5 MMHG
BH CV ECHO MEAS - LV SYSTOLIC VOL/BSA (12-30): 21.4 ML/M^2
BH CV ECHO MEAS - LV V1 MAX: 151 CM/SEC
BH CV ECHO MEAS - LV V1 MEAN: 103 CM/SEC
BH CV ECHO MEAS - LV V1 VTI: 31.6 CM
BH CV ECHO MEAS - LVIDD: 4.6 CM
BH CV ECHO MEAS - LVIDS: 2.8 CM
BH CV ECHO MEAS - LVLD AP2: 8.5 CM
BH CV ECHO MEAS - LVLD AP4: 8.9 CM
BH CV ECHO MEAS - LVLS AP2: 7.3 CM
BH CV ECHO MEAS - LVLS AP4: 7.6 CM
BH CV ECHO MEAS - LVOT AREA (M): 3.8 CM^2
BH CV ECHO MEAS - LVOT AREA: 3.8 CM^2
BH CV ECHO MEAS - LVOT DIAM: 2.2 CM
BH CV ECHO MEAS - LVPWD: 1.3 CM
BH CV ECHO MEAS - MED PEAK E' VEL: 9 CM/SEC
BH CV ECHO MEAS - MR MAX PG: 57.8 MMHG
BH CV ECHO MEAS - MR MAX VEL: 380 CM/SEC
BH CV ECHO MEAS - MV A DUR: 204 SEC
BH CV ECHO MEAS - MV A MAX VEL: 88.8 CM/SEC
BH CV ECHO MEAS - MV DEC SLOPE: 305 CM/SEC^2
BH CV ECHO MEAS - MV DEC TIME: 229 SEC
BH CV ECHO MEAS - MV E MAX VEL: 89.8 CM/SEC
BH CV ECHO MEAS - MV E/A: 1
BH CV ECHO MEAS - MV MEAN PG: 2 MMHG
BH CV ECHO MEAS - MV P1/2T MAX VEL: 111 CM/SEC
BH CV ECHO MEAS - MV P1/2T: 106.6 MSEC
BH CV ECHO MEAS - MV V2 MEAN: 58.6 CM/SEC
BH CV ECHO MEAS - MV V2 VTI: 43.5 CM
BH CV ECHO MEAS - MVA P1/2T LCG: 2 CM^2
BH CV ECHO MEAS - MVA(P1/2T): 2.1 CM^2
BH CV ECHO MEAS - MVA(VTI): 2.8 CM^2
BH CV ECHO MEAS - PA ACC SLOPE: 12.4 CM/SEC^2
BH CV ECHO MEAS - PA ACC TIME: 0.11 SEC
BH CV ECHO MEAS - PA MAX PG (FULL): 2.6 MMHG
BH CV ECHO MEAS - PA MAX PG: 5.9 MMHG
BH CV ECHO MEAS - PA PR(ACCEL): 30 MMHG
BH CV ECHO MEAS - PA V2 MAX: 121 CM/SEC
BH CV ECHO MEAS - PULM A REVS DUR: 99 SEC
BH CV ECHO MEAS - PULM A REVS VEL: 33.3 CM/SEC
BH CV ECHO MEAS - PULM DIAS VEL: 52.4 CM/SEC
BH CV ECHO MEAS - PULM S/D: 1.3
BH CV ECHO MEAS - PULM SYS VEL: 66.2 CM/SEC
BH CV ECHO MEAS - PVA(V,A): 3.7 CM^2
BH CV ECHO MEAS - PVA(V,D): 3.7 CM^2
BH CV ECHO MEAS - QP/QS: 0.91
BH CV ECHO MEAS - RAP SYSTOLE: 3 MMHG
BH CV ECHO MEAS - RV MAX PG: 3.3 MMHG
BH CV ECHO MEAS - RV MEAN PG: 2 MMHG
BH CV ECHO MEAS - RV V1 MAX: 90.2 CM/SEC
BH CV ECHO MEAS - RV V1 MEAN: 60.1 CM/SEC
BH CV ECHO MEAS - RV V1 VTI: 22.2 CM
BH CV ECHO MEAS - RVOT AREA: 4.9 CM^2
BH CV ECHO MEAS - RVOT DIAM: 2.5 CM
BH CV ECHO MEAS - RVSP: 33 MMHG
BH CV ECHO MEAS - SI(AO): 165.8 ML/M^2
BH CV ECHO MEAS - SI(CUBED): 31.7 ML/M^2
BH CV ECHO MEAS - SI(LVOT): 50.4 ML/M^2
BH CV ECHO MEAS - SI(MOD-SP2): 32.3 ML/M^2
BH CV ECHO MEAS - SI(MOD-SP4): 32.7 ML/M^2
BH CV ECHO MEAS - SI(TEICH): 28.5 ML/M^2
BH CV ECHO MEAS - SV(AO): 394.9 ML
BH CV ECHO MEAS - SV(CUBED): 75.4 ML
BH CV ECHO MEAS - SV(LVOT): 120.1 ML
BH CV ECHO MEAS - SV(MOD-SP2): 77 ML
BH CV ECHO MEAS - SV(MOD-SP4): 78 ML
BH CV ECHO MEAS - SV(RVOT): 109 ML
BH CV ECHO MEAS - SV(TEICH): 67.8 ML
BH CV ECHO MEAS - TAPSE (>1.6): 1.6 CM2
BH CV ECHO MEAS - TR MAX VEL: 273 CM/SEC
BH CV XLRA - RV BASE: 4 CM
BH CV XLRA - TDI S': 15 CM/SEC
BILIRUB SERPL-MCNC: 0.7 MG/DL (ref 0.1–1.2)
BUN BLD-MCNC: 7 MG/DL (ref 8–23)
BUN/CREAT SERPL: 8.4 (ref 7–25)
CALCIUM SPEC-SCNC: 8.4 MG/DL (ref 8.6–10.5)
CHLORIDE SERPL-SCNC: 100 MMOL/L (ref 98–107)
CO2 SERPL-SCNC: 25.5 MMOL/L (ref 22–29)
CREAT BLD-MCNC: 0.83 MG/DL (ref 0.76–1.27)
DEPRECATED RDW RBC AUTO: 47.6 FL (ref 37–54)
E/E' RATIO: 9
EOSINOPHIL # BLD AUTO: 0.28 10*3/MM3 (ref 0–0.7)
EOSINOPHIL NFR BLD AUTO: 2.5 % (ref 0.3–6.2)
ERYTHROCYTE [DISTWIDTH] IN BLOOD BY AUTOMATED COUNT: 13.8 % (ref 11.5–14.5)
GFR SERPL CREATININE-BSD FRML MDRD: 92 ML/MIN/1.73
GLOBULIN UR ELPH-MCNC: 2.9 GM/DL
GLUCOSE BLD-MCNC: 138 MG/DL (ref 65–99)
HCT VFR BLD AUTO: 38.2 % (ref 40.4–52.2)
HGB BLD-MCNC: 12.7 G/DL (ref 13.7–17.6)
IMM GRANULOCYTES # BLD: 0.12 10*3/MM3 (ref 0–0.03)
IMM GRANULOCYTES NFR BLD: 1.1 % (ref 0–0.5)
LEFT ATRIUM VOLUME INDEX: 33.6 ML/M2
LYMPHOCYTES # BLD AUTO: 1.41 10*3/MM3 (ref 0.9–4.8)
LYMPHOCYTES NFR BLD AUTO: 12.7 % (ref 19.6–45.3)
MCH RBC QN AUTO: 31.6 PG (ref 27–32.7)
MCHC RBC AUTO-ENTMCNC: 33.2 G/DL (ref 32.6–36.4)
MCV RBC AUTO: 95 FL (ref 79.8–96.2)
MONOCYTES # BLD AUTO: 0.64 10*3/MM3 (ref 0.2–1.2)
MONOCYTES NFR BLD AUTO: 5.8 % (ref 5–12)
NEUTROPHILS # BLD AUTO: 8.63 10*3/MM3 (ref 1.9–8.1)
NEUTROPHILS NFR BLD AUTO: 77.5 % (ref 42.7–76)
PLATELET # BLD AUTO: 261 10*3/MM3 (ref 140–500)
PMV BLD AUTO: 12 FL (ref 6–12)
POTASSIUM BLD-SCNC: 3.4 MMOL/L (ref 3.5–5.2)
PROT SERPL-MCNC: 5.7 G/DL (ref 6–8.5)
RBC # BLD AUTO: 4.02 10*6/MM3 (ref 4.6–6)
SODIUM BLD-SCNC: 137 MMOL/L (ref 136–145)
WBC NRBC COR # BLD: 11.13 10*3/MM3 (ref 4.5–10.7)

## 2017-07-17 PROCEDURE — 80053 COMPREHEN METABOLIC PANEL: CPT | Performed by: INTERNAL MEDICINE

## 2017-07-17 PROCEDURE — 85025 COMPLETE CBC W/AUTO DIFF WBC: CPT | Performed by: INTERNAL MEDICINE

## 2017-07-17 PROCEDURE — 25010000002 ENOXAPARIN PER 10 MG: Performed by: INTERNAL MEDICINE

## 2017-07-17 PROCEDURE — 97162 PT EVAL MOD COMPLEX 30 MIN: CPT

## 2017-07-17 PROCEDURE — 97110 THERAPEUTIC EXERCISES: CPT

## 2017-07-17 RX ORDER — FUROSEMIDE 40 MG/1
40 TABLET ORAL DAILY
Status: DISCONTINUED | OUTPATIENT
Start: 2017-07-17 | End: 2017-07-20 | Stop reason: HOSPADM

## 2017-07-17 RX ORDER — POTASSIUM CHLORIDE 750 MG/1
40 CAPSULE, EXTENDED RELEASE ORAL ONCE
Status: COMPLETED | OUTPATIENT
Start: 2017-07-17 | End: 2017-07-17

## 2017-07-17 RX ADMIN — ENOXAPARIN SODIUM 40 MG: 40 INJECTION SUBCUTANEOUS at 09:07

## 2017-07-17 RX ADMIN — POTASSIUM CHLORIDE 40 MEQ: 750 CAPSULE, EXTENDED RELEASE ORAL at 14:02

## 2017-07-17 RX ADMIN — VANCOMYCIN 250 MG: KIT at 18:03

## 2017-07-17 RX ADMIN — VANCOMYCIN 250 MG: KIT at 06:12

## 2017-07-17 RX ADMIN — ASPIRIN 81 MG: 81 TABLET, CHEWABLE ORAL at 09:07

## 2017-07-17 RX ADMIN — VANCOMYCIN 250 MG: KIT at 12:02

## 2017-07-17 RX ADMIN — ACETAMINOPHEN 650 MG: 325 TABLET ORAL at 18:06

## 2017-07-17 RX ADMIN — CARVEDILOL 12.5 MG: 12.5 TABLET, FILM COATED ORAL at 09:07

## 2017-07-17 RX ADMIN — FUROSEMIDE 40 MG: 40 TABLET ORAL at 12:03

## 2017-07-17 NOTE — THERAPY EVALUATION
Acute Care - Physical Therapy Initial Evaluation   [place]     Patient Name: Demetris Nye  : 1945  MRN: [id number]  Today's Date: 2017   Onset of Illness/Injury or Date of Surgery Date: 07/15/17            Admit Date: 7/15/2017     Visit Dx:    ICD-10-CM ICD-9-CM   1. Bilateral edema of lower extremity R60.0 782.3   2. C. difficile diarrhea A04.7 008.45   3. Hypoalbuminemia E88.09 273.8   4. Blister of right leg, initial encounter S80.821A 916.2   5. Difficulty walking R26.2 719.7     Patient Active Problem List   Diagnosis   • Fall   • Closed right hip fracture   • HTN (hypertension)   • PVC (premature ventricular contraction)   • RUDY   • Hyponatremia   • Ileus, postoperative   • Colitis   • C. difficile colitis   • Bilateral edema of lower extremity     Past Medical History:   Diagnosis Date   • Arthritis    • Hyperlipidemia    • Hypertension    • Irregular cardiac rhythm      Past Surgical History:   Procedure Laterality Date   • PA OPEN FIX INTER/SUBTROCH FX,IMPLNT Right 2017    Procedure: RIGHT INTRAMEDULLARY NAILING/RODDING;  Surgeon: Manuelito Alba MD;  Location: [facility] OR;  Service: Orthopedics          PT ASSESSMENT (last 72 hours)      PT Evaluation       17 1530 17 0224    Rehab Evaluation    Document Type evaluation  -DM     Subjective Information no complaints;agree to therapy  -DM     Patient Effort, Rehab Treatment good  -DM     Symptoms Noted During/After Treatment none  -DM     General Information    Patient Profile Review yes  -DM     Onset of Illness/Injury or Date of Surgery Date 07/15/17  -DM     Pertinent History Of Current Problem recent R hip ORIF/C-diff>home & back next day w/LE edema   -DM     Precautions/Limitations fall precautions  -DM     Prior Level of Function independent:   prior to hip sx  -DM     Equipment Currently Used at Home walker, rolling  -DM walker, rolling  -MI    Plans/Goals Discussed With patient;agreed upon  -DM     Clinical  Impression    Functional Level At Time Of Evaluation CGA/Min A  -DM     Patient/Family Goals Statement PLOF  -DM     Criteria for Skilled Therapeutic Interventions Met yes;treatment indicated  -DM     Pathology/Pathophysiology Noted (Describe Specifically for Each System) musculoskeletal  -DM     Impairments Found (describe specific impairments) gait, locomotion, and balance  -DM     Functional Limitations in Following Categories (Describe Specific Limitations) self-care;home management;work;community/leisure  -DM     Rehab Potential good, to achieve stated therapy goals  -DM     Predicted Duration of Therapy Intervention (days/wks) 2 weeks  -DM     Pain Assessment    Pain Assessment 0-10  -DM     Pain Score 4  -DM     Pain Location Hip  -DM     Pain Orientation Right  -DM     Pain Intervention(s) Repositioned;Ambulation/increased activity;Elevated  -DM     Vision Assessment/Intervention    Visual Impairment WFL with corrective lenses  -DM     Cognitive Assessment/Intervention    Current Cognitive/Communication Assessment functional  -DM     Orientation Status oriented x 4  -DM     Follows Commands/Answers Questions 100% of the time  -DM     Personal Safety mild impairment;one on one supervision required for safety  -DM     Personal Safety Interventions fall prevention program maintained;gait belt;muscle strengthening facilitated;nonskid shoes/slippers when out of bed;supervised activity  -DM     ROM (Range of Motion)    General ROM no range of motion deficits identified  -DM     MMT (Manual Muscle Testing)    General MMT Assessment no strength deficits identified  -DM     General MMT Assessment Detail generalized weakness noted with mobility  -DM     Bed Mobility, Assessment/Treatment    Bed Mobility, Assistive Device bed rails  -DM     Bed Mob, Supine to Sit, [place] contact guard assist  -DM     Bed Mob, Sit to Supine, [place] not tested  -DM     Bed Mobility, Impairments strength decreased;impaired  balance  -DM     Transfer Assessment/Treatment    Transfers, Sit-Stand [place] contact guard assist;verbal cues required  -DM     Transfers, Stand-Sit [place] contact guard assist;verbal cues required  -DM     Transfers, Sit-Stand-Sit, Assist Device rolling walker  -DM     Transfer, Safety Issues balance decreased during turns;step length decreased  -DM     Transfer, Impairments impaired balance  -DM     Transfer, Comment cues for hand placement  -DM     Gait Assessment/Treatment    Gait, [place] Level contact guard assist;2 person assist required;verbal cues required  -DM     Gait, Assistive Device rolling walker  -DM     Gait, Distance (Feet) 15  -DM     Gait, Gait Deviations [place] decreased;decreased heel strike;step length decreased;toe-to-floor clearance decreased;other (see comments)   cues to not get so close to RW; cues for sequencing  -DM     Gait, Safety Issues balance decreased during turns;step length decreased;sequencing ability decreased  -DM     Gait, Impairments strength decreased  -DM     Gait, Comment limited endurance/activity tolerance  -DM     Motor Skills/Interventions    Additional Documentation Balance Skills Training (Group)  -DM     Balance Skills Training    Sitting-Level of Assistance Distant supervision  -DM     Sitting-Balance Support Feet supported  -DM     Standing-Level of Assistance Contact guard;x2  -DM     Static Standing Balance Support assistive device  -DM     Gait Balance-Level of Assistance Contact guard;x2  -DM     Gait Balance Support assistive device  -DM     Therapy Exercises    Exercise Protocols hip ORIF  -DM     Hip ORIF Exercises right:;10 reps;completed protocol;with assist  -DM     Positioning and Restraints    Pre-Treatment Position in bed  -DM     Post Treatment Position chair  -DM     In Chair notified nsg;reclined;call light within reach;encouraged to call for assist;legs elevated  -DM       07/15/17 7807       General Information    Equipment  Currently Used at Home walker, rolling  -LG       User Key  (r) = Recorded By, (t) = Taken By, (c) = Cosigned By    Initials Name Provider Type    DM Lori Barbosa, PT Physical Therapist    LG Lyla Galvin, RN Registered Nurse    GIO Nunez, RN Registered Nurse          Physical Therapy Education     Title: PT OT SLP Therapies (Done)     Topic: Physical Therapy (Done)     Point: Mobility training (Done)    Learning Progress Summary    Learner Readiness Method Response Comment Documented by Status   Patient Acceptance E,TB,D DU,NR  DM 07/17/17 1558 Done               Point: Home exercise program (Done)    Learning Progress Summary    Learner Readiness Method Response Comment Documented by Status   Patient Acceptance E,TB,D DU,NR  DM 07/17/17 1558 Done               Point: Body mechanics (Done)    Learning Progress Summary    Learner Readiness Method Response Comment Documented by Status   Patient Acceptance E,TB,D DU,NR  DM 07/17/17 1558 Done               Point: Precautions (Done)    Learning Progress Summary    Learner Readiness Method Response Comment Documented by Status   Patient Acceptance E,TB,D DU,NR  DM 07/17/17 1558 Done                      User Key     Initials Effective Dates Name Provider Type Discipline     10/06/15 -  Lori Barbosa, PT Physical Therapist PT                PT Recommendation and Plan  Anticipated Discharge Disposition: skilled nursing facility  Planned Therapy Interventions: balance training, bed mobility training, gait training, home exercise program, patient/family education, strengthening, transfer training  PT Frequency: daily  Plan of Care Review  Plan Of Care Reviewed With: patient  Progress: progress toward functional goals as expected  Outcome Summary/Follow up Plan: Pt admitted with B LE edema/hypoalbuminemia one day after discharge from [facility] after ORIF R hip (fall>hip fx)) and C-diff; presents with generalized weakness, decreased knowledge of safe walker use,  decreased activity tolerance and currently requires CG/Min A for mobility; recommending SNF rehab upon hospital discharge as 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 Will benefit from continued skilled PT to advance mobility for PLOF.           IP PT Goals       07/17/17 1558          Bed Mobility PT LTG    Bed Mobility PT LTG, Date Established 07/17/17  -DM      Bed Mobility PT LTG, Time to Achieve 1 wk  -DM      Bed Mobility PT LTG, Activity Type all bed mobility  -DM      Bed Mobility PT LTG, [place] Level independent  -DM      Transfer Training PT LTG    Transfer Training PT LTG, Date Established 07/17/17  -DM      Transfer Training PT LTG, Time to Achieve 1 wk  -DM      Transfer Training PT LTG, Activity Type all transfers  -DM      Transfer Training PT LTG, [place] Level independent  -DM      Transfer Training PT LTG, Assist Device walker, rolling  -DM      Gait Training PT LTG    Gait Training Goal PT LTG, Date Established 07/17/17  -DM      Gait Training Goal PT LTG, Time to Achieve 1 wk  -DM      Gait Training Goal PT LTG, [place] Level independent  -DM      Gait Training Goal PT LTG, Assist Device walker, rolling  -DM      Gait Training Goal PT LTG, Distance to Achieve 150  -DM        User Key  (r) = Recorded By, (t) = Taken By, (c) = Cosigned By    Initials Name Provider Type    KASANDRA Barbosa PT Physical Therapist                Outcome Measures       07/17/17 1600          How much help from another person do you currently need...    Turning from your back to your side while in flat bed without using bedrails? 3  -DM      Moving from lying on back to sitting on the side of a flat bed without bedrails? 3  -DM      Moving to and from a bed to a chair (including a wheelchair)? 3  -DM      Standing up from a chair using your arms (e.g., wheelchair, bedside chair)? 3  -DM      Climbing 3-5 steps with a railing? 2  -DM      To walk in hospital room? 3  -DM      AM-PAC 6 Clicks Score 17  -DM       Functional Assessment    Outcome Measure Options AM-PAC 6 Clicks Basic Mobility (PT)  -DM        User Key  (r) = Recorded By, (t) = Taken By, (c) = Cosigned By    Initials Name Provider Type    KASANDRA Barbosa PT Physical Therapist           Time Calculation:         PT Charges       07/17/17 1553          Time Calculation    Start Time 1530  -DM      Stop Time 1553  -DM      Time Calculation (min) 23 min  -DM      PT Received On 07/17/17  -DM      PT - Next Appointment 07/18/17  -DM      PT Goal Re-Cert Due Date 07/24/17  -DM        User Key  (r) = Recorded By, (t) = Taken By, (c) = Cosigned By    Initials Name Provider Type    KASANDRA Barbosa PT Physical Therapist          Therapy Charges for Today     Code Description Service Date Service Provider Modifiers Qty    02177216175 HC PT EVAL MOD COMPLEXITY 2 7/17/2017 Lori Barbosa, PT GP 1    50893599509 HC PT THER SUPP EA 15 MIN 7/17/2017 Lori Barbosa, PT GP 2    94235033751 HC PT THER PROC EA 15 MIN 7/17/2017 Lori Barbosa, PT GP 2          PT G-Codes  Outcome Measure Options: AM-PAC 6 Clicks Basic Mobility (PT)      Lori Barbosa, PT  7/17/2017

## 2017-07-17 NOTE — PLAN OF CARE
Problem: Patient Care Overview (Adult)  Goal: Plan of Care Review  Outcome: Ongoing (interventions implemented as appropriate)    07/17/17 1345   Coping/Psychosocial Response Interventions   Plan Of Care Reviewed With patient   Patient Care Overview   Progress improving   Outcome Evaluation   Outcome Summary/Follow up Plan No stools this shift. Denies need for pain med. Diurectics stared today Lasix po. Pt instructed on increased urine outpu with med administration. INstructed on BP meds and mmonitoring.          Problem: Fluid Volume Deficit (Adult)  Goal: Fluid/Electrolyte Balance  Outcome: Ongoing (interventions implemented as appropriate)    Problem: Diarrhea (Adult)  Goal: Improved/Reduced Symptoms  Outcome: Ongoing (interventions implemented as appropriate)    Problem: Fall Risk (Adult)  Goal: Absence of Falls  Outcome: Ongoing (interventions implemented as appropriate)    Problem: Pressure Ulcer (Adult)  Goal: Signs and Symptoms of Listed Potential Problems Will be Absent or Manageable (Pressure Ulcer)  Outcome: Ongoing (interventions implemented as appropriate)

## 2017-07-17 NOTE — PLAN OF CARE
Problem: Patient Care Overview (Adult)  Goal: Plan of Care Review  Outcome: Ongoing (interventions implemented as appropriate)    07/17/17 1558   Coping/Psychosocial Response Interventions   Plan Of Care Reviewed With patient   Patient Care Overview   Progress progress toward functional goals as expected   Outcome Evaluation   Outcome Summary/Follow up Plan Pt admitted with B LE edema/hypoalbuminemia one day after discharge from [facility] after ORIF R hip (fall>hip fx)) and C-diff; presents with generalized weakness, decreased knowledge of safe walker use, decreased activity tolerance and currently requires CG/Min A for mobility; recommending SNF rehab upon hospital discharge as 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 Will benefit from continued skilled PT to advance mobility for PLOF.          Problem: Inpatient Physical Therapy  Goal: Bed Mobility Goal LTG- PT  Outcome: Ongoing (interventions implemented as appropriate)    07/17/17 1558   Bed Mobility PT LTG   Bed Mobility PT LTG, Date Established 07/17/17   Bed Mobility PT LTG, Time to Achieve 1 wk   Bed Mobility PT LTG, Activity Type all bed mobility   Bed Mobility PT LTG, [place] Level independent       Goal: Transfer Training Goal 1 LTG- PT  Outcome: Ongoing (interventions implemented as appropriate)    07/17/17 1558   Transfer Training PT LTG   Transfer Training PT LTG, Date Established 07/17/17   Transfer Training PT LTG, Time to Achieve 1 wk   Transfer Training PT LTG, Activity Type all transfers   Transfer Training PT LTG, [place] Level independent   Transfer Training PT LTG, Assist Device walker, rolling       Goal: Gait Training Goal LTG- PT  Outcome: Ongoing (interventions implemented as appropriate)    07/17/17 1558   Gait Training PT LTG   Gait Training Goal PT LTG, Date Established 07/17/17   Gait Training Goal PT LTG, Time to Achieve 1 wk   Gait Training Goal PT LTG, [place] Level independent   Gait Training Goal PT LTG, Assist Device  walker, rolling   Gait Training Goal PT LTG, Distance to Achieve 150

## 2017-07-17 NOTE — PLAN OF CARE
Problem: Patient Care Overview (Adult)  Goal: Plan of Care Review  Outcome: Ongoing (interventions implemented as appropriate)    07/17/17 0224   Coping/Psychosocial Response Interventions   Plan Of Care Reviewed With patient   Patient Care Overview   Progress improving   Outcome Evaluation   Outcome Summary/Follow up Plan Patient has no loose stool tonight, brief in place per wife's request. Contact isolation maintained. Took Tylenol for headache, relief achieved. Educated patient on the impotance of BP monitoring for history of Hypertension.        Goal: Discharge Needs Assessment  Outcome: Ongoing (interventions implemented as appropriate)    Problem: Diarrhea (Adult)  Goal: Improved/Reduced Symptoms  Outcome: Ongoing (interventions implemented as appropriate)    Problem: Fall Risk (Adult)  Goal: Absence of Falls  Outcome: Ongoing (interventions implemented as appropriate)

## 2017-07-17 NOTE — PROGRESS NOTES
Name: Demetris Nye ADMIT: 7/15/2017   : 1945  PCP: Rian Morfin MD    MRN: [id number] LOS: 2 days   AGE/SEX: 71 y.o. male  [facility]   Subjective      Still with LE edema, not present on discharge on   No soa or chest pain  Sleeps on 2 pillows for comfort and breathing  No diarrhea  Poor appetite still but ate more today  No fevers or chills, white blood cell count improved    Subjective    Objective   Vital Signs  Temp:  [96.7 °F (35.9 °C)-97.7 °F (36.5 °C)] 97.4 °F (36.3 °C)  Heart Rate:  [54-68] 65  Resp:  [18-20] 18  BP: (125-144)/(77-82) 140/80  SpO2:  [95 %-98 %] 95 %  on   ;   O2 Device: room air  Body mass index is 35.26 kg/(m^2).    Physical Exam   Constitutional: He is oriented to person, place, and time. He appears well-developed and well-nourished. No distress.   HENT:   Head: Normocephalic and atraumatic.   Eyes: Pupils are equal, round, and reactive to light. No scleral icterus.   Neck: No JVD present.   Cardiovascular: Normal rate and regular rhythm.    No murmur heard.  Pulmonary/Chest: Effort normal and breath sounds normal. No stridor. No respiratory distress. He has no wheezes. He has no rales.   Abdominal: Soft. Bowel sounds are normal. He exhibits no distension (Obese). There is no rebound and no guarding.   Musculoskeletal: He exhibits edema (2+ lower extremity edema). He exhibits no tenderness.   Neurological: He is alert and oriented to person, place, and time.   Skin: Skin is warm and dry. No rash noted. He is not diaphoretic. No erythema.   Psychiatric: He has a normal mood and affect. His behavior is normal.   Nursing note and vitals reviewed.      Results Review:       I reviewed the patient's new clinical results.    Results from last 7 days  Lab Units 17  0849 17  0457 07/15/17  1737 17  0340 17  0351 17  0430 17  0419   WBC 10*3/mm3 11.13* 12.50* 14.16* 19.15* 18.63* 22.32* 24.81*   HEMOGLOBIN g/dL 12.7* 12.1* 12.4*  12.6* 12.6* 13.0* 12.8*   PLATELETS 10*3/mm3 261 266 277 279 284 271 255       Results from last 7 days  Lab Units 07/17/17  0849 07/16/17  0457 07/15/17  1737 07/14/17  0340 07/13/17  1652 07/13/17  0351 07/12/17 2006 07/12/17  0430 07/11/17  0419   SODIUM mmol/L 137 139 142 143  --  139  --  140 137   POTASSIUM mmol/L 3.4* 3.3* 3.5 3.7 3.9 3.6 4.1 3.2* 3.6   CHLORIDE mmol/L 100 104 107 112*  --  105  --  106 103   CO2 mmol/L 25.5 25.6 24.4 19.5*  --  21.0*  --  21.1* 19.9*   BUN mg/dL 7* 7* 8 9  --  15  --  32* 55*   CREATININE mg/dL 0.83 0.90 0.83 0.71*  --  0.88  --  0.98 1.18   GLUCOSE mg/dL 138* 98 136* 101*  --  106*  --  114* 108*   Estimated Creatinine Clearance: 109.9 mL/min (by C-G formula based on Cr of 0.83).    Results from last 7 days  Lab Units 07/17/17  0849 07/16/17  0457 07/15/17  1737 07/14/17  0340 07/13/17  0351 07/12/17  0430 07/11/17  0419   CALCIUM mg/dL 8.4* 8.5* 8.6 8.2* 7.8* 8.0* 8.1*   ALBUMIN g/dL 2.80* 2.70* 2.80*  --   --   --   --          aspirin 81 mg Oral Daily   carvedilol 12.5 mg Oral Daily   enoxaparin 40 mg Subcutaneous Daily   furosemide 40 mg Oral Daily   vancomycin 250 mg Oral Q6H      Diet Regular      Assessment/Plan   Assessment:     Active Hospital Problems (** Indicates Principal Problem)    Diagnosis Date Noted   • **Bilateral edema of lower extremity [R60.0] 07/15/2017   • C. difficile colitis [A04.7] 07/10/2017   • HTN (hypertension) [I10] 06/30/2017      Resolved Hospital Problems    Diagnosis Date Noted Date Resolved   No resolved problems to display.       Plan:     The patient feels too weak to go home today.    Physical therapy and CCP to evaluate him for possible rehabilitation placement.      His lower extremity edema is very likely to be secondary to his recent surgery as well as all of the other test negative.     LE Edema  - unclear cause. Albumin has dropped from 4 during prior admission to 2.7 but no proteinuria and Liver U/S essentially normal or could  be due to decreased appetite  -BNP is normal but since not renal and not liver and 2D echo normal  -LE dopplers negative   -lasix 40mg PO daily for a few days      Elevated AST/ALT  - were normal on 7/1. ProbablyDrug induced,  Holding statin  - INR is also slightly elevated  -Abd ultrasound with normal liver morphology  - hep panel and tylenol level normal  - monitor INR and AST/ALT daily     Has moderate GB sludge but no signs of obstruction, and no RUQ pain, no treatment needed  CDiff  - WBC is trending down  - will cont oral vanc, needs another 8 days (end on 7/24)    Right hip fracture: repalced last hospitalization, consult PT while here    Lovenox for PPx    Replaced K    Disposition  TBD.      Manuelito Abrams MD  [facility]  07/17/17  10:57 AM

## 2017-07-18 LAB
ALBUMIN SERPL-MCNC: 2.8 G/DL (ref 3.5–5.2)
ALBUMIN/GLOB SERPL: 1 G/DL
ALP SERPL-CCNC: 84 U/L (ref 39–117)
ALT SERPL W P-5'-P-CCNC: 113 U/L (ref 1–41)
ANION GAP SERPL CALCULATED.3IONS-SCNC: 13.3 MMOL/L
AST SERPL-CCNC: 66 U/L (ref 1–40)
BASOPHILS # BLD AUTO: 0.05 10*3/MM3 (ref 0–0.2)
BASOPHILS NFR BLD AUTO: 0.5 % (ref 0–1.5)
BILIRUB SERPL-MCNC: 0.7 MG/DL (ref 0.1–1.2)
BUN BLD-MCNC: 8 MG/DL (ref 8–23)
BUN/CREAT SERPL: 9.1 (ref 7–25)
CALCIUM SPEC-SCNC: 8.6 MG/DL (ref 8.6–10.5)
CHLORIDE SERPL-SCNC: 101 MMOL/L (ref 98–107)
CO2 SERPL-SCNC: 25.7 MMOL/L (ref 22–29)
CREAT BLD-MCNC: 0.88 MG/DL (ref 0.76–1.27)
DEPRECATED RDW RBC AUTO: 47.8 FL (ref 37–54)
EOSINOPHIL # BLD AUTO: 0.29 10*3/MM3 (ref 0–0.7)
EOSINOPHIL NFR BLD AUTO: 3 % (ref 0.3–6.2)
ERYTHROCYTE [DISTWIDTH] IN BLOOD BY AUTOMATED COUNT: 13.9 % (ref 11.5–14.5)
GFR SERPL CREATININE-BSD FRML MDRD: 86 ML/MIN/1.73
GLOBULIN UR ELPH-MCNC: 2.9 GM/DL
GLUCOSE BLD-MCNC: 94 MG/DL (ref 65–99)
HCT VFR BLD AUTO: 38 % (ref 40.4–52.2)
HGB BLD-MCNC: 12.5 G/DL (ref 13.7–17.6)
IMM GRANULOCYTES # BLD: 0.07 10*3/MM3 (ref 0–0.03)
IMM GRANULOCYTES NFR BLD: 0.7 % (ref 0–0.5)
INR PPP: 1.24 (ref 0.9–1.1)
LYMPHOCYTES # BLD AUTO: 1.99 10*3/MM3 (ref 0.9–4.8)
LYMPHOCYTES NFR BLD AUTO: 20.8 % (ref 19.6–45.3)
MCH RBC QN AUTO: 31.3 PG (ref 27–32.7)
MCHC RBC AUTO-ENTMCNC: 32.9 G/DL (ref 32.6–36.4)
MCV RBC AUTO: 95.2 FL (ref 79.8–96.2)
MONOCYTES # BLD AUTO: 0.84 10*3/MM3 (ref 0.2–1.2)
MONOCYTES NFR BLD AUTO: 8.8 % (ref 5–12)
NEUTROPHILS # BLD AUTO: 6.35 10*3/MM3 (ref 1.9–8.1)
NEUTROPHILS NFR BLD AUTO: 66.2 % (ref 42.7–76)
PLATELET # BLD AUTO: 270 10*3/MM3 (ref 140–500)
PMV BLD AUTO: 12.4 FL (ref 6–12)
POTASSIUM BLD-SCNC: 3.5 MMOL/L (ref 3.5–5.2)
PROT SERPL-MCNC: 5.7 G/DL (ref 6–8.5)
PROTHROMBIN TIME: 15.1 SECONDS (ref 11.7–14.2)
RBC # BLD AUTO: 3.99 10*6/MM3 (ref 4.6–6)
SODIUM BLD-SCNC: 140 MMOL/L (ref 136–145)
WBC NRBC COR # BLD: 9.59 10*3/MM3 (ref 4.5–10.7)

## 2017-07-18 PROCEDURE — 97110 THERAPEUTIC EXERCISES: CPT

## 2017-07-18 PROCEDURE — 25010000002 ENOXAPARIN PER 10 MG: Performed by: INTERNAL MEDICINE

## 2017-07-18 PROCEDURE — 85610 PROTHROMBIN TIME: CPT | Performed by: INTERNAL MEDICINE

## 2017-07-18 PROCEDURE — 80053 COMPREHEN METABOLIC PANEL: CPT | Performed by: INTERNAL MEDICINE

## 2017-07-18 PROCEDURE — 85025 COMPLETE CBC W/AUTO DIFF WBC: CPT | Performed by: INTERNAL MEDICINE

## 2017-07-18 RX ORDER — TEMAZEPAM 7.5 MG/1
7.5 CAPSULE ORAL NIGHTLY PRN
Status: DISCONTINUED | OUTPATIENT
Start: 2017-07-18 | End: 2017-07-20 | Stop reason: HOSPADM

## 2017-07-18 RX ADMIN — FUROSEMIDE 40 MG: 40 TABLET ORAL at 08:47

## 2017-07-18 RX ADMIN — TEMAZEPAM 7.5 MG: 7.5 CAPSULE ORAL at 23:58

## 2017-07-18 RX ADMIN — VANCOMYCIN 250 MG: KIT at 00:03

## 2017-07-18 RX ADMIN — ACETAMINOPHEN 650 MG: 325 TABLET ORAL at 18:01

## 2017-07-18 RX ADMIN — VANCOMYCIN 250 MG: KIT at 23:58

## 2017-07-18 RX ADMIN — ACETAMINOPHEN 650 MG: 325 TABLET ORAL at 00:03

## 2017-07-18 RX ADMIN — CARVEDILOL 12.5 MG: 12.5 TABLET, FILM COATED ORAL at 08:47

## 2017-07-18 RX ADMIN — ACETAMINOPHEN 650 MG: 325 TABLET ORAL at 12:09

## 2017-07-18 RX ADMIN — VANCOMYCIN 250 MG: KIT at 12:09

## 2017-07-18 RX ADMIN — ASPIRIN 81 MG: 81 TABLET, CHEWABLE ORAL at 08:47

## 2017-07-18 RX ADMIN — VANCOMYCIN 250 MG: KIT at 18:01

## 2017-07-18 RX ADMIN — ACETAMINOPHEN 650 MG: 325 TABLET ORAL at 06:05

## 2017-07-18 RX ADMIN — ENOXAPARIN SODIUM 40 MG: 40 INJECTION SUBCUTANEOUS at 08:47

## 2017-07-18 RX ADMIN — VANCOMYCIN 250 MG: KIT at 06:05

## 2017-07-18 RX ADMIN — ACETAMINOPHEN 650 MG: 325 TABLET ORAL at 23:58

## 2017-07-18 NOTE — PLAN OF CARE
Problem: Patient Care Overview (Adult)  Goal: Plan of Care Review  Outcome: Ongoing (interventions implemented as appropriate)    07/18/17 0533   Coping/Psychosocial Response Interventions   Plan Of Care Reviewed With patient   Patient Care Overview   Progress improving   Outcome Evaluation   Outcome Summary/Follow up Plan VSS, no loose stools on night shift, tylenol for pain, educated on the importance of BP monitoring at home related to HTN       Goal: Adult Individualization and Mutuality  Outcome: Ongoing (interventions implemented as appropriate)    07/18/17 0533   Individualization   Patient Specific Preferences none stated   Patient Specific Goals resolved c diff infection   Patient Specific Interventions administer abx as prescribed       Goal: Discharge Needs Assessment  Outcome: Ongoing (interventions implemented as appropriate)    07/17/17 1530 07/18/17 0533   Discharge Needs Assessment   Concerns To Be Addressed --  basic needs concerns   Readmission Within The Last 30 Days --  current reason for admission unrelated to previous admission   Equipment Needed After Discharge --  walker, rolling;wound care supplies   Discharge Facility/Level Of Care Needs --  acute rehab   Discharge Disposition --  still a patient   Self-Care   Equipment Currently Used at Home walker, rolling --    Current Health   Anticipated Changes Related to Illness --  inability to care for self   Living Environment   Transportation Available --  car;family or friend will provide         Problem: Fluid Volume Deficit (Adult)  Goal: Fluid/Electrolyte Balance  Outcome: Ongoing (interventions implemented as appropriate)    07/18/17 0533   Fluid Volume Deficit (Adult)   Fluid/Electrolyte Balance making progress toward outcome       Goal: Comfort/Well Being  Outcome: Ongoing (interventions implemented as appropriate)    07/18/17 0533   Fluid Volume Deficit (Adult)   Comfort/Well Being making progress toward outcome         Problem: Diarrhea  (Adult)  Goal: Improved/Reduced Symptoms  Outcome: Ongoing (interventions implemented as appropriate)    07/18/17 0533   Diarrhea (Adult)   Improved/Reduced Symptoms making progress toward outcome         Problem: Fall Risk (Adult)  Goal: Absence of Falls  Outcome: Ongoing (interventions implemented as appropriate)    07/18/17 0533   Fall Risk (Adult)   Absence of Falls achieves outcome         Problem: Pressure Ulcer (Adult)  Goal: Signs and Symptoms of Listed Potential Problems Will be Absent or Manageable (Pressure Ulcer)  Outcome: Ongoing (interventions implemented as appropriate)    07/18/17 0533   Pressure Ulcer   Problems Assessed (Pressure Ulcer) all   Problems Present (Pressure Ulcer) none

## 2017-07-18 NOTE — PROGRESS NOTES
Discharge Planning Assessment  [facility]     Patient Name: Demetris Nye  MRN: [id number]  Today's Date: 7/18/2017    Admit Date: 7/15/2017          Discharge Needs Assessment     None            Discharge Plan       07/18/17 1713    Case Management/Social Work Plan    Additional Comments S/w pt's workers comp   regarding SNF placement for rehab (Pat [phone]). Previously WC agreed to pay $230/day for SNF, there were no accepting facilities at that rate. S/w Gemma/Signature they can accept at the rate of $425/day.  Pat/WC to speak w/ pt's . f/U 7/19.        07/18/17 1534    Case Management/Social Work Plan    Plan SNF        Discharge Placement     Facility/Agency Request Status Selected? Address Phone Number Fax Number    [facility] Pending - Request Sent     [address] [address] [phone]        Mavis Paniagua RN 7/18/2017 15:34    7/18/2017  Noemi notified.                [facility] Pending - Request Sent     [address] [id number] [phone]        Mavis Paniagua RN 7/18/2017 17:12    7/18/2017  Gemma  Notified.                            Demographic Summary     None            Functional Status     None            Psychosocial     None            Abuse/Neglect     None            Legal     None            Substance Abuse     None            Patient Forms     None          Mavis Paniagua RN

## 2017-07-18 NOTE — PLAN OF CARE
Problem: Patient Care Overview (Adult)  Goal: Plan of Care Review  Outcome: Ongoing (interventions implemented as appropriate)    07/18/17 2111   Coping/Psychosocial Response Interventions   Plan Of Care Reviewed With patient   Outcome Evaluation   Outcome Summary/Follow up Plan Pt increased mobility and tolerated exercises way. Progressing w PT.

## 2017-07-18 NOTE — DISCHARGE PLACEMENT REQUEST
"Herbert Schneider (71 y.o. Male)     Date of Birth Social Security Number Address Home Phone MRN    1945  [address] [id number] [phone] [id number]    [employer] Marital Status          [employer]        Admission Date Admission Type Admitting Provider Attending Provider Department, Room/Bed    7/15/17 Emergency Arsenio Johansen MD [place], Manuelito Muñiz MD [address], P792/1    Discharge Date Discharge Disposition Discharge Destination                      Attending Provider: Manuelito Abrams MD     Allergies:  No Known Allergies    Isolation:  Spore   Infection:  C.difficile (07/10/17)   Code Status:  FULL    Ht:  72\" (182.9 cm)   Wt:  260 lb (118 kg)    Admission Cmt:  None   Principal Problem:  Bilateral edema of lower extremity [R60.0]                 Active Insurance as of 7/15/2017     Primary Coverage     Payor Plan Insurance Group Employer/Plan Group    WORKERS COMPENSATION TRAVELERS WORK COMP      Payor Plan Address Payor Plan Phone Number Effective From Effective To    [address] [address]  6/30/2017     [place] [address]       Subscriber Name Subscriber Birth Date Member ID       HERBERT SCHNEIDER 1945 [id number]                 Emergency Contacts      (Rel.) Home Phone Work Phone Mobile Phone    Marci Schneider (Spouse) [phone] -- --              "

## 2017-07-18 NOTE — PLAN OF CARE
Problem: Patient Care Overview (Adult)  Goal: Plan of Care Review  Outcome: Ongoing (interventions implemented as appropriate)    07/18/17 6171   Coping/Psychosocial Response Interventions   Plan Of Care Reviewed With patient   Patient Care Overview   Progress improving   Outcome Evaluation   Outcome Summary/Follow up Plan Discharge [place] working on rehab placement. Probably discharge tomorrow. Pt instructed pt that C diff should be gone. Abdominal pain and loose stools could last for few weeks post infection. Vancamycin oral continues. Denies pain. 2 stools today. Improve food intake today. Up multiple times ambulated with PT          Problem: Fluid Volume Deficit (Adult)  Goal: Fluid/Electrolyte Balance  Outcome: Ongoing (interventions implemented as appropriate)    Problem: Diarrhea (Adult)  Goal: Improved/Reduced Symptoms  Outcome: Ongoing (interventions implemented as appropriate)    Problem: Fall Risk (Adult)  Goal: Absence of Falls  Outcome: Ongoing (interventions implemented as appropriate)

## 2017-07-18 NOTE — THERAPY TREATMENT NOTE
Acute Care - Physical Therapy Treatment Note  [facility]     Patient Name: Demetris Nye  : 1945  MRN: [id number]  Today's Date: 2017  Onset of Illness/Injury or Date of Surgery Date: 07/15/17          Admit Date: 7/15/2017    Visit Dx:    ICD-10-CM ICD-9-CM   1. Bilateral edema of lower extremity R60.0 782.3   2. C. difficile diarrhea A04.7 008.45   3. Hypoalbuminemia E88.09 273.8   4. Blister of right leg, initial encounter S80.821A 916.2   5. Difficulty walking R26.2 719.7     Patient Active Problem List   Diagnosis   • Fall   • Closed right hip fracture   • HTN (hypertension)   • PVC (premature ventricular contraction)   • RUDY   • Hyponatremia   • Ileus, postoperative   • Colitis   • C. difficile colitis   • Bilateral edema of lower extremity               Adult Rehabilitation Note       17 1400          Rehab Assessment/Intervention    Discipline physical therapy assistant  -RW      Document Type therapy note (daily note)  -RW      Subjective Information agree to therapy  -RW      Patient Effort, Rehab Treatment good  -RW      Precautions/Limitations fall precautions  -RW      Recorded by [RW] Amanda Ospina PTA      Pain Assessment    Pain Assessment 0-10  -RW      Pain Score 4  -RW      Pain Type Surgical pain  -RW      Pain Location Hip  -RW      Pain Orientation Right  -RW      Pain Intervention(s) Repositioned;Ambulation/increased activity  -RW      Recorded by [RW] Amanda Ospina PTA      Vision Assessment/Intervention    Visual Impairment WFL with corrective lenses  -RW      Recorded by [RW] Amanda Ospina PTA      Cognitive Assessment/Intervention    Current Cognitive/Communication Assessment functional  -RW      Orientation Status oriented x 4  -RW      Follows Commands/Answers Questions 100% of the time  -RW      Personal Safety mild impairment;at risk behaviors demonstrated  -RW      Personal Safety Interventions fall prevention program maintained;gait belt;nonskid  shoes/slippers when out of bed  -RW      Recorded by [RW] Amanda Ospina PTA      Bed Mobility, Assessment/Treatment    Bed Mob, Supine to Sit, [place] not tested  -RW      Bed Mob, Sit to Supine, [place] not tested  -RW      Bed Mobility, Comment Pt up in chair  -RW      Recorded by [RW] Amanda Ospina PTA      Transfer Assessment/Treatment    Transfers, Sit-Stand [place] contact guard assist;minimum assist (75% patient effort);verbal cues required  -RW      Transfers, Stand-Sit [place] contact guard assist;verbal cues required  -RW      Transfers, Sit-Stand-Sit, Assist Device rolling walker  -RW      Recorded by [RW] Amanda Ospina PTA      Gait Assessment/Treatment    Gait, [place]don Level contact guard assist;verbal cues required  -RW      Gait, Assistive Device rolling walker  -RW      Gait, Distance (Feet) 40  -RW      Gait, Gait Deviations forward flexed posture;bilateral:;tamara decreased;decreased heel strike;step length decreased  -RW      Gait, Safety Issues balance decreased during turns;step length decreased  -RW      Gait, Impairments strength decreased  -RW      Recorded by [RW] Amanda Ospina PTA      Therapy Exercises    Exercise Protocols hip ORIF  -RW      Hip ORIF Exercises right:;10 reps;completed protocol;with assist;heel slides;hip abduction  -RW      Recorded by [RW] Amanda Ospina PTA      Positioning and Restraints    Pre-Treatment Position sitting in chair/recliner  -RW      Post Treatment Position chair  -RW      In Chair reclined;call light within reach;encouraged to call for assist  -RW      Recorded by [RW] Amanda Ospina PTA        User Key  (r) = Recorded By, (t) = Taken By, (c) = Cosigned By    Initials Name Effective Dates    RW Amanda Ospina PTA 04/06/16 -                 IP PT Goals       07/17/17 1558          Bed Mobility PT LTG    Bed Mobility PT LTG, Date Established 07/17/17  -DM      Bed Mobility PT LTG, Time to Achieve 1 wk  -DM       Bed Mobility PT LTG, Activity Type all bed mobility  -DM      Bed Mobility PT LTG, [place] Level independent  -DM      Transfer Training PT LTG    Transfer Training PT LTG, Date Established 07/17/17  -DM      Transfer Training PT LTG, Time to Achieve 1 wk  -DM      Transfer Training PT LTG, Activity Type all transfers  -DM      Transfer Training PT LTG, [place] Level independent  -DM      Transfer Training PT LTG, Assist Device walker, rolling  -DM      Gait Training PT LTG    Gait Training Goal PT LTG, Date Established 07/17/17  -DM      Gait Training Goal PT LTG, Time to Achieve 1 wk  -DM      Gait Training Goal PT LTG, [place] Level independent  -DM      Gait Training Goal PT LTG, Assist Device walker, rolling  -DM      Gait Training Goal PT LTG, Distance to Achieve 150  -DM        User Key  (r) = Recorded By, (t) = Taken By, (c) = Cosigned By    Initials Name Provider Type    KASANDRA Barbosa, PT Physical Therapist          Physical Therapy Education     Title: PT OT SLP Therapies (Done)     Topic: Physical Therapy (Done)     Point: Mobility training (Done)    Learning Progress Summary    Learner Readiness Method Response Comment Documented by Status   Patient Acceptance E,TB,D VU,NR  RW 07/18/17 1456 Done    Acceptance E,TB,D DU,NR  DM 07/17/17 1558 Done               Point: Home exercise program (Done)    Learning Progress Summary    Learner Readiness Method Response Comment Documented by Status   Patient Acceptance E,TB,D VU,NR  RW 07/18/17 1456 Done    Acceptance E,TB,D DU,NR  DM 07/17/17 1558 Done               Point: Body mechanics (Done)    Learning Progress Summary    Learner Readiness Method Response Comment Documented by Status   Patient Acceptance E,TB,D VU,NR  RW 07/18/17 1456 Done    Acceptance E,TB,D DU,NR  DM 07/17/17 1558 Done               Point: Precautions (Done)    Learning Progress Summary    Learner Readiness Method Response Comment Documented by Status   Patient Acceptance  E,TB,ELLEN VU,NR  RW 07/18/17 1456 Done    Acceptance E,TB,D DU,NR  DM 07/17/17 1558 Done                      User Key     Initials Effective Dates Name Provider Type Discipline    DM 10/06/15 -  Lori Barbosa, PT Physical Therapist PT    RW 04/06/16 -  Amanda Ospina PTA Physical Therapy Assistant PT                    PT Recommendation and Plan  Anticipated Discharge Disposition: skilled nursing facility  Planned Therapy Interventions: balance training, bed mobility training, gait training, home exercise program, patient/family education, strengthening, transfer training  PT Frequency: daily  Plan of Care Review  Plan Of Care Reviewed With: patient  Outcome Summary/Follow up Plan: Pt increased mobility and tolerated exercises way. Progressing w PT.          Outcome Measures       07/18/17 1400 07/17/17 1600       How much help from another person do you currently need...    Turning from your back to your side while in flat bed without using bedrails? 3  -RW 3  -DM     Moving from lying on back to sitting on the side of a flat bed without bedrails? 3  -RW 3  -DM     Moving to and from a bed to a chair (including a wheelchair)? 3  -RW 3  -DM     Standing up from a chair using your arms (e.g., wheelchair, bedside chair)? 3  -RW 3  -DM     Climbing 3-5 steps with a railing? 2  -RW 2  -DM     To walk in hospital room? 3  -RW 3  -DM     AM-PAC 6 Clicks Score 17  -RW 17  -DM     Functional Assessment    Outcome Measure Options AM-PAC 6 Clicks Basic Mobility (PT)  -RW AM-PAC 6 Clicks Basic Mobility (PT)  -DM       User Key  (r) = Recorded By, (t) = Taken By, (c) = Cosigned By    Initials Name Provider Type    DM Lori Barbosa, PT Physical Therapist    RW Amanda Ospina, MICAELA Physical Therapy Assistant           Time Calculation:         PT Charges       07/18/17 1443          Time Calculation    Start Time 1441  -RW      Stop Time 1455  -RW      Time Calculation (min) 14 min  -RW      PT Received On 07/18/17  -RW      PT -  Next Appointment 07/19/17  -GIORGI        User Key  (r) = Recorded By, (t) = Taken By, (c) = Cosigned By    Initials Name Provider Type     Amanda Ospina PTA Physical Therapy Assistant          Therapy Charges for Today     Code Description Service Date Service Provider Modifiers Qty    03162372459 HC PT THER PROC EA 15 MIN 7/18/2017 Amanda Ospina PTA GP 1          PT G-Codes  Outcome Measure Options: AM-PAC 6 Clicks Basic Mobility (PT)    Amanda Ospina PTA  7/18/2017

## 2017-07-18 NOTE — PROGRESS NOTES
Name: Demetris Nye ADMIT: 7/15/2017   : 1945  PCP: Rian Morfin MD    MRN: [id number] LOS: 3 days   AGE/SEX: 71 y.o. male  ROOM: [facility]/   Subjective      Still with LE edema mildly improved  No soa or chest pain  No diarrhea but 2 BM today  Poor appetite still but ate more today  No fevers or chills, white blood cell count normal now    Subjective    Objective   Vital Signs  Temp:  [96.8 °F (36 °C)-98.2 °F (36.8 °C)] 97.9 °F (36.6 °C)  Heart Rate:  [53-60] 54  Resp:  [18] 18  BP: (132-145)/(70-85) 135/70  SpO2:  [93 %-95 %] 94 %  on   ;   O2 Device: room air  Body mass index is 35.26 kg/(m^2).    Physical Exam   Constitutional: He is oriented to person, place, and time. He appears well-developed and well-nourished. No distress.   HENT:   Head: Normocephalic and atraumatic.   Eyes: Pupils are equal, round, and reactive to light. No scleral icterus.   Neck: No JVD present.   Cardiovascular: Normal rate and regular rhythm.    No murmur heard.  Pulmonary/Chest: Effort normal and breath sounds normal. No stridor. No respiratory distress. He has no wheezes. He has no rales.   Abdominal: Soft. Bowel sounds are normal. He exhibits no distension (Obese). There is no rebound and no guarding.   Musculoskeletal: He exhibits edema (2+ lower extremity edema). He exhibits no tenderness.   Neurological: He is alert and oriented to person, place, and time.   Decreased sensation of left 4th and 5th fingers but good strength   Skin: Skin is warm and dry. No rash noted. He is not diaphoretic. No erythema.   Psychiatric: He has a normal mood and affect. His behavior is normal.   Nursing note and vitals reviewed.      Results Review:       I reviewed the patient's new clinical results.    Results from last 7 days  Lab Units 17  0440 17  0849 17  0457 07/15/17  1737 17  0340 17  0351 17  0430   WBC 10*3/mm3 9.59 11.13* 12.50* 14.16* 19.15* 18.63* 22.32*   HEMOGLOBIN g/dL 12.5*  12.7* 12.1* 12.4* 12.6* 12.6* 13.0*   PLATELETS 10*3/mm3 270 261 266 277 279 284 271       Results from last 7 days  Lab Units 07/18/17  0440 07/17/17  0849 07/16/17  0457 07/15/17  1737 07/14/17  0340 07/13/17  1652 07/13/17  0351  07/12/17  0430   SODIUM mmol/L 140 137 139 142 143  --  139  --  140   POTASSIUM mmol/L 3.5 3.4* 3.3* 3.5 3.7 3.9 3.6  < > 3.2*   CHLORIDE mmol/L 101 100 104 107 112*  --  105  --  106   CO2 mmol/L 25.7 25.5 25.6 24.4 19.5*  --  21.0*  --  21.1*   BUN mg/dL 8 7* 7* 8 9  --  15  --  32*   CREATININE mg/dL 0.88 0.83 0.90 0.83 0.71*  --  0.88  --  0.98   GLUCOSE mg/dL 94 138* 98 136* 101*  --  106*  --  114*   < > = values in this interval not displayed.Estimated Creatinine Clearance: 103.6 mL/min (by C-G formula based on Cr of 0.88).    Results from last 7 days  Lab Units 07/18/17  0440 07/17/17  0849 07/16/17  0457 07/15/17  1737 07/14/17  0340 07/13/17  0351 07/12/17  0430   CALCIUM mg/dL 8.6 8.4* 8.5* 8.6 8.2* 7.8* 8.0*   ALBUMIN g/dL 2.80* 2.80* 2.70* 2.80*  --   --   --          aspirin 81 mg Oral Daily   carvedilol 12.5 mg Oral Daily   enoxaparin 40 mg Subcutaneous Daily   furosemide 40 mg Oral Daily   vancomycin 250 mg Oral Q6H      Diet Regular      Assessment/Plan   Assessment:     Active Hospital Problems (** Indicates Principal Problem)    Diagnosis Date Noted   • **Bilateral edema of lower extremity [R60.0] 07/15/2017   • C. difficile colitis [A04.7] 07/10/2017   • HTN (hypertension) [I10] 06/30/2017      Resolved Hospital Problems    Diagnosis Date Noted Date Resolved   No resolved problems to display.       Plan:     The patient feels too weak to go home today. He will need rehab, medically stable for d/c     His lower extremity edema is very likely to be secondary to his recent surgery as well as all of the other test negative.     LE Edema  - unclear cause. Albumin has dropped from 4 during prior admission to 2.8 but no proteinuria and Liver U/S essentially normal or could  be due to decreased appetite  -BNP is normal but since not renal and not liver and 2D echo normal  -LE dopplers negative   -lasix 40mg PO daily for a few days      Elevated AST/ALT  - were normal on 7/1. Probably drug induced,  Holding statin  - INR is also slightly elevated  -Abd ultrasound with normal liver morphology  - hep panel and tylenol level normal  - monitor INR and AST/ALT daily     Has moderate GB sludge but no signs of obstruction, and no RUQ pain, no treatment needed  CDiff  - WBC is trending down  - will cont oral vanc, needs another 8 days (end on 7/24)    Left mononeuropathy of ulnar nerve: only 4th and 5th fingers affected, observe, no further w/u needed    Right hip fracture: repalced last hospitalization, consult PT while here    Lovenox for PPx    Replaced K    Disposition  Whenever gets rehab      Manuelito bArams MD  [facility]  07/18/17  9:18 AM

## 2017-07-19 PROCEDURE — 25010000002 ENOXAPARIN PER 10 MG: Performed by: INTERNAL MEDICINE

## 2017-07-19 PROCEDURE — 97110 THERAPEUTIC EXERCISES: CPT

## 2017-07-19 RX ADMIN — VANCOMYCIN 250 MG: KIT at 05:47

## 2017-07-19 RX ADMIN — FUROSEMIDE 40 MG: 40 TABLET ORAL at 09:16

## 2017-07-19 RX ADMIN — ACETAMINOPHEN 650 MG: 325 TABLET ORAL at 05:48

## 2017-07-19 RX ADMIN — ENOXAPARIN SODIUM 40 MG: 40 INJECTION SUBCUTANEOUS at 09:16

## 2017-07-19 RX ADMIN — ASPIRIN 81 MG: 81 TABLET, CHEWABLE ORAL at 09:16

## 2017-07-19 RX ADMIN — VANCOMYCIN 250 MG: KIT at 12:37

## 2017-07-19 RX ADMIN — CARVEDILOL 12.5 MG: 12.5 TABLET, FILM COATED ORAL at 09:17

## 2017-07-19 RX ADMIN — ACETAMINOPHEN 650 MG: 325 TABLET ORAL at 12:37

## 2017-07-19 RX ADMIN — VANCOMYCIN 250 MG: KIT at 18:02

## 2017-07-19 RX ADMIN — ACETAMINOPHEN 650 MG: 325 TABLET ORAL at 18:02

## 2017-07-19 NOTE — PLAN OF CARE
Problem: Patient Care Overview (Adult)  Goal: Plan of Care Review  Outcome: Ongoing (interventions implemented as appropriate)    07/19/17 0482   Coping/Psychosocial Response Interventions   Plan Of Care Reviewed With patient   Patient Care Overview   Progress improving   Outcome Evaluation   Outcome Summary/Follow up Plan Pt alert and oriented x4, sitting in chair most of night for comfort. Tylenol PRN for pain. Oral vancomycin per MD order. Plan for discharge to rehab today. Stools slowing, up with staff assistance x1. Discussed importance of ambulation, oral hydration to prevent ileus and post-op complications. WIll continue to monitor          Problem: Fall Risk (Adult)  Goal: Absence of Falls  Outcome: Ongoing (interventions implemented as appropriate)    Problem: Pain, Acute (Adult)  Goal: Acceptable Pain Control/Comfort Level  Outcome: Ongoing (interventions implemented as appropriate)

## 2017-07-19 NOTE — THERAPY TREATMENT NOTE
Acute Care - Physical Therapy Treatment Note  [facility]     Patient Name: Demetris Nye  : 1945  MRN: [id number]  Today's Date: 2017  Onset of Illness/Injury or Date of Surgery Date: 07/15/17          Admit Date: 7/15/2017    Visit Dx:    ICD-10-CM ICD-9-CM   1. Bilateral edema of lower extremity R60.0 782.3   2. C. difficile diarrhea A04.7 008.45   3. Hypoalbuminemia E88.09 273.8   4. Blister of right leg, initial encounter S80.821A 916.2   5. Difficulty walking R26.2 719.7     Patient Active Problem List   Diagnosis   • Fall   • Closed right hip fracture   • HTN (hypertension)   • PVC (premature ventricular contraction)   • RUDY   • Hyponatremia   • Ileus, postoperative   • Colitis   • C. difficile colitis   • Bilateral edema of lower extremity               Adult Rehabilitation Note       17 1337 17 1400       Rehab Assessment/Intervention    Discipline physical therapy assistant  - physical therapy assistant  -     Document Type therapy note (daily note)  - therapy note (daily note)  -     Subjective Information agree to therapy;complains of;weakness;fatigue;pain  - agree to therapy  -RW     Patient Effort, Rehab Treatment  good  -RW     Precautions/Limitations fall precautions  - fall precautions  -RW     Recorded by [JM] Samaria Schaffer PTA [RW] Amanda Ospina PTA     Pain Assessment    Pain Assessment 0-10  - 0-10  -RW     Pain Score 3  - 4  -RW     Post Pain Score 4  -      Pain Type Surgical pain  - Surgical pain  -RW     Pain Location Hip  - Hip  -RW     Pain Orientation Right  - Right  -RW     Pain Intervention(s) Medication (See MAR);Repositioned;Cold applied;Ambulation/increased activity   tylenol only  - Repositioned;Ambulation/increased activity  -     Response to Interventions shalonda  -      Recorded by [AMBROSIO] Samaria Schaffer PTA [RW] Amanda Ospina PTA     Vision Assessment/Intervention    Visual Impairment  WFL with corrective lenses   -RW     Recorded by  [RW] Amanda Ospina PTA     Cognitive Assessment/Intervention    Current Cognitive/Communication Assessment  functional  -RW     Orientation Status  oriented x 4  -RW     Follows Commands/Answers Questions  100% of the time  -RW     Personal Safety  mild impairment;at risk behaviors demonstrated  -RW     Personal Safety Interventions  fall prevention program maintained;gait belt;nonskid shoes/slippers when out of bed  -RW     Recorded by  [RW] Amanda Ospina PTA     Bed Mobility, Assessment/Treatment    Bed Mob, Supine to Sit, [place]  not tested  -RW     Bed Mob, Sit to Supine, [place]  not tested  -RW     Bed Mobility, Comment in chair  - Pt up in chair  -RW     Recorded by [JM] Samaria Schaffer PTA [RW] Amanda Ospina PTA     Transfer Assessment/Treatment    Transfers, Sit-Stand [place] minimum assist (75% patient effort);verbal cues required  - contact guard assist;minimum assist (75% patient effort);verbal cues required  -RW     Transfers, Stand-Sit [place] contact guard assist;stand by assist;verbal cues required  - contact guard assist;verbal cues required  -RW     Transfers, Sit-Stand-Sit, Assist Device rolling walker  - rolling walker  -RW     Recorded by [JM] Samaria Schaffer PTA [RW] Amanda Ospina PTA     Gait Assessment/Treatment    Gait, [place] Level contact guard assist;verbal cues required  - contact guard assist;verbal cues required  -RW     Gait, Assistive Device rolling walker  - rolling walker  -RW     Gait, Distance (Feet) 50  - 40  -RW     Gait, Gait Deviations tamara decreased;forward flexed posture;step length decreased  - forward flexed posture;bilateral:;tamara decreased;decreased heel strike;step length decreased  -RW     Gait, Safety Issues balance decreased during turns;step length decreased  - balance decreased during turns;step length decreased  -RW     Gait, Impairments strength decreased;impaired balance;pain   - strength decreased  -RW     Gait, Comment fatigue limiting amb dist  -JM      Recorded by [JM] Samaria Schaffer PTA [RW] Amanda Ospina PTA     Therapy Exercises    Exercise Protocols hip ORIF  - hip ORIF  -RW     Hip ORIF Exercises right:;15 reps;completed protocol;with assist;heel slides;hip abduction  - right:;10 reps;completed protocol;with assist;heel slides;hip abduction  -RW     Recorded by [AMBROSIO] Samaria Schaffer PTA [RW] Amanda Ospina PTA     Positioning and Restraints    Pre-Treatment Position sitting in chair/recliner  - sitting in chair/recliner  -RW     Post Treatment Position  chair  -RW     In Chair reclined;call light within reach;encouraged to call for assist;with family/caregiver  - reclined;call light within reach;encouraged to call for assist  -RW     Recorded by [AMBROSIO] Samaria Schaffer PTA [RW] Amanda Ospina PTA       User Key  (r) = Recorded By, (t) = Taken By, (c) = Cosigned By    Initials Name Effective Dates     Samaria Schaffer PTA 02/18/16 -     RW Amanda Ospina PTA 04/06/16 -                 IP PT Goals       07/17/17 1558          Bed Mobility PT LTG    Bed Mobility PT LTG, Date Established 07/17/17  -DM      Bed Mobility PT LTG, Time to Achieve 1 wk  -DM      Bed Mobility PT LTG, Activity Type all bed mobility  -DM      Bed Mobility PT LTG, [place] Level independent  -DM      Transfer Training PT LTG    Transfer Training PT LTG, Date Established 07/17/17  -DM      Transfer Training PT LTG, Time to Achieve 1 wk  -DM      Transfer Training PT LTG, Activity Type all transfers  -DM      Transfer Training PT LTG, [place] Level independent  -DM      Transfer Training PT LTG, Assist Device walker, rolling  -DM      Gait Training PT LTG    Gait Training Goal PT LTG, Date Established 07/17/17  -DM      Gait Training Goal PT LTG, Time to Achieve 1 wk  -DM      Gait Training Goal PT LTG, [place] Level independent  -DM      Gait Training Goal PT LTG, Assist  Device walker, rolling  -DM      Gait Training Goal PT LTG, Distance to Achieve 150  -DM        User Key  (r) = Recorded By, (t) = Taken By, (c) = Cosigned By    Initials Name Provider Type    DM Lori Barbosa, PT Physical Therapist          Physical Therapy Education     Title: PT OT SLP Therapies (Done)     Topic: Physical Therapy (Done)     Point: Mobility training (Done)    Learning Progress Summary    Learner Readiness Method Response Comment Documented by Status   Patient Acceptance E,TB,D VU,NR   07/19/17 1525 Done    Acceptance E,TB,D VU,[facility] 07/18/17 1456 Done    Acceptance E,TB,D DU,NR   07/17/17 1558 Done   Family Acceptance E,TB,D VU,[facility] 07/19/17 1525 Done               Point: Home exercise program (Done)    Learning Progress Summary    Learner Readiness Method Response Comment Documented by Status   Patient Acceptance E,TB,D VU,NR   07/19/17 1525 Done    Acceptance E,TB,D VU,[facility] 07/18/17 1456 Done    Acceptance E,TB,D DU,[facility] 07/17/17 1558 Done   Family Acceptance E,TB,D VU,[facility] 07/19/17 1525 Done               Point: Body mechanics (Done)    Learning Progress Summary    Learner Readiness Method Response Comment Documented by Status   Patient Acceptance E,TB,D VU,[facility] 07/19/17 1525 Done    Acceptance E,TB,D VU,[facility] 07/18/17 1456 Done    Acceptance E,TB,D DU,NR   07/17/17 1558 Done   Family Acceptance E,TB,D VU,[facility] 07/19/17 1525 Done               Point: Precautions (Done)    Learning Progress Summary    Learner Readiness Method Response Comment Documented by Status   Patient Acceptance E,TB,D VU,[facility] 07/19/17 1525 Done    Acceptance E,TB,D VU,[facility] 07/18/17 1456 Done    Acceptance E,TB,D DU,[facility] 07/17/17 1558 Done   Family Acceptance E,TB,D VU,[facility] 07/19/17 1525 Done                      User Key     Initials Effective Dates Name Provider Type Discipline     10/06/15 -  Lori Barbosa, PT Physical Therapist PT     02/18/16 -  Samaria Schaffer, PTA Physical Therapy  Assistant PT    RW 04/06/16 -  Amanda Ospina, MICAELA Physical Therapy Assistant PT                    PT Recommendation and Plan  Anticipated Discharge Disposition: skilled nursing facility  Planned Therapy Interventions: balance training, bed mobility training, gait training, home exercise program, patient/family education, strengthening, transfer training  PT Frequency: daily  Plan of Care Review  Plan Of Care Reviewed With: patient  Progress: improving  Outcome Summary/Follow up Plan: slight incr in amb dist, but still needs educ on balance and step length issues for safety          Outcome Measures       07/19/17 1500 07/18/17 1400 07/17/17 1600    How much help from another person do you currently need...    Turning from your back to your side while in flat bed without using bedrails? 3  -JM 3  -RW 3  -DM    Moving from lying on back to sitting on the side of a flat bed without bedrails? 3  -JM 3  -RW 3  -DM    Moving to and from a bed to a chair (including a wheelchair)? 3  -JM 3  -RW 3  -DM    Standing up from a chair using your arms (e.g., wheelchair, bedside chair)? 3  -JM 3  -RW 3  -DM    Climbing 3-5 steps with a railing? 2  -JM 2  -RW 2  -DM    To walk in hospital room? 3  -JM 3  -RW 3  -DM    AM-PAC 6 Clicks Score 17  -JM 17  -RW 17  -DM    Functional Assessment    Outcome Measure Options  AM-PAC 6 Clicks Basic Mobility (PT)  -RW AM-PAC 6 Clicks Basic Mobility (PT)  -DM      User Key  (r) = Recorded By, (t) = Taken By, (c) = Cosigned By    Initials Name Provider Type    KASANDRA Barbosa, PT Physical Therapist    AMBROSIO Schaffer, MICAELA Physical Therapy Assistant    GIORGI Ospina, MICAELA Physical Therapy Assistant           Time Calculation:         PT Charges       07/19/17 1518          Time Calculation    Start Time 1329  -      Stop Time 1350  -      Time Calculation (min) 21 min  -AMBROSIO      PT Received On 07/19/17  -      PT - Next Appointment 07/20/17  -        User Key  (r) = Recorded By,  (t) = Taken By, (c) = Cosigned By    Initials Name Provider Type    AMBROSIO Schaffer PTA Physical Therapy Assistant          Therapy Charges for Today     Code Description Service Date Service Provider Modifiers Qty    38560014487 HC PT THER PROC EA 15 MIN 7/19/2017 Samaria Schaffer PTA GP 1          PT G-Codes  Outcome Measure Options: AM-PAC 6 Clicks Basic Mobility (PT)    Samaria Schaffer PTA  7/19/2017

## 2017-07-19 NOTE — PROGRESS NOTES
"     LOS: 4 days   Primary Care Physician: Rian Morfin MD     Subjective   Doing okay.  Wife at bedside.  Not much appetite.    Vital Signs  Body mass index is 35.26 kg/(m^2).  Temp:  [97.2 °F (36.2 °C)-98.3 °F (36.8 °C)] 97.4 °F (36.3 °C)  Heart Rate:  [55-71] 71  Resp:  [16-18] 18  BP: (122-132)/(70-88) 122/75      Objective:  General Appearance:  In no acute distress (Obese).    Vital signs: (most recent): Blood pressure 122/75, pulse 71, temperature 97.4 °F (36.3 °C), temperature source Oral, resp. rate 18, height 72\" (182.9 cm), weight 260 lb (118 kg), SpO2 94 %.    Lungs:  There are decreased breath sounds.  No wheezes, rales or rhonchi.    Heart: Normal rate.  Regular rhythm.  No murmur.   Abdomen: Abdomen is soft and non-distended.  (Obese)Bowel sounds are normal.   There is no abdominal tenderness.   There is no splenomegaly. There is no hepatomegaly.   Extremities: There is dependent edema.  (1-2+ edema)  Neurological: Patient is alert.          Results Review:    I reviewed the patient's new clinical results.      Results from last 7 days  Lab Units 07/18/17  0440 07/17/17  0849   WBC 10*3/mm3 9.59 11.13*   HEMOGLOBIN g/dL 12.5* 12.7*   PLATELETS 10*3/mm3 270 261       Results from last 7 days  Lab Units 07/18/17  0440 07/17/17  0849   SODIUM mmol/L 140 137   POTASSIUM mmol/L 3.5 3.4*   CHLORIDE mmol/L 101 100   CO2 mmol/L 25.7 25.5   BUN mg/dL 8 7*   CREATININE mg/dL 0.88 0.83   CALCIUM mg/dL 8.6 8.4*   GLUCOSE mg/dL 94 138*       Results from last 7 days  Lab Units 07/18/17  0440   INR  1.24*     Hemoglobin A1C:No results found for: HGBA1C    Glucose Range:No results found for: POCGLU    No results found for: FAUXDPOM27    Lab Results   Component Value Date    TSH 1.950 07/01/2017       Assessment & Plan      Medication Review: Yes    Active Hospital Problems (** Indicates Principal Problem)    Diagnosis Date Noted   • **Bilateral edema of lower extremity [R60.0] 07/15/2017   • C. difficile " colitis [A04.7] 07/10/2017   • HTN (hypertension) [I10] 06/30/2017      Resolved Hospital Problems    Diagnosis Date Noted Date Resolved   No resolved problems to display.       Assessment/Plan  1.  Lower extremity edema secondary to protein loss.  Discussed with patient and wife.  Increase protein in diet.  Increase activity as tolerated.  He is on Lasix.  Potassium low yesterday.  Recheck labs in a.m.  2.  C. difficile colitis.  Continue vancomycin for 5 more days.  3.  Weakness.  Awaiting precert for rehabilitation  4.  Hypertension.  On Coreg and Lasix.  Recheck labs in a.m.    Annie Burroughs MD  07/19/17  12:43 PM

## 2017-07-19 NOTE — PLAN OF CARE
Problem: Patient Care Overview (Adult)  Goal: Plan of Care Review  Outcome: Ongoing (interventions implemented as appropriate)    07/19/17 1522   Coping/Psychosocial Response Interventions   Plan Of Care Reviewed With patient   Patient Care Overview   Progress improving   Outcome Evaluation   Outcome Summary/Follow up Plan slight incr in amb dist, but still needs educ on balance and step length issues for safety

## 2017-07-19 NOTE — PLAN OF CARE
Problem: Patient Care Overview (Adult)  Goal: Plan of Care Review  Outcome: Ongoing (interventions implemented as appropriate)    07/19/17 1704   Coping/Psychosocial Response Interventions   Plan Of Care Reviewed With patient   Patient Care Overview   Progress improving   Outcome Evaluation   Outcome Summary/Follow up Plan Pt appears to feel beeter today. Bowel sound improved. 2 bowel movements this shift. CCP continue to look for placement. Isolation continues       07/19/17 1704   Coping/Psychosocial Response Interventions   Plan Of Care Reviewed With patient   Patient Care Overview   Progress improving   Outcome Evaluation   Outcome Summary/Follow up Plan Pt appears to feel beeter today. Bowel sound improved. 2 bowel movements this shift. CCP continue to look for placement.         Isolationh    Problem: Fluid Volume Deficit (Adult)  Goal: Fluid/Electrolyte Balance  Outcome: Ongoing (interventions implemented as appropriate)    Problem: Diarrhea (Adult)  Goal: Improved/Reduced Symptoms  Outcome: Ongoing (interventions implemented as appropriate)    Problem: Fall Risk (Adult)  Goal: Absence of Falls  Outcome: Outcome(s) achieved Date Met:  07/19/17    Problem: Pressure Ulcer (Adult)  Goal: Signs and Symptoms of Listed Potential Problems Will be Absent or Manageable (Pressure Ulcer)  Outcome: Ongoing (interventions implemented as appropriate)    Problem: Pain, Acute (Adult)  Goal: Identify Related Risk Factors and Signs and Symptoms  Outcome: Outcome(s) achieved Date Met:  07/19/17  Goal: Acceptable Pain Control/Comfort Level  Outcome: Ongoing (interventions implemented as appropriate)

## 2017-07-20 VITALS
WEIGHT: 260 LBS | DIASTOLIC BLOOD PRESSURE: 76 MMHG | HEART RATE: 52 BPM | OXYGEN SATURATION: 98 % | TEMPERATURE: 98.3 F | HEIGHT: 72 IN | SYSTOLIC BLOOD PRESSURE: 147 MMHG | BODY MASS INDEX: 35.21 KG/M2 | RESPIRATION RATE: 18 BRPM

## 2017-07-20 LAB
ANION GAP SERPL CALCULATED.3IONS-SCNC: 13.4 MMOL/L
BUN BLD-MCNC: 9 MG/DL (ref 8–23)
BUN/CREAT SERPL: 9.8 (ref 7–25)
CALCIUM SPEC-SCNC: 8.6 MG/DL (ref 8.6–10.5)
CHLORIDE SERPL-SCNC: 99 MMOL/L (ref 98–107)
CO2 SERPL-SCNC: 26.6 MMOL/L (ref 22–29)
CREAT BLD-MCNC: 0.92 MG/DL (ref 0.76–1.27)
GFR SERPL CREATININE-BSD FRML MDRD: 81 ML/MIN/1.73
GLUCOSE BLD-MCNC: 92 MG/DL (ref 65–99)
POTASSIUM BLD-SCNC: 3.5 MMOL/L (ref 3.5–5.2)
SODIUM BLD-SCNC: 139 MMOL/L (ref 136–145)

## 2017-07-20 PROCEDURE — 80048 BASIC METABOLIC PNL TOTAL CA: CPT | Performed by: INTERNAL MEDICINE

## 2017-07-20 PROCEDURE — 97110 THERAPEUTIC EXERCISES: CPT

## 2017-07-20 PROCEDURE — 25010000002 ENOXAPARIN PER 10 MG: Performed by: INTERNAL MEDICINE

## 2017-07-20 RX ORDER — ACETAMINOPHEN 325 MG/1
650 TABLET ORAL EVERY 6 HOURS PRN
Refills: 0
Start: 2017-07-20

## 2017-07-20 RX ADMIN — FUROSEMIDE 40 MG: 40 TABLET ORAL at 08:56

## 2017-07-20 RX ADMIN — ENOXAPARIN SODIUM 40 MG: 40 INJECTION SUBCUTANEOUS at 08:55

## 2017-07-20 RX ADMIN — VANCOMYCIN 250 MG: KIT at 13:09

## 2017-07-20 RX ADMIN — CARVEDILOL 12.5 MG: 12.5 TABLET, FILM COATED ORAL at 08:56

## 2017-07-20 RX ADMIN — ASPIRIN 81 MG: 81 TABLET, CHEWABLE ORAL at 08:56

## 2017-07-20 RX ADMIN — ACETAMINOPHEN 650 MG: 325 TABLET ORAL at 00:04

## 2017-07-20 RX ADMIN — ACETAMINOPHEN 650 MG: 325 TABLET ORAL at 13:09

## 2017-07-20 RX ADMIN — VANCOMYCIN 250 MG: KIT at 00:04

## 2017-07-20 RX ADMIN — VANCOMYCIN 250 MG: KIT at 06:25

## 2017-07-20 RX ADMIN — ACETAMINOPHEN 650 MG: 325 TABLET ORAL at 06:25

## 2017-07-20 NOTE — THERAPY TREATMENT NOTE
Acute Care - Physical Therapy Treatment Note  [facility]     Patient Name: Demetris Nye  : 1945  MRN: [id number]  Today's Date: 2017  Onset of Illness/Injury or Date of Surgery Date: 07/15/17          Admit Date: 7/15/2017    Visit Dx:    ICD-10-CM ICD-9-CM   1. Bilateral edema of lower extremity R60.0 782.3   2. C. difficile diarrhea A04.7 008.45   3. Hypoalbuminemia E88.09 273.8   4. Blister of right leg, initial encounter S80.821A 916.2   5. Difficulty walking R26.2 719.7     Patient Active Problem List   Diagnosis   • Fall   • Closed right hip fracture   • HTN (hypertension)   • PVC (premature ventricular contraction)   • RUDY   • Hyponatremia   • Ileus, postoperative   • Colitis   • C. difficile colitis   • Bilateral edema of lower extremity               Adult Rehabilitation Note       17 1219 17 1337 17 1400    Rehab Assessment/Intervention    Discipline physical therapy assistant  - physical therapy assistant  - physical therapy assistant  -    Document Type therapy note (daily note)  - therapy note (daily note)  - therapy note (daily note)  -RW    Subjective Information agree to therapy;complains of;weakness;fatigue;pain  - agree to therapy;complains of;weakness;fatigue;pain  - agree to therapy  -RW    Patient Effort, Rehab Treatment   good  -RW    Precautions/Limitations fall precautions  - fall precautions  - fall precautions  -RW    Recorded by [JM] Samaria Schaffer PTA [JM] Samaria Schaffer PTA [RW] Amanda Ospina PTA    Pain Assessment    Pain Assessment 0-10  -JM 0-10  -JM 0-10  -RW    Pain Score 4  -JM 3  -JM 4  -RW    Post Pain Score  4  -JM     Pain Type Surgical pain  - Surgical pain  - Surgical pain  -RW    Pain Location Hip  - Hip  -JM Hip  -RW    Pain Orientation Right  - Right  - Right  -RW    Pain Intervention(s) Medication (See MAR);Repositioned;Cold applied  - Medication (See MAR);Repositioned;Cold  applied;Ambulation/increased activity   tylenol only  -JM Repositioned;Ambulation/increased activity  -RW    Response to Interventions shalonda  -JM shalonda  -JM     Recorded by [JM] Samaria Schaffer PTA [JM] Samaria Schaffer PTA [RW] Amanda Ospina PTA    Vision Assessment/Intervention    Visual Impairment   WFL with corrective lenses  -RW    Recorded by   [RW] Amanda Ospina PTA    Cognitive Assessment/Intervention    Current Cognitive/Communication Assessment   functional  -RW    Orientation Status   oriented x 4  -RW    Follows Commands/Answers Questions   100% of the time  -RW    Personal Safety   mild impairment;at risk behaviors demonstrated  -RW    Personal Safety Interventions   fall prevention program maintained;gait belt;nonskid shoes/slippers when out of bed  -RW    Recorded by   [RW] Amanda Ospina PTA    Bed Mobility, Assessment/Treatment    Bed Mob, Supine to Sit, [place]   not tested  -RW    Bed Mob, Sit to Supine, [place]   not tested  -RW    Bed Mobility, Comment in chair, just back from BR w/nsg  -JM in chair  -JM Pt up in chair  -RW    Recorded by [JM] Samaria Schaffer PTA [JM] Samaria Schaffer PTA [RW] Amanda Ospina PTA    Transfer Assessment/Treatment    Transfers, Sit-Stand [place] minimum assist (75% patient effort);verbal cues required  -JM minimum assist (75% patient effort);verbal cues required  - contact guard assist;minimum assist (75% patient effort);verbal cues required  -RW    Transfers, Stand-Sit [place] contact guard assist;stand by assist;verbal cues required  - contact guard assist;stand by assist;verbal cues required  - contact guard assist;verbal cues required  -RW    Transfers, Sit-Stand-Sit, Assist Device rolling walker  - standard walker  - rolling walker  -RW    Recorded by [JM] Samaria Schaffer PTA [JM] Samaria Schaffer PTA [RW] Amanda Ospina PTA    Gait Assessment/Treatment    Gait, [place] Level contact guard assist;verbal cues  required  -JM contact guard assist;verbal cues required  -JM contact guard assist;verbal cues required  -RW    Gait, Assistive Device rolling walker  -JM standard walker  -JM rolling walker  -RW    Gait, Distance (Feet) 65  -JM 50  -JM 40  -RW    Gait, Gait Deviations antalgic;tamara decreased;forward flexed posture;step length decreased  -JM tamara decreased;forward flexed posture;step length decreased  -JM forward flexed posture;bilateral:;tamara decreased;decreased heel strike;step length decreased  -RW    Gait, Safety Issues  balance decreased during turns;step length decreased  -JM balance decreased during turns;step length decreased  -RW    Gait, Impairments strength decreased;pain  -JM strength decreased;impaired balance;pain  -JM strength decreased  -RW    Gait, Comment improved balance w/cues and rwx   -JM fatigue limiting amb dist  -JM     Recorded by [JM] Samaria Schaffer PTA [JM] Samaria Schaffer PTA [RW] Amanda Ospina PTA    Therapy Exercises    Exercise Protocols hip ORIF  -JM hip ORIF  -JM hip ORIF  -RW    Hip ORIF Exercises right:;20 reps;LAQ   interupted lunch, did not finish exer; ad sarah isometrics ed  - right:;15 reps;completed protocol;with assist;heel slides;hip abduction  - right:;10 reps;completed protocol;with assist;heel slides;hip abduction  -RW    Recorded by [JM] Samaria Schaffer PTA [JM] Samaria Schaffer PTA [RW] Amanda Ospina PTA    Positioning and Restraints    Pre-Treatment Position sitting in chair/recliner  - sitting in chair/recliner  - sitting in chair/recliner  -RW    Post Treatment Position   chair  -RW    In Chair reclined;call light within reach;encouraged to call for assist;with family/caregiver  - reclined;call light within reach;encouraged to call for assist;with family/caregiver  - reclined;call light within reach;encouraged to call for assist  -RW    Recorded by [JM] Samaria Schaffer PTA [JM] Samaria Schaffer PTA [RW] Amanda Ospina PTA      User  Key  (r) = Recorded By, (t) = Taken By, (c) = Cosigned By    Initials Name Effective Dates     Samaria Schaffer, PTA 02/18/16 -     RW Amandasamir Ospina, PTA 04/06/16 -                 IP PT Goals       07/17/17 1558          Bed Mobility PT LTG    Bed Mobility PT LTG, Date Established 07/17/17  -DM      Bed Mobility PT LTG, Time to Achieve 1 wk  -DM      Bed Mobility PT LTG, Activity Type all bed mobility  -DM      Bed Mobility PT LTG, [place] Level independent  -DM      Transfer Training PT LTG    Transfer Training PT LTG, Date Established 07/17/17  -DM      Transfer Training PT LTG, Time to Achieve 1 wk  -DM      Transfer Training PT LTG, Activity Type all transfers  -DM      Transfer Training PT LTG, [place] Level independent  -DM      Transfer Training PT LTG, Assist Device walker, rolling  -DM      Gait Training PT LTG    Gait Training Goal PT LTG, Date Established 07/17/17  -DM      Gait Training Goal PT LTG, Time to Achieve 1 wk  -DM      Gait Training Goal PT LTG, [place] Level independent  -DM      Gait Training Goal PT LTG, Assist Device walker, rolling  -DM      Gait Training Goal PT LTG, Distance to Achieve 150  -DM        User Key  (r) = Recorded By, (t) = Taken By, (c) = Cosigned By    Initials Name Provider Type    DM Lori Barbosa, PT Physical Therapist          Physical Therapy Education     Title: PT OT SLP Therapies (Done)     Topic: Physical Therapy (Done)     Point: Mobility training (Done)    Learning Progress Summary    Learner Readiness Method Response Comment Documented by Status   Patient Acceptance E,TB,D RENAN   07/20/17 1224 Done    Acceptance E,TB,D VU,NR   07/19/17 1525 Done    Acceptance E,TB,D VU,NR   07/18/17 1456 Done    Acceptance E,TBELLEN,NR  DM 07/17/17 1558 Done   Family Acceptance E,TB,D RENAN   07/20/17 1224 Done    Acceptance E,TB,D RENAN,NR   07/19/17 1525 Done               Point: Home exercise program (Done)    Learning Progress Summary    Learner  Readiness Method Response Comment Documented by Status   Patient Acceptance E,TB,D VU   07/20/17 1224 Done    Acceptance E,TB,D VU,NR   07/19/17 1525 Done    Acceptance E,TB,D VU,NR   07/18/17 1456 Done    Acceptance E,TB,D DU,NR   07/17/17 1558 Done   Family Acceptance E,TB,D VU   07/20/17 1224 Done    Acceptance E,TB,D VU,NR   07/19/17 1525 Done               Point: Body mechanics (Done)    Learning Progress Summary    Learner Readiness Method Response Comment Documented by Status   Patient Acceptance E,TB,D VU   07/20/17 1224 Done    Acceptance E,TB,D VU,NR   07/19/17 1525 Done    Acceptance E,TB,D VU,NR   07/18/17 1456 Done    Acceptance E,TB,D DU,NR   07/17/17 1558 Done   Family Acceptance E,TB,D VU   07/20/17 1224 Done    Acceptance E,TB,D VU,NR   07/19/17 1525 Done               Point: Precautions (Done)    Learning Progress Summary    Learner Readiness Method Response Comment Documented by Status   Patient Acceptance E,TB,D VU   07/20/17 1224 Done    Acceptance E,TB,D VU,NR   07/19/17 1525 Done    Acceptance E,TB,D VU,NR   07/18/17 1456 Done    Acceptance E,TB,D DU,NR   07/17/17 1558 Done   Family Acceptance E,TB,D VU   07/20/17 1224 Done    Acceptance E,TB,D VU,NR   07/19/17 1525 Done                      User Key     Initials Effective Dates Name Provider Type Discipline     10/06/15 -  Lori Barbosa, PT Physical Therapist PT     02/18/16 -  Samaria Schaffer, PTA Physical Therapy Assistant PT     04/06/16 -  Amanda Ospina PTA Physical Therapy Assistant PT                    PT Recommendation and Plan  Anticipated Discharge Disposition: skilled nursing facility  Planned Therapy Interventions: balance training, bed mobility training, gait training, home exercise program, patient/family education, strengthening, transfer training  PT Frequency: daily  Plan of Care Review  Plan Of Care Reviewed With: patient, spouse  Progress: improving  Outcome Summary/Follow up  Plan: incr amb dist, but incr fatigue-req one standing rest to complete          Outcome Measures       07/20/17 1200 07/19/17 1500 07/18/17 1400    How much help from another person do you currently need...    Turning from your back to your side while in flat bed without using bedrails? 3  -JM 3  -JM 3  -RW    Moving from lying on back to sitting on the side of a flat bed without bedrails? 3  -JM 3  -JM 3  -RW    Moving to and from a bed to a chair (including a wheelchair)? 3  -JM 3  -JM 3  -RW    Standing up from a chair using your arms (e.g., wheelchair, bedside chair)? 3  -JM 3  -JM 3  -RW    Climbing 3-5 steps with a railing? 2  -JM 2  -JM 2  -RW    To walk in hospital room? 3  -JM 3  -JM 3  -RW    AM-PAC 6 Clicks Score 17  -JM 17  -JM 17  -RW    Functional Assessment    Outcome Measure Options   AM-PAC 6 Clicks Basic Mobility (PT)  -RW      07/17/17 1600          How much help from another person do you currently need...    Turning from your back to your side while in flat bed without using bedrails? 3  -DM      Moving from lying on back to sitting on the side of a flat bed without bedrails? 3  -DM      Moving to and from a bed to a chair (including a wheelchair)? 3  -DM      Standing up from a chair using your arms (e.g., wheelchair, bedside chair)? 3  -DM      Climbing 3-5 steps with a railing? 2  -DM      To walk in hospital room? 3  -DM      AM-PAC 6 Clicks Score 17  -DM      Functional Assessment    Outcome Measure Options AM-PAC 6 Clicks Basic Mobility (PT)  -DM        User Key  (r) = Recorded By, (t) = Taken By, (c) = Cosigned By    Initials Name Provider Type    KASANDRA Barbosa, PT Physical Therapist    AMBROSIO Schaffer, MICAELA Physical Therapy Assistant    GIORGI Ospina PTA Physical Therapy Assistant           Time Calculation:         PT Charges       07/20/17 1219          Time Calculation    Start Time 1135  -JM      Stop Time 1155  -JM      Time Calculation (min) 20 min  -      PT Received  On 07/20/17  -AMBROSIO      PT - Next Appointment 07/21/17  -AMBROSIO        User Key  (r) = Recorded By, (t) = Taken By, (c) = Cosigned By    Initials Name Provider Type    AMBROSIO Schaffer PTA Physical Therapy Assistant          Therapy Charges for Today     Code Description Service Date Service Provider Modifiers Qty    74948026416 HC PT THER PROC EA 15 MIN 7/19/2017 Samaria Schaffer PTA GP 1    31964694512 HC PT THER PROC EA 15 MIN 7/20/2017 Samaria Schaffer PTA GP 1          PT G-Codes  Outcome Measure Options: AM-PAC 6 Clicks Basic Mobility (PT)    Samaria Schaffer PTA  7/20/2017

## 2017-07-20 NOTE — PLAN OF CARE
Problem: Patient Care Overview (Adult)  Goal: Plan of Care Review  Outcome: Ongoing (interventions implemented as appropriate)    07/20/17 0431   Coping/Psychosocial Response Interventions   Plan Of Care Reviewed With patient   Patient Care Overview   Progress improving   Outcome Evaluation   Outcome Summary/Follow up Plan VSS, tylenol q 6 hours for pain, oral vancomycin for cdiff infection q 6 hours, assist x 1, educated on the importance of BP monitoring at home related to HTN        Goal: Adult Individualization and Mutuality  Outcome: Ongoing (interventions implemented as appropriate)    07/18/17 0533   Individualization   Patient Specific Preferences none stated   Patient Specific Goals resolved c diff infection   Patient Specific Interventions administer abx as prescribed       Goal: Discharge Needs Assessment  Outcome: Ongoing (interventions implemented as appropriate)    07/17/17 1530 07/18/17 0533   Discharge Needs Assessment   Concerns To Be Addressed --  basic needs concerns   Readmission Within The Last 30 Days --  current reason for admission unrelated to previous admission   Equipment Needed After Discharge --  walker, rolling;wound care supplies   Discharge Facility/Level Of Care Needs --  acute rehab   Discharge Disposition --  still a patient   Self-Care   Equipment Currently Used at Home walker, rolling --    Current Health   Anticipated Changes Related to Illness --  inability to care for self   Living Environment   Transportation Available --  car;family or friend will provide         Problem: Fluid Volume Deficit (Adult)  Goal: Fluid/Electrolyte Balance  Outcome: Ongoing (interventions implemented as appropriate)    07/20/17 0431   Fluid Volume Deficit (Adult)   Fluid/Electrolyte Balance making progress toward outcome       Goal: Comfort/Well Being  Outcome: Ongoing (interventions implemented as appropriate)    07/20/17 0431   Fluid Volume Deficit (Adult)   Comfort/Well Being making progress  toward outcome         Problem: Diarrhea (Adult)  Goal: Improved/Reduced Symptoms  Outcome: Ongoing (interventions implemented as appropriate)    07/20/17 0431   Diarrhea (Adult)   Improved/Reduced Symptoms making progress toward outcome         Problem: Fall Risk (Adult)  Goal: Absence of Falls  Outcome: Outcome(s) achieved Date Met:  07/20/17 07/20/17 0431   Fall Risk (Adult)   Absence of Falls achieves outcome         Problem: Pressure Ulcer (Adult)  Goal: Signs and Symptoms of Listed Potential Problems Will be Absent or Manageable (Pressure Ulcer)  Outcome: Ongoing (interventions implemented as appropriate)    07/20/17 0431   Pressure Ulcer   Problems Assessed (Pressure Ulcer) all   Problems Present (Pressure Ulcer) pain         Problem: Pain, Acute (Adult)  Goal: Acceptable Pain Control/Comfort Level  Outcome: Ongoing (interventions implemented as appropriate)    07/20/17 0431   Pain, Acute (Adult)   Acceptable Pain Control/Comfort Level making progress toward outcome

## 2017-07-20 NOTE — DISCHARGE SUMMARY
Date of Admission: 7/15/2017  Date of Discharge:  7/20/2017  Primary Care Physician: Rian Morfin MD     Discharge Diagnosis:  Active Hospital Problems (** Indicates Principal Problem)    Diagnosis Date Noted   • **Bilateral edema of lower extremity [R60.0] 07/15/2017   • C. difficile colitis [A04.7] 07/10/2017   • HTN (hypertension) [I10] 06/30/2017      Resolved Hospital Problems    Diagnosis Date Noted Date Resolved   No resolved problems to display.       Presenting Problem/History of Present Illness  Hypoalbuminemia [E88.09]  C. difficile diarrhea [A04.7]  Bilateral edema of lower extremity [R60.0]  Blister of right leg, initial encounter [S80.161A]     Hospital Course  The patient is a 71 y.o. male who presented with Leg swelling.  He had just been discharged from the hospital the day before after having had a right hip fracture and C. difficile colitis.  He had an echocardiogram and an ultrasound of the abdomen.  His lower extremity edema is likely secondary to some protein loss and inactivity related to the previous hospital stay and medical issues.  He was given several doses of Lasix.  MARY hose are being used.  Increasing his activity will help.  Follow low salt diet.  Physical therapy has been working with the patient.  He is improving but is still weak and not able to manage at home.  He is ready for transfer to subacute rehabilitation.  Precert has been obtained.     Regarding the C. difficile, he has several more days of oral vancomycin.  He is still having loose stools but only 1 or 2 per day.  If this is bothersome for him, he could be started on Questran.    Stable condition; good prognosis    Exam Today: No distress.  Vital signs noted.  Heart is regular and without murmur.  Lungs are clear.  Breath sounds are equal.  Abdomen is soft and nontender.  Nondistended.  Extremities 1 to 2+ edema with MARY hose on    Procedures Performed:  Ultrasound of the abdomen 7/15/17 which showed moderate  gallbladder sludge.  Bilateral renal cysts  Echocardiogram 7/16/17 which showed ejection fraction 61%.  Normal LV diastolic function.  RV systolic pressure 33     Labs  Today-glucose 92 BUN 9 creatinine 0.9 sodium 139 potassium 3.5 chloride 99 bicarbonate 27.  From yesterday:  AST 66 albumin 2.8 total protein 5.7.  Rest of the liver panel was normal  White count 9.6 hemoglobin 12.5 platelets 270,000  INR 1.2  Hepatitis A, B and C nonreactive      Consults:    None    Discharge Disposition  Skilled Nursing Facility (DC - External)    Discharge Medications:   Demetris Nye   Home Medication Instructions YOVANY:[id number]    Printed on:07/20/17 1823   Medication Information                      acetaminophen (TYLENOL) 325 MG tablet  Take 2 tablets by mouth Every 6 (Six) Hours As Needed for Mild Pain .             aspirin 81 MG chewable tablet  Chew 81 mg Daily.             carvedilol (COREG) 12.5 MG tablet  Take 12.5 mg by mouth Daily.             enoxaparin (LOVENOX) 40 MG/0.4ML solution syringe  Inject 0.4 mL under the skin Daily. Until activity level has increased             vancomycin 50 MG/ML solution oral solution  Take 5 mL by mouth Every 6 (Six) Hours for 10 days. Indications: Clostridium Difficile Infection                 Discharge Diet:   Diet Instructions     Diet: Cardiac, Specialty Diet; Thin Liquids, No Restrictions; Low Sodium       Discharge Diet:   Cardiac  Specialty Diet      Fluid Consistency:  Thin Liquids, No Restrictions   Specialty Diets:  Low Sodium                 Activity at Discharge:   Activity Instructions     Other Instructions (Specify)       Continue with physical therapy.  MARY hose during the day                 Follow-up Appointments:  Nursing home physician in one day  Dr. Rian Morfin 1-2 weeks after release from rehabilitation   Dr. Manuelito Alba 7-10 days for right hip surgery follow-up    Test Results Pending at Discharge- none       Annie Burroughs,  MD  07/20/17  1:08 PM    Time Spent on Discharge Activities: 35 minutes.  Discussed with patient, wife, CCP and nurse.  Medical record reviewed.

## 2017-07-20 NOTE — PLAN OF CARE
Problem: Patient Care Overview (Adult)  Goal: Plan of Care Review  Outcome: Ongoing (interventions implemented as appropriate)    07/20/17 1223   Coping/Psychosocial Response Interventions   Plan Of Care Reviewed With patient;spouse   Patient Care Overview   Progress improving   Outcome Evaluation   Outcome Summary/Follow up Plan incr amb dist, but incr fatigue-req one standing rest to complete

## 2017-10-12 ENCOUNTER — OFFICE (OUTPATIENT)
Dept: URBAN - METROPOLITAN AREA CLINIC 75 | Facility: CLINIC | Age: 72
End: 2017-10-12

## 2017-10-12 VITALS
WEIGHT: 229 LBS | SYSTOLIC BLOOD PRESSURE: 146 MMHG | DIASTOLIC BLOOD PRESSURE: 90 MMHG | HEART RATE: 70 BPM | HEIGHT: 71 IN

## 2017-10-12 DIAGNOSIS — Q27.33 ARTERIOVENOUS MALFORMATION OF DIGESTIVE SYSTEM VESSEL: ICD-10-CM

## 2017-10-12 DIAGNOSIS — R93.3 ABNORMAL FINDINGS ON DIAGNOSTIC IMAGING OF OTHER PARTS OF DI: ICD-10-CM

## 2017-10-12 DIAGNOSIS — A04.71 ENTEROCOLITIS DUE TO CLOSTRIDIUM DIFFICILE, RECURRENT: ICD-10-CM

## 2017-10-12 PROCEDURE — 99203 OFFICE O/P NEW LOW 30 MIN: CPT | Performed by: INTERNAL MEDICINE

## 2017-11-22 ENCOUNTER — OFFICE (OUTPATIENT)
Dept: URBAN - METROPOLITAN AREA CLINIC 75 | Facility: CLINIC | Age: 72
End: 2017-11-22

## 2017-11-22 VITALS
DIASTOLIC BLOOD PRESSURE: 82 MMHG | HEIGHT: 71 IN | HEART RATE: 84 BPM | WEIGHT: 220 LBS | SYSTOLIC BLOOD PRESSURE: 146 MMHG

## 2017-11-22 DIAGNOSIS — A04.72 ENTEROCOLITIS DUE TO CLOSTRIDIUM DIFFICILE, NOT SPECIFIED AS: ICD-10-CM

## 2017-11-22 DIAGNOSIS — R93.3 ABNORMAL FINDINGS ON DIAGNOSTIC IMAGING OF OTHER PARTS OF DI: ICD-10-CM

## 2017-11-22 DIAGNOSIS — Q27.33 ARTERIOVENOUS MALFORMATION OF DIGESTIVE SYSTEM VESSEL: ICD-10-CM

## 2017-11-22 PROCEDURE — 99213 OFFICE O/P EST LOW 20 MIN: CPT | Performed by: INTERNAL MEDICINE

## 2020-06-29 ENCOUNTER — TRANSCRIBE ORDERS (OUTPATIENT)
Dept: ADMINISTRATIVE | Facility: HOSPITAL | Age: 75
End: 2020-06-29

## 2020-06-29 ENCOUNTER — TRANSCRIBE ORDERS (OUTPATIENT)
Dept: CARDIOLOGY | Facility: HOSPITAL | Age: 75
End: 2020-06-29

## 2020-06-29 ENCOUNTER — HOSPITAL ENCOUNTER (OUTPATIENT)
Dept: GENERAL RADIOLOGY | Facility: HOSPITAL | Age: 75
Discharge: HOME | End: 2020-06-29

## 2020-06-29 ENCOUNTER — LAB (OUTPATIENT)
Dept: LAB | Facility: HOSPITAL | Age: 75
End: 2020-06-29

## 2020-06-29 ENCOUNTER — HOSPITAL ENCOUNTER (OUTPATIENT)
Dept: CARDIOLOGY | Facility: HOSPITAL | Age: 75
Discharge: HOME | End: 2020-06-29
Admitting: ORTHOPAEDIC SURGERY

## 2020-06-29 DIAGNOSIS — Z01.818 PRE-OP EXAM: ICD-10-CM

## 2020-06-29 DIAGNOSIS — Z01.811 PRE-OP CHEST EXAM: ICD-10-CM

## 2020-06-29 DIAGNOSIS — M25.559 HIP PAIN: Primary | ICD-10-CM

## 2020-06-29 DIAGNOSIS — Z01.811 PRE-OP CHEST EXAM: Primary | ICD-10-CM

## 2020-06-29 DIAGNOSIS — M25.559 HIP PAIN: ICD-10-CM

## 2020-06-29 LAB
ANION GAP SERPL CALCULATED.3IONS-SCNC: 12.1 MMOL/L (ref 5–15)
BUN SERPL-MCNC: 22 MG/DL (ref 8–23)
BUN/CREAT SERPL: 16.7 (ref 7–25)
CALCIUM SPEC-SCNC: 9.4 MG/DL (ref 8.6–10.5)
CHLORIDE SERPL-SCNC: 104 MMOL/L (ref 98–107)
CO2 SERPL-SCNC: 23.9 MMOL/L (ref 22–29)
CREAT SERPL-MCNC: 1.32 MG/DL (ref 0.76–1.27)
DEPRECATED RDW RBC AUTO: 44.9 FL (ref 37–54)
ERYTHROCYTE [DISTWIDTH] IN BLOOD BY AUTOMATED COUNT: 13.3 % (ref 12.3–15.4)
GFR SERPL CREATININE-BSD FRML MDRD: 53 ML/MIN/1.73
GLUCOSE SERPL-MCNC: 104 MG/DL (ref 65–99)
HCT VFR BLD AUTO: 50.6 % (ref 37.5–51)
HGB BLD-MCNC: 17.2 G/DL (ref 13–17.7)
MCH RBC QN AUTO: 31 PG (ref 26.6–33)
MCHC RBC AUTO-ENTMCNC: 34 G/DL (ref 31.5–35.7)
MCV RBC AUTO: 91.2 FL (ref 79–97)
PLATELET # BLD AUTO: 186 10*3/MM3 (ref 140–450)
PMV BLD AUTO: 12.7 FL (ref 6–12)
POTASSIUM SERPL-SCNC: 4.3 MMOL/L (ref 3.5–5.2)
RBC # BLD AUTO: 5.55 10*6/MM3 (ref 4.14–5.8)
SODIUM SERPL-SCNC: 140 MMOL/L (ref 136–145)
WBC NRBC COR # BLD: 9.68 10*3/MM3 (ref 3.4–10.8)

## 2020-06-29 PROCEDURE — 85027 COMPLETE CBC AUTOMATED: CPT

## 2020-06-29 PROCEDURE — 80048 BASIC METABOLIC PNL TOTAL CA: CPT

## 2020-06-29 PROCEDURE — 93005 ELECTROCARDIOGRAM TRACING: CPT

## 2020-06-29 PROCEDURE — 71046 X-RAY EXAM CHEST 2 VIEWS: CPT

## 2020-06-29 PROCEDURE — 73502 X-RAY EXAM HIP UNI 2-3 VIEWS: CPT

## 2020-06-29 PROCEDURE — 93010 ELECTROCARDIOGRAM REPORT: CPT | Performed by: INTERNAL MEDICINE

## 2020-06-29 PROCEDURE — 36415 COLL VENOUS BLD VENIPUNCTURE: CPT

## (undated) DEVICE — SOL ISO/ALC RUB 70PCT 4OZ

## (undated) DEVICE — BNDG ELAS CO-FLEX SLF ADHR 6IN 5YD LF STRL

## (undated) DEVICE — GLV SURG TRIUMPH CLASSIC PF LTX 8 STRL

## (undated) DEVICE — ANTIBACTERIAL UNDYED BRAIDED (POLYGLACTIN 910), SYNTHETIC ABSORBABLE SUTURE: Brand: COATED VICRYL

## (undated) DEVICE — Device

## (undated) DEVICE — PK HIP PINNING 40

## (undated) DEVICE — PAD,ABDOMINAL,8"X10",ST,LF: Brand: MEDLINE

## (undated) DEVICE — OCCLUSIVE GAUZE STRIP,3% BISMUTH TRIBROMOPHENATE IN PETROLATUM BLEND: Brand: XEROFORM

## (undated) DEVICE — LEGGINGS, PAIR, CLEAR, STERILE: Brand: MEDLINE

## (undated) DEVICE — SUT VIC 0 CT1 36IN J946H

## (undated) DEVICE — GUIDEPIN TEMP THRD 3.2X508MM DISP

## (undated) DEVICE — TRY SKINPREP DRYPREP

## (undated) DEVICE — APPL CHLORAPREP W/TINT 26ML ORNG

## (undated) DEVICE — ENCORE® LATEX ORTHO SIZE 8.5, STERILE LATEX POWDER-FREE SURGICAL GLOVE: Brand: ENCORE

## (undated) DEVICE — MAT FLR ABSORBENT LG 4FT 10 2.5FT

## (undated) DEVICE — SPNG GZ WOVN 4X4IN 12PLY 10/BX STRL

## (undated) DEVICE — BANDAGE,GAUZE,BULKEE II,4.5"X4.1YD,STRL: Brand: MEDLINE